# Patient Record
Sex: FEMALE | Race: WHITE | Employment: UNEMPLOYED | URBAN - METROPOLITAN AREA
[De-identification: names, ages, dates, MRNs, and addresses within clinical notes are randomized per-mention and may not be internally consistent; named-entity substitution may affect disease eponyms.]

---

## 2017-01-20 DIAGNOSIS — G43.919 INTRACTABLE MIGRAINE WITHOUT STATUS MIGRAINOSUS, UNSPECIFIED MIGRAINE TYPE: ICD-10-CM

## 2017-01-20 RX ORDER — TOPIRAMATE 25 MG/1
25 TABLET ORAL 2 TIMES DAILY WITH MEALS
Qty: 180 TAB | Refills: 1 | Status: SHIPPED | OUTPATIENT
Start: 2017-01-20 | End: 2018-11-28 | Stop reason: ALTCHOICE

## 2017-02-06 ENCOUNTER — OFFICE VISIT (OUTPATIENT)
Dept: INTERNAL MEDICINE CLINIC | Age: 32
End: 2017-02-06

## 2017-02-06 VITALS
HEART RATE: 103 BPM | SYSTOLIC BLOOD PRESSURE: 118 MMHG | WEIGHT: 251.3 LBS | RESPIRATION RATE: 16 BRPM | HEIGHT: 65 IN | BODY MASS INDEX: 41.87 KG/M2 | DIASTOLIC BLOOD PRESSURE: 78 MMHG | TEMPERATURE: 98.7 F | OXYGEN SATURATION: 98 %

## 2017-02-06 DIAGNOSIS — I47.9 TACHYCARDIA, PAROXYSMAL (HCC): ICD-10-CM

## 2017-02-06 DIAGNOSIS — G44.229 CHRONIC TENSION-TYPE HEADACHE, NOT INTRACTABLE: Primary | ICD-10-CM

## 2017-02-06 DIAGNOSIS — M65.9 TENOSYNOVITIS OF LEFT ANKLE: ICD-10-CM

## 2017-02-06 DIAGNOSIS — E66.01 MORBID OBESITY DUE TO EXCESS CALORIES (HCC): ICD-10-CM

## 2017-02-06 NOTE — MR AVS SNAPSHOT
Visit Information Date & Time Provider Department Dept. Phone Encounter #  
 2/6/2017  9:30 AM Vada Bumpers, [address] [facility],[address] Internal Medicine [phone] [id number] Upcoming Health Maintenance Date Due Pneumococcal 19-64 Medium Risk (1 of 1 - PPSV23) 11/1/2004 DTaP/Tdap/Td series (1 - Tdap) 11/1/2006 PAP AKA CERVICAL CYTOLOGY 11/1/2006 INFLUENZA AGE 9 TO ADULT 8/1/2016 Allergies as of 2/6/2017  Review Complete On: 2/6/2017 By: Richie Hogan LPN Severity Noted Reaction Type Reactions Tomato Medium 07/17/2013   Systemic Itching Amoxicillin  12/10/2012    Hives Grass Pollen-bermuda, Standard  03/11/2011    Swelling Malt Extract  09/10/2012    Unknown (comments) Oak  09/10/2012    Unknown (comments) Pcn [Penicillins]  02/21/2011    Unknown (comments) Ragweed  09/10/2012    Unknown (comments) Shellfish Containing Products  09/10/2012    Unknown (comments) Yeast, Dried  09/10/2012    Unknown (comments) Current Immunizations  Never Reviewed No immunizations on file. Not reviewed this visit You Were Diagnosed With   
  
 Codes Comments Chronic tension-type headache, not intractable    -  Primary ICD-10-CM: R34.943 ICD-9-CM: 339.12 Morbid obesity due to excess calories (HCC)     ICD-10-CM: E66.01 
ICD-9-CM: 278.01 Tenosynovitis of left ankle     ICD-10-CM: M65.9 ICD-9-CM: 727.06 Tachycardia, paroxysmal (HCC)     ICD-10-CM: I47.9 ICD-9-CM: 427.2 Vitals BP Pulse Temp Resp Height(growth percentile) Weight(growth percentile) 118/78 (BP 1 Location: Left arm, BP Patient Position: Sitting) (!) 103 98.7 °F (37.1 °C) (Oral) 16 5' 5\" (1.651 m) 251 lb 4.8 oz (114 kg) LMP SpO2 BMI OB Status Smoking Status 02/01/2017 98% 41.82 kg/m2 Having regular periods Former Smoker BMI and BSA Data Body Mass Index Body Surface Area  
 41.82 kg/m 2 2.29 m 2 Preferred Pharmacy Pharmacy Name Phone [id number] [place], [address] [phone] Your Updated Medication List  
  
   
This list is accurate as of: 2/6/17  9:55 AM.  Always use your most recent med list.  
  
  
  
  
 albuterol 90 mcg/actuation inhaler Commonly known as:  PROAIR HFA Take 1 Puff by inhalation every four (4) hours as needed for Wheezing. ALLEGRA 180 mg tablet Generic drug:  fexofenadine Take 180 mg by mouth daily. diclofenac EC 50 mg EC tablet Commonly known as:  VOLTAREN Take  by mouth two (2) times a day. FLONASE 50 mcg/actuation nasal spray Generic drug:  fluticasone 2 Sprays by Both Nostrils route daily. metFORMIN 500 mg tablet Commonly known as:  GLUCOPHAGE Take 500 mg by mouth two (2) times daily (with meals). Two pills in am and two pill in pm.  titrating to goal dose. topiramate 25 mg tablet Commonly known as:  TOPAMAX Take 1 Tab by mouth two (2) times daily (with meals). traMADol 50 mg tablet Commonly known as:  ULTRAM  
Take 50 mg by mouth every six (6) hours as needed for Pain. Introducing [facility] & HEALTH SERVICES! Dear Mckay Diehl: 
Thank you for requesting a [employer] account. Our records indicate that you already have an active [employer] account. You can access your account anytime at [email or web address] Did you know that you can access your hospital and ER discharge instructions at any time in [employer]? You can also review all of your test results from your hospital stay or ER visit. Additional Information If you have questions, please visit the Frequently Asked Questions section of the [employer] website at [email or web address] Remember, [employer] is NOT to be used for urgent needs. For medical emergencies, dial 911. Now available from your iPhone and Android! Please provide this summary of care documentation to your next provider. Your primary care clinician is listed as Margarita Gama. If you have any questions after today's visit, please call [phone].

## 2017-02-06 NOTE — PROGRESS NOTES
Written by Celi Beltrán, as dictated by Dr. Esther Carrizales MD.    Brayan Pop is a 32 y.o. female. HPI  The patient comes in today for a follow up. She weighs 251 lbs today and she has lost 9 lbs since the end of December. She had a biometric screening at [facility]. She is taking 1/2 tablet of phentermine at this time, and she is following with Dr. Luke Almeida for that. She is taking a full pill the week before her menstrual cycle. She follows with him next month. Her L ankle is slowly improving, and she is doing PT at this time. She had dry needling at [facility]. She should get her boot off in 2 weeks, but she was told that it will take 4-6 months to heal completely. She was cleared to swim at this time. She was given Tramadol for the pain as needed. She is compliant on Topamax, and it has significantly decreased her headaches. She still has headaches sometimes. She will get bad headaches with weather changes. She is compliant with B12 and vitamin D. Her HR is elevated today, but she did not sleep well. Patient Active Problem List   Diagnosis Code    Obesity E66.9    Anxiety disorder F41.9    Allergic asthma J45.909    Chronic tension-type headache, not intractable G44.229        Current Outpatient Prescriptions on File Prior to Visit   Medication Sig Dispense Refill    topiramate (TOPAMAX) 25 mg tablet Take 1 Tab by mouth two (2) times daily (with meals). 180 Tab 1    metFORMIN (GLUCOPHAGE) 500 mg tablet Take 500 mg by mouth two (2) times daily (with meals). Two pills in am and two pill in pm.  titrating to goal dose.  fluticasone (FLONASE) 50 mcg/actuation nasal spray 2 Sprays by Both Nostrils route daily.  fexofenadine (ALLEGRA) 180 mg tablet Take 180 mg by mouth daily.  ibuprofen (MOTRIN) 600 mg tablet Take 1 Tab by mouth every six (6) hours as needed for Pain.  20 Tab 0    albuterol (PROAIR HFA) 90 mcg/actuation inhaler Take 1 Puff by inhalation every four (4) hours as needed for Wheezing. 1 Inhaler 3    traMADol (ULTRAM) 50 mg tablet Take 50 mg by mouth every six (6) hours as needed for Pain.  diclofenac EC (VOLTAREN) 50 mg EC tablet Take  by mouth two (2) times a day. No current facility-administered medications on file prior to visit. Allergies   Allergen Reactions    Tomato Itching    Amoxicillin Hives    Grass Pollen-Bermuda, Standard Swelling    Malt Extract Unknown (comments)    Oak Unknown (comments)    Pcn [Penicillins] Unknown (comments)    Ragweed Unknown (comments)    Shellfish Containing Products Unknown (comments)    Yeast, Dried Unknown (comments)       Past Medical History   Diagnosis Date    Asthma     Depression     Family history of skin cancer      maternal grandfather - melanoma    Headache(784.0)     PCOS (polycystic ovarian syndrome)     Sun-damaged skin     Tanning bed exposure        Past Surgical History   Procedure Laterality Date    Hx wisdom teeth extraction      Hx tonsil and adenoidectomy      Hx tonsillectomy         Family History   Problem Relation Age of Onset    Psychiatric Disorder Father     Cancer Maternal Aunt     Heart Disease Maternal Grandfather     Hypertension Maternal Grandfather     Cancer Paternal Grandmother     Diabetes Paternal Grandmother     Hypertension Paternal Grandmother        Social History     Social History    Marital status: SINGLE     Spouse name: N/A    Number of children: N/A    Years of education: N/A     Occupational History    Not on file.      Social History Main Topics    Smoking status: Former Smoker    Smokeless tobacco: Never Used    Alcohol use 1.0 oz/week     2 Glasses of wine per week    Drug use: No    Sexual activity: Yes     Partners: Male     Other Topics Concern    Not on file     Social History Narrative    ** Merged History Encounter **            Office Visit on 11/21/2016 Component Date Value Ref Range Status    Vitamin B12 11/21/2016 374  211 - 946 pg/mL Final    Folate 11/21/2016 8.7  >3.0 ng/mL Final    Comment: A serum folate concentration of less than 3.1 ng/mL is  considered to represent clinical deficiency. Review of Systems   Constitutional: Negative for malaise/fatigue. HENT: Negative for congestion. Respiratory: Negative for cough and wheezing. Cardiovascular: Negative for chest pain and palpitations. Musculoskeletal: Negative for joint pain and myalgias. Neurological: Positive for headaches. Negative for weakness. Psychiatric/Behavioral: Negative for depression and suicidal ideas. The patient is not nervous/anxious. Visit Vitals    /78 (BP 1 Location: Left arm, BP Patient Position: Sitting)    Pulse (!) 103    Temp 98.7 °F (37.1 °C) (Oral)    Resp 16    Ht 5' 5\" (1.651 m)    Wt 251 lb 4.8 oz (114 kg)  Comment: received from [facility] [employer].  [facility] 02/01/2017    SpO2 98%    BMI 41.82 kg/m2     Physical Exam   Constitutional: She is oriented to person, place, and time. No distress. Obese   HENT:   Right Ear: External ear normal.   Left Ear: External ear normal.   Mouth/Throat: Oropharynx is clear and moist.   Eyes: Conjunctivae and EOM are normal. Right eye exhibits no discharge. Left eye exhibits no discharge. Neck: Normal range of motion. Neck supple. Cardiovascular: Regular rhythm. Tachycardia present. Pulmonary/Chest: Effort normal and breath sounds normal. She has no wheezes. Abdominal: Soft. Bowel sounds are normal. She exhibits no distension. Lymphadenopathy:     She has no cervical adenopathy. Neurological: She is alert and oriented to person, place, and time. Skin: Skin is intact. Psychiatric: She has a normal mood and affect. Nursing note and vitals reviewed. ASSESSMENT and PLAN    ICD-10-CM ICD-9-CM    1.  Chronic tension-type headache, not intractable G44.229 339.12 Doing well on current dose. 2. Morbid obesity due to excess calories (HCC) E66.01 278.01 She is following with Dr. Joanne Allred at [facility] and she has lost 28 lbs total.    3. Tenosynovitis of left ankle M65.9 727.06 She follows with ortho. 4. Tachycardia, paroxysmal (HCC) I47.9 427.2 I discussed that she should be careful with her HR since phentermine can cause palpitations. I discussed that she could have a hypoglycemic episode with the metformin now that she has lost weight, so if she feels dizzy or light headed she should check her BS. This plan was reviewed with the patient and patient agrees. All questions were answered. This scribe documentation was reviewed by me and accurately reflects the examination and decisions made by me. This note will not be viewable in [address]

## 2017-02-06 NOTE — PROGRESS NOTES
Chief Complaint   Patient presents with    Follow-up     follow up on weight loss and general wellness. headaches.

## 2017-02-21 ENCOUNTER — OFFICE VISIT (OUTPATIENT)
Dept: FAMILY MEDICINE CLINIC | Age: 32
End: 2017-02-21

## 2017-02-21 VITALS
HEART RATE: 119 BPM | TEMPERATURE: 98.1 F | RESPIRATION RATE: 18 BRPM | DIASTOLIC BLOOD PRESSURE: 94 MMHG | WEIGHT: 251 LBS | SYSTOLIC BLOOD PRESSURE: 124 MMHG | HEIGHT: 65 IN | BODY MASS INDEX: 41.82 KG/M2 | OXYGEN SATURATION: 98 %

## 2017-02-21 DIAGNOSIS — J30.1 NON-SEASONAL ALLERGIC RHINITIS DUE TO POLLEN: Primary | ICD-10-CM

## 2017-02-21 DIAGNOSIS — J02.9 SORE THROAT: ICD-10-CM

## 2017-02-21 LAB
S PYO AG THROAT QL: NEGATIVE
VALID INTERNAL CONTROL?: YES

## 2017-02-21 RX ORDER — PHENTERMINE HYDROCHLORIDE 15 MG/1
15 CAPSULE ORAL
COMMUNITY
End: 2018-09-10 | Stop reason: ALTCHOICE

## 2017-02-21 NOTE — MR AVS SNAPSHOT
Visit Information Date & Time Provider Department Dept. Phone Encounter #  
 2/21/2017  5:30 PM Polina Burdick, [address] [phone] [id number] Follow-up Instructions Return if symptoms worsen or fail to improve. Upcoming Health Maintenance Date Due Pneumococcal 19-64 Medium Risk (1 of 1 - PPSV23) 11/1/2004 DTaP/Tdap/Td series (1 - Tdap) 11/1/2006 PAP AKA CERVICAL CYTOLOGY 11/1/2006 Allergies as of 2/21/2017  Review Complete On: 2/21/2017 By: Domenica Maher LPN Severity Noted Reaction Type Reactions Tomato Medium 07/17/2013   Systemic Itching Amoxicillin  12/10/2012    Hives Grass Pollen-bermuda, Standard  03/11/2011    Swelling Malt Extract  09/10/2012    Unknown (comments) Oak  09/10/2012    Unknown (comments) Pcn [Penicillins]  02/21/2011    Unknown (comments) Ragweed  09/10/2012    Unknown (comments) Shellfish Containing Products  09/10/2012    Unknown (comments) Yeast, Dried  09/10/2012    Unknown (comments) Current Immunizations  Never Reviewed No immunizations on file. Not reviewed this visit You Were Diagnosed With   
  
 Codes Comments Non-seasonal allergic rhinitis due to pollen    -  Primary ICD-10-CM: J30.1 ICD-9-CM: 477.0 Sore throat     ICD-10-CM: J02.9 ICD-9-CM: 509 Vitals BP Pulse Temp Resp Height(growth percentile) Weight(growth percentile) (!) 124/94 (!) 119 98.1 °F (36.7 °C) (Oral) 18 5' 5\" (1.651 m) 251 lb (113.9 kg) LMP SpO2 BMI OB Status Smoking Status 02/01/2017 98% 41.77 kg/m2 Having regular periods Former Smoker Vitals History BMI and BSA Data Body Mass Index Body Surface Area 41.77 kg/m 2 2.29 m 2 Preferred Pharmacy Pharmacy Name Phone [id number] [place] [address] [phone] Your Updated Medication List  
  
   
 This list is accurate as of: 2/21/17  6:28 PM.  Always use your most recent med list.  
  
  
  
  
 albuterol 90 mcg/actuation inhaler Commonly known as:  PROAIR HFA Take 1 Puff by inhalation every four (4) hours as needed for Wheezing. ALLEGRA 180 mg tablet Generic drug:  fexofenadine Take 180 mg by mouth daily. FLONASE 50 mcg/actuation nasal spray Generic drug:  fluticasone 2 Sprays by Both Nostrils route daily. metFORMIN 500 mg tablet Commonly known as:  GLUCOPHAGE Take 500 mg by mouth two (2) times daily (with meals). Two pills in am and two pill in pm.  titrating to goal dose. phentermine 15 mg capsule Commonly known as:  ADIPEX_P Take 15 mg by mouth every morning. topiramate 25 mg tablet Commonly known as:  TOPAMAX Take 1 Tab by mouth two (2) times daily (with meals). We Performed the Following AMB POC RAPID STREP A [52903 CPT(R)] Follow-up Instructions Return if symptoms worsen or fail to improve. Patient Instructions Seasonal Allergies: Care Instructions Your Care Instructions Allergies occur when your body's defense system (immune system) overreacts to certain substances. The immune system treats a harmless substance as if it were a harmful germ or virus. Many things can cause this to happen. Examples include pollens, medicine, food, dust, animal dander, and mold. Your allergies are seasonal if you have symptoms just at certain times of the year. In that case, you are probably allergic to pollens from certain trees, grasses, or weeds. Allergies can be mild or severe. Over-the-counter allergy medicine may help with some symptoms. Read and follow all instructions on the label. Managing your allergies is an important part of staying healthy. Your doctor may suggest that you have tests to help find the cause of your allergies.  When you know what things trigger your symptoms, you can avoid them. This can prevent allergy symptoms and other health problems. In some cases, immunotherapy might help. For this treatment, you get shots or use pills that have a small amount of certain allergens in them. Your body \"gets used to\" the allergen, so you react less to it over time. This kind of treatment may help prevent or reduce some allergy symptoms. Follow-up care is a key part of your treatment and safety. Be sure to make and go to all appointments, and call your doctor if you are having problems. It's also a good idea to know your test results and keep a list of the medicines you take. How can you care for yourself at home? · Be safe with medicines. Take your medicines exactly as prescribed. Call your doctor if you think you are having a problem with your medicine. · During your allergy season, keep windows closed. If you need to use air-conditioning, change or clean all filters every month. Take a shower and change your clothes after you have been outside. · Stay inside when pollen counts are high. Vacuum once or twice a week. Use a vacuum  with a HEPA filter or a double-thickness filter. When should you call for help? Call 911 anytime you think you may need emergency care. For example, call if: 
· You have symptoms of a severe allergic reaction. These may include: 
¨ Sudden raised, red areas (hives) all over your body. ¨ Swelling of the throat, mouth, lips, or tongue. ¨ Trouble breathing. ¨ Passing out (losing consciousness). Or you may feel very lightheaded or suddenly feel weak, confused, or restless. Watch closely for changes in your health, and be sure to contact your doctor if: 
· You need help controlling your allergies. · You have questions about allergy testing. · You do not get better as expected. Where can you learn more? Go to http://marcelle-yvonne.info/. Enter J912 in the search box to learn more about \"Seasonal Allergies: Care Instructions. \" 
 Current as of: February 12, 2016 Content Version: 11.1 © 3048-5318 [employer]. Care instructions adapted under license by [employer] (which disclaims liability or warranty for this information). If you have questions about a medical condition or this instruction, always ask your healthcare professional. [address] any warranty or liability for your use of this information. Introducing \[facility]\"" & HEALTH SERVICES! Dear Jose A Gallardo: 
Thank you for requesting a [email or web address] account. Our records indicate that you already have an active [email or web address] account. You can access your account anytime at [email or web address] Did you know that you can access your hospital and ER discharge instructions at any time in [email or web address]? You can also review all of your test results from your hospital stay or ER visit. Additional Information If you have questions, please visit the Frequently Asked Questions section of the [email or web address] website at [email or web address] Remember, [email or web address] is NOT to be used for urgent needs. For medical emergencies, dial 911. Now available from your iPhone and Android! Please provide this summary of care documentation to your next provider. Your primary care clinician is listed as Miryam De Santiago. If you have any questions after today's visit, please call [phone].

## 2017-02-21 NOTE — PROGRESS NOTES
Subjective:      Patient : This patient is a 32 y.o. female that comes in with a chief complaint of allerigic rhinitis. The patient has responded to antihistamines and has responded to intranasal coriticosteroids. Other remedies tried:tried OTCs with relief of symptoms. She did get exposed to Strep, and has a mild sore throat for 2 days. No fever or congestion noted. Objective:     ROS:   Feeling well. No dyspnea or chest pain on exertion. No abdominal pain, change in bowel habits, black or bloody stools. No urinary tract symptoms. No neurological complaints. Mild sore throat x 2 days. OBJECTIVE:   The patient appears well, alert, oriented x 3, in no distress. Visit Vitals    BP (!) 124/94  Comment: wgt loss meds make bp high    Pulse (!) 119  Comment: weight loss meds make pulse high    Temp 98.1 °F (36.7 °C) (Oral)    Resp 18    Ht 5' 5\" (1.651 m)    Wt 251 lb (113.9 kg)    LMP 02/01/2017    SpO2 98%    BMI 41.77 kg/m2     HEENT:ENT clear, no erythema or exudate. Neck supple. No adenopathy or thyromegaly. AARTI. Chest: Lungs are clear, good air entry, no wheezes, rhonchi or rales. Cardiovascular: S1 and S2 normal, no murmurs, regular rate and rhythm. Abdomen: soft without tenderness, guarding, mass or organomegaly. Extremities: show no edema, normal peripheral pulses. Neurological: is normal, no focal findings. Assessment/Plan:   Allergy management discussed. ICD-10-CM ICD-9-CM    1. Non-seasonal allergic rhinitis due to pollen J30.1 477.0    2.  Sore throat J02.9 462 AMB POC RAPID STREP A

## 2017-02-21 NOTE — PATIENT INSTRUCTIONS
Seasonal Allergies: Care Instructions  Your Care Instructions  Allergies occur when your body's defense system (immune system) overreacts to certain substances. The immune system treats a harmless substance as if it were a harmful germ or virus. Many things can cause this to happen. Examples include pollens, medicine, food, dust, animal dander, and mold. Your allergies are seasonal if you have symptoms just at certain times of the year. In that case, you are probably allergic to pollens from certain trees, grasses, or weeds. Allergies can be mild or severe. Over-the-counter allergy medicine may help with some symptoms. Read and follow all instructions on the label. Managing your allergies is an important part of staying healthy. Your doctor may suggest that you have tests to help find the cause of your allergies. When you know what things trigger your symptoms, you can avoid them. This can prevent allergy symptoms and other health problems. In some cases, immunotherapy might help. For this treatment, you get shots or use pills that have a small amount of certain allergens in them. Your body \"gets used to\" the allergen, so you react less to it over time. This kind of treatment may help prevent or reduce some allergy symptoms. Follow-up care is a key part of your treatment and safety. Be sure to make and go to all appointments, and call your doctor if you are having problems. It's also a good idea to know your test results and keep a list of the medicines you take. How can you care for yourself at home? · Be safe with medicines. Take your medicines exactly as prescribed. Call your doctor if you think you are having a problem with your medicine. · During your allergy season, keep windows closed. If you need to use air-conditioning, change or clean all filters every month. Take a shower and change your clothes after you have been outside. · Stay inside when pollen counts are high.  Vacuum once or twice a week. Use a vacuum  with a HEPA filter or a double-thickness filter. When should you call for help? Call 911 anytime you think you may need emergency care. For example, call if:  · You have symptoms of a severe allergic reaction. These may include:  ¨ Sudden raised, red areas (hives) all over your body. ¨ Swelling of the throat, mouth, lips, or tongue. ¨ Trouble breathing. ¨ Passing out (losing consciousness). Or you may feel very lightheaded or suddenly feel weak, confused, or restless. Watch closely for changes in your health, and be sure to contact your doctor if:  · You need help controlling your allergies. · You have questions about allergy testing. · You do not get better as expected. Where can you learn more? Go to http://marcelle-yvonne.info/. Enter J912 in the search box to learn more about \"Seasonal Allergies: Care Instructions. \"  Current as of: February 12, 2016  Content Version: 11.1  © 20063829-2109 [employer]. Care instructions adapted under license by [employer] (which disclaims liability or warranty for this information). If you have questions about a medical condition or this instruction, always ask your healthcare professional. [address] any warranty or liability for your use of this information.

## 2017-03-28 ENCOUNTER — OFFICE VISIT (OUTPATIENT)
Dept: INTERNAL MEDICINE CLINIC | Age: 32
End: 2017-03-28

## 2017-03-28 VITALS
DIASTOLIC BLOOD PRESSURE: 88 MMHG | OXYGEN SATURATION: 99 % | BODY MASS INDEX: 42.61 KG/M2 | SYSTOLIC BLOOD PRESSURE: 134 MMHG | HEART RATE: 103 BPM | TEMPERATURE: 98.9 F | HEIGHT: 65 IN | RESPIRATION RATE: 16 BRPM | WEIGHT: 255.73 LBS

## 2017-03-28 DIAGNOSIS — E66.01 MORBID OBESITY, UNSPECIFIED OBESITY TYPE (HCC): ICD-10-CM

## 2017-03-28 DIAGNOSIS — F41.8 DEPRESSION WITH ANXIETY: Primary | ICD-10-CM

## 2017-03-28 RX ORDER — BUPROPION HYDROCHLORIDE 75 MG/1
75 TABLET ORAL 2 TIMES DAILY
Qty: 60 TAB | Refills: 0 | Status: SHIPPED | OUTPATIENT
Start: 2017-03-28 | End: 2017-04-27

## 2017-03-28 NOTE — PROGRESS NOTES
Chief Complaint   Patient presents with    Other     patient was in an air cast with leg and started to feel depressed. states it is getting worse.  now she knows that she has depression. has hx and has not seen mental health in over two years.   needs a referral.

## 2017-03-28 NOTE — PROGRESS NOTES
Written by Sridevi Chaudhry, as dictated by Dr. Carter Milligan MD.    Heaven Walden is a 32 y.o. female. HPI  The patient comes in today C/O depression. She has been feeling depressed and having crying spells since  January and she attributed it to the air cast she had on her foot because she could not exercise or go out. But now she does not have air cast & symptoms are getting worse. She is concerned she will gain all her weight back if she doesn`t get a treatment for her depression right a way. The depression is making it harder to loose weight. She has gained 4 lbs since she was last seen in January. She has had  tried Wellbutrin and Celexa in the past. She would like to follow with a psychiatrist.     She is still following with Dr. Louie Swenson for her weight loss again in 6 weeks. She is still on phentermine at this point, but he is discussing changing her to another medication. Patient Active Problem List   Diagnosis Code    Obesity E66.9    Anxiety disorder F41.9    Allergic asthma J45.909    Chronic tension-type headache, not intractable G44.229        Current Outpatient Prescriptions on File Prior to Visit   Medication Sig Dispense Refill    phentermine (ADIPEX_P) 15 mg capsule Take 15 mg by mouth every morning.  topiramate (TOPAMAX) 25 mg tablet Take 1 Tab by mouth two (2) times daily (with meals). 180 Tab 1    metFORMIN (GLUCOPHAGE) 500 mg tablet Take 500 mg by mouth two (2) times daily (with meals). Two pills in am and two pill in pm.  titrating to goal dose.  fluticasone (FLONASE) 50 mcg/actuation nasal spray 2 Sprays by Both Nostrils route daily.  fexofenadine (ALLEGRA) 180 mg tablet Take 180 mg by mouth daily.  albuterol (PROAIR HFA) 90 mcg/actuation inhaler Take 1 Puff by inhalation every four (4) hours as needed for Wheezing.  (Patient taking differently: Take 2 Puffs by inhalation every four (4) hours as needed for Wheezing.) 1 Inhaler 3     No current facility-administered medications on file prior to visit. Allergies   Allergen Reactions    Tomato Itching    Amoxicillin Hives    Grass Pollen-Bermuda, Standard Swelling    Malt Extract Unknown (comments)    Oak Unknown (comments)    Pcn [Penicillins] Unknown (comments)    Ragweed Unknown (comments)    Shellfish Containing Products Unknown (comments)    Yeast, Dried Unknown (comments)       Past Medical History:   Diagnosis Date    Asthma     Depression     Family history of skin cancer     maternal grandfather - melanoma    Headache(784.0)     PCOS (polycystic ovarian syndrome)     Sun-damaged skin     Tanning bed exposure        Past Surgical History:   Procedure Laterality Date    HX TONSIL AND ADENOIDECTOMY      HX TONSILLECTOMY      HX WISDOM TEETH EXTRACTION         Family History   Problem Relation Age of Onset    Psychiatric Disorder Father     Cancer Maternal Aunt     Heart Disease Maternal Grandfather     Hypertension Maternal Grandfather     Cancer Paternal Grandmother     Diabetes Paternal Grandmother     Hypertension Paternal Grandmother        Social History     Social History    Marital status: SINGLE     Spouse name: N/A    Number of children: N/A    Years of education: N/A     Occupational History    Not on file. Social History Main Topics    Smoking status: Former Smoker    Smokeless tobacco: Never Used    Alcohol use 1.0 oz/week     2 Glasses of wine per week    Drug use: No    Sexual activity: Yes     Partners: Male     Other Topics Concern    Not on file     Social History Narrative    ** Merged History Encounter **            Office Visit on 02/21/2017   Component Date Value Ref Range Status    VALID INTERNAL CONTROL POC 02/21/2017 Yes   Final    Group A Strep Ag 02/21/2017 Negative  Negative Final       Review of Systems   Constitutional: Negative for malaise/fatigue. HENT: Negative for congestion.     Respiratory: Negative for cough and wheezing. Cardiovascular: Negative for chest pain and palpitations. Neurological: Negative for weakness and headaches. Psychiatric/Behavioral: Positive for depression. Negative for suicidal ideas. The patient is not nervous/anxious. Visit Vitals    /88 (BP 1 Location: Right arm, BP Patient Position: Sitting)    Pulse (!) 103    Temp 98.9 °F (37.2 °C) (Oral)    Resp 16    Ht 5' 5\" (1.651 m)    Wt 255 lb 11.7 oz (116 kg)    LMP 02/26/2017    SpO2 99%    BMI 42.56 kg/m2     Physical Exam   Constitutional: She is oriented to person, place, and time. She appears well-nourished. No distress. HENT:   Right Ear: External ear normal.   Left Ear: External ear normal.   Mouth/Throat: Oropharynx is clear and moist.   Eyes: Conjunctivae and EOM are normal. Right eye exhibits no discharge. Left eye exhibits no discharge. Neck: Normal range of motion. Neck supple. Cardiovascular: Normal rate and regular rhythm. Pulmonary/Chest: Effort normal and breath sounds normal. She has no wheezes. Abdominal: Soft. Bowel sounds are normal. She exhibits no distension. Lymphadenopathy:     She has no cervical adenopathy. Neurological: She is alert and oriented to person, place, and time. Skin: Skin is intact. Psychiatric: She has a normal mood and affect. Nursing note and vitals reviewed. ASSESSMENT and PLAN    ICD-10-CM ICD-9-CM    1. Depression with anxiety F41.8 300.4 buPROPion (WELLBUTRIN) 75 mg tablet sent to pharmacy. REFERRAL TO PSYCHIATRY      I will start her on Wellbutrin to help with her depression. I want her to start Wellbutrin 75 once a day for 4-5 days and then she can increase to BID. I will place a referral for psychiatry and I discussed that it may take her a while to get an appointment. I will give her the number for a [place] [id number] [facility] therapy to try in the meanwhile.     2. Morbid obesity, unspecified obesity type ([facility] E66.01 278.01 She follows with Dr. Robin Her for weight loss and she is currently on phentermine. This plan was reviewed with the patient and patient agrees. All questions were answered. This scribe documentation was reviewed by me and accurately reflects the examination and decisions made by me. This note will not be viewable in [address]

## 2017-03-28 NOTE — MR AVS SNAPSHOT
Visit Information Date & Time Provider Department Dept. Phone Encounter #  
 3/28/2017  3:45 PM Karen Dawson MD [facility] Internal Medicine [phone] [id number] Upcoming Health Maintenance Date Due Pneumococcal 19-64 Medium Risk (1 of 1 - PPSV23) 11/1/2004 DTaP/Tdap/Td series (1 - Tdap) 11/1/2006 PAP AKA CERVICAL CYTOLOGY 11/1/2006 Allergies as of 3/28/2017  Review Complete On: 3/28/2017 By: Silvia Lemon LPN Severity Noted Reaction Type Reactions Tomato Medium 07/17/2013   Systemic Itching Amoxicillin  12/10/2012    Hives Grass Pollen-bermuda, Standard  03/11/2011    Swelling Malt Extract  09/10/2012    Unknown (comments) Oak  09/10/2012    Unknown (comments) Pcn [Penicillins]  02/21/2011    Unknown (comments) Ragweed  09/10/2012    Unknown (comments) Shellfish Containing Products  09/10/2012    Unknown (comments) Yeast, Dried  09/10/2012    Unknown (comments) Current Immunizations  Never Reviewed No immunizations on file. Not reviewed this visit You Were Diagnosed With   
  
 Codes Comments Depression with anxiety    -  Primary ICD-10-CM: F41.8 ICD-9-CM: 300.4 Morbid obesity, unspecified obesity type ([facility]     ICD-10-CM: E66.01 
ICD-9-CM: 278.01 Vitals BP Pulse Temp Resp Height(growth percentile) Weight(growth percentile) 134/88 (BP 1 Location: Right arm, BP Patient Position: Sitting) (!) 103 98.9 °F (37.2 °C) (Oral) 16 5' 5\" (1.651 m) 255 lb 11.7 oz (116 kg) LMP SpO2 BMI OB Status Smoking Status 02/26/2017 99% 42.56 kg/m2 Having regular periods Former Smoker BMI and BSA Data Body Mass Index Body Surface Area 42.56 kg/m 2 2.31 m 2 Preferred Pharmacy Pharmacy Name Phone [id number] [address] [facility] [phone] Your Updated Medication List  
  
   
This list is accurate as of: 3/28/17  4:01 PM.  Always use your most recent med list.  
  
  
  
  
 albuterol 90 mcg/actuation inhaler Commonly known as:  PROAIR HFA Take 1 Puff by inhalation every four (4) hours as needed for Wheezing. ALLEGRA 180 mg tablet Generic drug:  fexofenadine Take 180 mg by mouth daily. buPROPion 75 mg tablet Commonly known as:  [facility] Take 1 Tab by mouth two (2) times a day for 30 days. FLONASE 50 mcg/actuation nasal spray Generic drug:  fluticasone 2 Sprays by Both Nostrils route daily. metFORMIN 500 mg tablet Commonly known as:  GLUCOPHAGE Take 500 mg by mouth two (2) times daily (with meals). Two pills in am and two pill in pm.  titrating to goal dose. phentermine 15 mg capsule Commonly known as:  ADIPEX_P Take 15 mg by mouth every morning. topiramate 25 mg tablet Commonly known as:  TOPAMAX Take 1 Tab by mouth two (2) times daily (with meals). Prescriptions Sent to Pharmacy Refills buPROPion (WELLBUTRIN) 75 mg tablet 0 Sig: Take 1 Tab by mouth two (2) times a day for 30 days. Class: Normal  
 Pharmacy: [address] [place] [address].  #: [phone] Route: Oral  
  
We Performed the Following REFERRAL TO PSYCHIATRY [REF91 Custom] Comments:  
 Please evaluate patient for depression with anxiety. Referral Information Referral ID Referred By Referred To  
  
 [id number] Lyndsey VIEYRA 2   
   [facility] [address], [address] Phone: [phone] Fax: [phone] Visits Status Start Date End Date 1 New Request 3/28/17 3/28/18 If your referral has a status of pending review or denied, additional information will be sent to support the outcome of this decision. Introducing [facility]! Dear Richi Hamlin: 
Thank you for requesting a [employer] account. Our records indicate that you already have an active [employer] account.   You can access your account anytime at [email or web address] Did you know that you can access your hospital and ER discharge instructions at any time in tadoÂ°? You can also review all of your test results from your hospital stay or ER visit. Additional Information If you have questions, please visit the Frequently Asked Questions section of the tadoÂ° website at [email or web address] Remember, tadoÂ° is NOT to be used for urgent needs. For medical emergencies, dial 911. Now available from your iPhone and Android! Please provide this summary of care documentation to your next provider. Your primary care clinician is listed as Brandy Reddy. If you have any questions after today's visit, please call [phone].

## 2017-09-21 ENCOUNTER — OFFICE VISIT (OUTPATIENT)
Dept: FAMILY MEDICINE CLINIC | Age: 32
End: 2017-09-21

## 2017-09-21 VITALS
DIASTOLIC BLOOD PRESSURE: 88 MMHG | RESPIRATION RATE: 16 BRPM | WEIGHT: 253.6 LBS | SYSTOLIC BLOOD PRESSURE: 118 MMHG | OXYGEN SATURATION: 96 % | TEMPERATURE: 96.8 F | BODY MASS INDEX: 42.25 KG/M2 | HEIGHT: 65 IN | HEART RATE: 94 BPM

## 2017-09-21 DIAGNOSIS — H65.01 RIGHT ACUTE SEROUS OTITIS MEDIA, RECURRENCE NOT SPECIFIED: Primary | ICD-10-CM

## 2017-09-21 DIAGNOSIS — J30.89 ENVIRONMENTAL AND SEASONAL ALLERGIES: ICD-10-CM

## 2017-09-21 DIAGNOSIS — H69.83 EUSTACHIAN TUBE DYSFUNCTION, BILATERAL: ICD-10-CM

## 2017-09-21 RX ORDER — LIRAGLUTIDE 6 MG/ML
INJECTION, SOLUTION SUBCUTANEOUS
Refills: 5 | COMMUNITY
Start: 2017-08-30 | End: 2018-08-28 | Stop reason: ALTCHOICE

## 2017-09-21 RX ORDER — BUPROPION HYDROCHLORIDE 300 MG/1
TABLET ORAL
Refills: 2 | COMMUNITY
Start: 2017-08-26 | End: 2020-12-01 | Stop reason: DRUGHIGH

## 2017-09-21 RX ORDER — BUPROPION HYDROCHLORIDE 150 MG/1
TABLET ORAL
Refills: 2 | COMMUNITY
Start: 2017-07-27 | End: 2018-08-28 | Stop reason: ALTCHOICE

## 2017-09-21 RX ORDER — DOXYCYCLINE 100 MG/1
100 CAPSULE ORAL 2 TIMES DAILY
Qty: 20 CAP | Refills: 0 | Status: SHIPPED | OUTPATIENT
Start: 2017-09-21 | End: 2017-10-01

## 2017-09-21 NOTE — PROGRESS NOTES
Subjective:   Jean Carlos Martin is a 32 y.o. female who complains of right ear pain for few days, gradually worsening since that time. Symptoms first started over a week ago with right ear and muffled hearing. The dull ache to the right ear started 3-4 days ago and has persisted. She reports R ear feeling clogged and occasionally can pop it. Now the left ear is starting to have similar symptoms. She also reports nasal congestion, sore throat, and postnasal drainage which she attributes to allergies. Denies any cough. Denies any fevers but has been feeling hot. She reports hx of seasonal allergies, currently taking Allegra, Sudafed, and Flonase for her symptoms. She denies a history of shortness of breath and wheezing. Evaluation to date: none. Treatment to date: decongestants, antihistamines, nasal steroids. Patient does not smoke cigarettes. Relevant PMH:   Past Medical History:   Diagnosis Date    Asthma     Depression     Family history of skin cancer     maternal grandfather - melanoma    Headache     PCOS (polycystic ovarian syndrome)     Sun-damaged skin     Tanning bed exposure      Past Surgical History:   Procedure Laterality Date    HX TONSIL AND ADENOIDECTOMY      HX TONSILLECTOMY      HX WISDOM TEETH EXTRACTION       Allergies   Allergen Reactions    Tomato Itching    Amoxicillin Hives    Grass Pollen-Bermuda, Standard Swelling    Iodine Hives    Malt Extract Unknown (comments)    Oak Unknown (comments)    Pcn [Penicillins] Unknown (comments)    Ragweed Unknown (comments)    Shellfish Containing Products Unknown (comments)    Yeast, Dried Unknown (comments)         Review of Systems  Pertinent items are noted in HPI.     Objective:     Visit Vitals    /88 (BP 1 Location: Left arm, BP Patient Position: Sitting)    Pulse 94    Temp 96.8 °F (36 °C) (Oral)    Resp 16    Ht 5' 5\" (1.651 m)    Wt 253 lb 9.6 oz (115 kg)    SpO2 96%    BMI 42.2 kg/m2 General:  alert, cooperative, no distress   Eyes: negative   Ears: abnormal TM AS - serous middle ear fluid, mild, abnormal TM AD - erythematous, dull, bulging, purulent middle ear fluid   Sinuses: Normal paranasal sinuses without tenderness   Mouth:  Lips, mucosa, and tongue normal. Teeth and gums normal and normal findings: oropharynx pink & moist without lesions or evidence of thrush   Neck: supple, symmetrical, trachea midline and no adenopathy. Heart: S1 and S2 normal, no murmurs noted. Lungs: clear to auscultation bilaterally        Assessment/Plan:       ICD-10-CM ICD-9-CM    1. Right acute serous otitis media, recurrence not specified H65.01 381.01    2. Eustachian tube dysfunction, bilateral H69.83 381.81    3. Environmental and seasonal allergies J30.89 477.8      Recommend she continue Allegra, Flonase, and Sudafed. Pt has considered allergy shots in the past but opted not to try. Suggested symptomatic OTC remedies. Antibiotics per orders. RTC prn. Sal Orozco NP  This note will not be viewable in [address]

## 2017-09-21 NOTE — PROGRESS NOTES
Pt states that she had an (r) ear ache for about 1 week. Pt states that (L) ear is now starting to feel bad. Pt states that she has tried taking sudafed for congestion and used ear drops.

## 2017-09-21 NOTE — PATIENT INSTRUCTIONS
Middle Ear Fluid: Care Instructions  Your Care Instructions    Fluid often builds up inside the ear during a cold or allergies. Usually the fluid drains away, but sometimes a small tube in the ear, called the eustachian tube, stays blocked for months. Symptoms of fluid buildup may include:  · Popping, ringing, or a feeling of fullness or pressure in the ear. · Trouble hearing. · Balance problems and dizziness. In most cases, you can treat yourself at home. Follow-up care is a key part of your treatment and safety. Be sure to make and go to all appointments, and call your doctor if you are having problems. It's also a good idea to know your test results and keep a list of the medicines you take. How can you care for yourself at home? · In most cases, the fluid clears up within a few months without treatment. You may need more tests if the fluid does not clear up after 3 months. · If your doctor prescribed antibiotics, take them as directed. Do not stop taking them just because you feel better. You need to take the full course of antibiotics. When should you call for help? Call your doctor now or seek immediate medical care if:  · You have symptoms of infection, such as:  ¨ Increased pain, swelling, warmth, or redness. ¨ Pus draining from the area. ¨ A fever. Watch closely for changes in your health, and be sure to contact your doctor if:  · You notice changes in hearing. · You do not get better as expected. Where can you learn more? Go to http://marcelle-yvonne.info/. Enter A044 in the search box to learn more about \"Middle Ear Fluid: Care Instructions. \"  Current as of: July 29, 2016  Content Version: 11.3  © 8071-9689 [employer]. Care instructions adapted under license by [employer] (which disclaims liability or warranty for this information).  If you have questions about a medical condition or this instruction, always ask your healthcare professional. [facility] disclaims any warranty or liability for your use of this information.

## 2017-09-21 NOTE — MR AVS SNAPSHOT
Visit Information Date & Time Provider Department Dept. Phone Encounter #  
 9/21/2017  8:00 AM Prakash Ortiz NP [address] [facility] [phone] [id number] Follow-up Instructions Return if symptoms worsen or fail to improve. Upcoming Health Maintenance Date Due Pneumococcal 19-64 Medium Risk (1 of 1 - PPSV23) 11/1/2004 DTaP/Tdap/Td series (1 - Tdap) 11/1/2006 PAP AKA CERVICAL CYTOLOGY 11/1/2006 INFLUENZA AGE 9 TO ADULT 8/1/2017 Allergies as of 9/21/2017  Review Complete On: 9/21/2017 By: Prakash Ortiz NP Severity Noted Reaction Type Reactions Tomato Medium 07/17/2013   Systemic Itching Amoxicillin  12/10/2012    Hives Grass Pollen-bermuda, Standard  03/11/2011    Swelling Iodine  09/21/2017    Hives Malt Extract  09/10/2012    Unknown (comments) Oak  09/10/2012    Unknown (comments) Pcn [Penicillins]  02/21/2011    Unknown (comments) Ragweed  09/10/2012    Unknown (comments) Shellfish Containing Products  09/10/2012    Unknown (comments) Yeast, Dried  09/10/2012    Unknown (comments) Current Immunizations  Never Reviewed No immunizations on file. Not reviewed this visit You Were Diagnosed With   
  
 Codes Comments Right acute serous otitis media, recurrence not specified    -  Primary ICD-10-CM: H65.01 
ICD-9-CM: 381.01 Eustachian tube dysfunction, bilateral     ICD-10-CM: I45.96 ICD-9-CM: 381.81 Environmental and seasonal allergies     ICD-10-CM: J30.89 ICD-9-CM: 477.8 Vitals BP Pulse Temp Resp Height(growth percentile) Weight(growth percentile) 118/88 (BP 1 Location: Left arm, BP Patient Position: Sitting) 94 96.8 °F (36 °C) (Oral) 16 5' 5\" (1.651 m) 253 lb 9.6 oz (115 kg) SpO2 BMI OB Status Smoking Status 96% 42.2 kg/m2 Having regular periods Former Smoker Vitals History BMI and BSA Data  Body Mass Index Body Surface Area  
 42.2 kg/m 2 2.3 m 2  
  
 Preferred Pharmacy [facility]acy Name Phone [id number], [address] [facility] [phone] Your Updated Medication List  
  
   
This list is accurate as of: 9/21/17  8:33 AM.  Always use your most recent med list.  
  
  
  
  
 albuterol 90 mcg/actuation inhaler Commonly known as:  PROAIR HFA Take 1 Puff by inhalation every four (4) hours as needed for Wheezing. ALLEGRA 180 mg tablet Generic drug:  fexofenadine Take 180 mg by mouth daily. * buPROPion  mg tablet Commonly known as:  WELLBUTRIN XL  
TAKE 3 TABLETS BY MOUTH ONCE DAILY IN THE MORNING  
  
 * buPROPion  mg XL tablet Commonly known as:  WELLBUTRIN XL  
TAKE 1 TABLET BY MOUTH ONCE A DAY  
  
 doxycycline 100 mg capsule Commonly known as:  Donya Martinez Take 1 Cap by mouth two (2) times a day for 10 days. FLONASE 50 mcg/actuation nasal spray Generic drug:  fluticasone 2 Sprays by Both Nostrils route daily. metFORMIN 500 mg tablet Commonly known as:  GLUCOPHAGE Take 500 mg by mouth two (2) times daily (with meals). Two pills in am and two pill in pm.  titrating to goal dose. phentermine 15 mg capsule Commonly known as:  ADIPEX_P Take 15 mg by mouth every morning. SAXENDA 3 mg/0.5 mL (18 mg/3 mL) pen Generic drug:  liraglutide SEE ATTACH SHEET USE AS DIRECTED  
  
 topiramate 25 mg tablet Commonly known as:  TOPAMAX Take 1 Tab by mouth two (2) times daily (with meals). * Notice: This list has 2 medication(s) that are the same as other medications prescribed for you. Read the directions carefully, and ask your doctor or other care provider to review them with you. Prescriptions Sent to Pharmacy Refills  
 doxycycline (MONODOX) 100 mg capsule 0 Sig: Take 1 Cap by mouth two (2) times a day for 10 days. Class: Normal  
 Pharmacy: [address] [place] [address], [address] [place] [address] Ph #: [phone] Route: Oral  
  
Follow-up Instructions Return if symptoms worsen or fail to improve. Patient Instructions Middle Ear Fluid: Care Instructions Your Care Instructions Fluid often builds up inside the ear during a cold or allergies. Usually the fluid drains away, but sometimes a small tube in the ear, called the eustachian tube, stays blocked for months. Symptoms of fluid buildup may include: · Popping, ringing, or a feeling of fullness or pressure in the ear. · Trouble hearing. · Balance problems and dizziness. In most cases, you can treat yourself at home. Follow-up care is a key part of your treatment and safety. Be sure to make and go to all appointments, and call your doctor if you are having problems. It's also a good idea to know your test results and keep a list of the medicines you take. How can you care for yourself at home? · In most cases, the fluid clears up within a few months without treatment. You may need more tests if the fluid does not clear up after 3 months. · If your doctor prescribed antibiotics, take them as directed. Do not stop taking them just because you feel better. You need to take the full course of antibiotics. When should you call for help? Call your doctor now or seek immediate medical care if: 
· You have symptoms of infection, such as: 
¨ Increased pain, swelling, warmth, or redness. ¨ Pus draining from the area. ¨ A fever. Watch closely for changes in your health, and be sure to contact your doctor if: 
· You notice changes in hearing. · You do not get better as expected. Where can you learn more? Go to http://marcelle-yvonne.info/. Enter A182 in the search box to learn more about \"Middle Ear Fluid: Care Instructions. \" Current as of: July 29, 2016 Content Version: 11.3 © 6716-0480 [employer].  Care instructions adapted under license by [employer] (which disclaims liability or warranty for this information). If you have questions about a medical condition or this instruction, always ask your healthcare professional. [address] any warranty or liability for your use of this information. Introducing \[facility]\"" & [facility] SERVICES! Dear Vadim: 
Thank you for requesting a [employer] account. Our records indicate that you already have an active Seeo account. You can access your account anytime at [email or web address] Did you know that you can access your hospital and ER discharge instructions at any time in Seeo? You can also review all of your test results from your hospital stay or ER visit. Additional Information If you have questions, please visit the Frequently Asked Questions section of the Seeo website at [email or web address] Remember, Seeo is NOT to be used for urgent needs. For medical emergencies, dial 911. Now available from your iPhone and Android! Please provide this summary of care documentation to your next provider. Your primary care clinician is listed as Hilda Tejeda. If you have any questions after today's visit, please call [phone].

## 2018-05-03 ENCOUNTER — OFFICE VISIT (OUTPATIENT)
Dept: FAMILY MEDICINE CLINIC | Age: 33
End: 2018-05-03

## 2018-05-03 VITALS
TEMPERATURE: 98.7 F | BODY MASS INDEX: 42.7 KG/M2 | OXYGEN SATURATION: 98 % | SYSTOLIC BLOOD PRESSURE: 122 MMHG | WEIGHT: 256.3 LBS | RESPIRATION RATE: 16 BRPM | HEIGHT: 65 IN | DIASTOLIC BLOOD PRESSURE: 86 MMHG | HEART RATE: 92 BPM

## 2018-05-03 DIAGNOSIS — J01.00 ACUTE NON-RECURRENT MAXILLARY SINUSITIS: ICD-10-CM

## 2018-05-03 DIAGNOSIS — J30.9 ALLERGIC RHINITIS, UNSPECIFIED SEASONALITY, UNSPECIFIED TRIGGER: Primary | ICD-10-CM

## 2018-05-03 DIAGNOSIS — H92.01 OTALGIA, RIGHT EAR: ICD-10-CM

## 2018-05-03 RX ORDER — CETIRIZINE HYDROCHLORIDE, PSEUDOEPHEDRINE HYDROCHLORIDE 5; 120 MG/1; MG/1
1 TABLET, FILM COATED, EXTENDED RELEASE ORAL 2 TIMES DAILY
COMMUNITY
End: 2018-08-28 | Stop reason: ALTCHOICE

## 2018-05-03 RX ORDER — FLUTICASONE PROPIONATE 50 MCG
2 SPRAY, SUSPENSION (ML) NASAL DAILY
Qty: 1 BOTTLE | Refills: 0 | Status: ON HOLD | OUTPATIENT
Start: 2018-05-03 | End: 2019-02-04 | Stop reason: CLARIF

## 2018-05-03 RX ORDER — PHENTERMINE HYDROCHLORIDE 37.5 MG/1
TABLET ORAL
Refills: 0 | COMMUNITY
Start: 2018-03-06 | End: 2018-11-28 | Stop reason: ALTCHOICE

## 2018-05-03 RX ORDER — DOXYCYCLINE 100 MG/1
100 CAPSULE ORAL 2 TIMES DAILY
Qty: 14 CAP | Refills: 0 | Status: SHIPPED | OUTPATIENT
Start: 2018-05-03 | End: 2018-05-10

## 2018-05-03 NOTE — PROGRESS NOTES
Chief Complaint   Patient presents with    Pressure Behind the Eyes     Sinus problems on and off, now with ear pain in right, temperature spikes to 99.5

## 2018-05-03 NOTE — PROGRESS NOTES
HISTORY OF PRESENT ILLNESS  Adalberto Sanchez is a 28 y.o. female. Patient reports worsening sinus pressure and congestion with low-grade fevers for the past 3-4 days. She has been dealing with severe allergic rhinitis for over 1 month. She has been taking sudafed and zyrtec with little relief. No nasal spray yet. Nasal drainage ranges from yellow-green to clear at times. She also reports right ear pain the past few days. Visit Vitals    /86    Pulse 92    Temp 98.7 °F (37.1 °C) (Oral)    Resp 16    Ht 5' 5\" (1.651 m)    Wt 256 lb 4.8 oz (116.3 kg)    LMP 05/03/2018 (Exact Date)    SpO2 98%    BMI 42.65 kg/m2       Pressure Behind the Eyes   The history is provided by the patient. This is a new problem. Episode onset: allergic rhinitis started over 1 month ago, sinus pressure worse in the last 3-4 days. The problem has been gradually worsening. Treatments tried: zyrtec, sudafed. The treatment provided no relief. Review of Systems   HENT: Positive for congestion, ear pain and sinus pain. Physical Exam   Constitutional: She is oriented to person, place, and time. She appears well-developed and well-nourished. HENT:   Head: Normocephalic. Right Ear: Hearing, tympanic membrane, external ear and ear canal normal.   Left Ear: Hearing, tympanic membrane, external ear and ear canal normal.   Nose: Mucosal edema (right worse than left) and rhinorrhea (purulent) present. Right sinus exhibits frontal sinus tenderness. Left sinus exhibits frontal sinus tenderness. Mouth/Throat: Uvula is midline, oropharynx is clear and moist and mucous membranes are normal.   Clear fluid noted behind both TMs with slightly dull light reflex   Neck: Normal range of motion. Neck supple. Cardiovascular: Normal rate, regular rhythm and normal heart sounds. Pulmonary/Chest: Effort normal and breath sounds normal.   Neurological: She is alert and oriented to person, place, and time. Skin: Skin is warm and dry. Psychiatric: She has a normal mood and affect. ASSESSMENT and PLAN    ICD-10-CM ICD-9-CM    1. Allergic rhinitis, unspecified seasonality, unspecified trigger J30.9 477.9    2.  Acute non-recurrent maxillary sinusitis J01.00 461.0      Orders Placed This Encounter    phentermine (ADIPEX-P) 37.5 mg tablet    cetirizine-psuedoePHEDrine (ZYRTEC-D) 5-120 mg per tablet    doxycycline (VIBRAMYCIN) 100 mg capsule    fluticasone (FLONASE) 50 mcg/actuation nasal spray   start flonase today and saline rinses 3-4 times per day  If no improvement in 3-5 days then start antibiotic  Limit sudafed to 5 days to avoid over drying  Probiotic or daily yogurt if starting antibiotic

## 2018-08-28 ENCOUNTER — OFFICE VISIT (OUTPATIENT)
Dept: FAMILY MEDICINE CLINIC | Age: 33
End: 2018-08-28

## 2018-08-28 VITALS
HEART RATE: 96 BPM | RESPIRATION RATE: 16 BRPM | OXYGEN SATURATION: 100 % | HEIGHT: 65 IN | WEIGHT: 254.4 LBS | TEMPERATURE: 98.9 F | SYSTOLIC BLOOD PRESSURE: 121 MMHG | BODY MASS INDEX: 42.38 KG/M2 | DIASTOLIC BLOOD PRESSURE: 87 MMHG
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
## 2018-08-28 DIAGNOSIS — R53.83 OTHER FATIGUE: Primary | ICD-10-CM

## 2018-08-28 DIAGNOSIS — M79.10 MYALGIA: ICD-10-CM

## 2018-08-28 DIAGNOSIS — S30.861A TICK BITE OF ABDOMEN, INITIAL ENCOUNTER: ICD-10-CM

## 2018-08-28 NOTE — MR AVS SNAPSHOT
[address] [facility] 
 
 
 [address] [facility] 104 [address] [place] [address] 
[phone] Patient: Rigo Izquierdo MRN: [id number] RER:[id number] Visit Information Date & Time Provider Department Dept. Phone Encounter #  
 8/28/2018  3:30 PM Shari Soliman, [address] [facility] [phone] [id number] Follow-up Instructions Return if symptoms worsen or fail to improve. Upcoming Health Maintenance Date Due Pneumococcal 19-64 Medium Risk (1 of 1 - PPSV23) 11/1/2004 DTaP/Tdap/Td series (1 - Tdap) 11/1/2006 PAP AKA CERVICAL CYTOLOGY 11/1/2006 Influenza Age 5 to Adult 8/1/2018 Allergies as of 8/28/2018  Review Complete On: 8/28/2018 By: Kyaw Leiva LPN Severity Noted Reaction Type Reactions Tomato Medium 07/17/2013   Systemic Itching Amoxicillin  12/10/2012    Hives Grass Pollen-bermuda, Standard  03/11/2011    Swelling Iodine  09/21/2017    Hives Malt Extract  09/10/2012    Unknown (comments) Oak  09/10/2012    Unknown (comments) Pcn [Penicillins]  02/21/2011    Unknown (comments) Ragweed  09/10/2012    Unknown (comments) Shellfish Containing Products  09/10/2012    Unknown (comments) Yeast, Dried  09/10/2012    Unknown (comments) Current Immunizations  Never Reviewed No immunizations on file. Not reviewed this visit You Were Diagnosed With   
  
 Codes Comments Other fatigue    -  Primary ICD-10-CM: R53.83 ICD-9-CM: 780.79 Myalgia     ICD-10-CM: M79.1 ICD-9-CM: 729.1 Tick bite of abdomen, initial encounter     ICD-10-CM: S04.861V, W57. Monica Pino ICD-9-CM: 911.4, E906.4 Vitals BP Pulse Temp Resp Height(growth percentile) Weight(growth percentile) 121/87 96 98.9 °F (37.2 °C) (Oral) 16 5' 5\" (1.651 m) 254 lb 6.4 oz (115.4 kg) LMP SpO2 BMI OB Status Smoking Status 07/28/2018 (Exact Date) 100% 42.33 kg/m2 Having regular periods Former Smoker Vitals History BMI and BSA Data Body Mass Index Body Surface Area  
 42.33 kg/m 2 2.3 m 2 Preferred Pharmacy Pharmacy Name Phone [facility][id number] Mcknight. [phone] Your Updated Medication List  
  
   
This list is accurate as of 8/28/18  3:58 PM.  Always use your most recent med list.  
  
  
  
  
 albuterol 90 mcg/actuation inhaler Commonly known as:  PROAIR HFA Take 1 Puff by inhalation every four (4) hours as needed for Wheezing. ALLEGRA 180 mg tablet Generic drug:  fexofenadine Take 180 mg by mouth daily. buPROPion  mg XL tablet Commonly known as:  WELLBUTRIN XL  
TAKE 1 TABLET BY MOUTH ONCE A DAY  
  
 * FLONASE 50 mcg/actuation nasal spray Generic drug:  fluticasone 2 Sprays by Both Nostrils route daily. * fluticasone 50 mcg/actuation nasal spray Commonly known as:  Kendall [place] 2 Sprays by Both Nostrils route daily. * phentermine 15 mg capsule Commonly known as:  ADIPEX_P Take 15 mg by mouth every morning. * phentermine 37.5 mg tablet Commonly known as:  ADIPEX-P  
TAKE 1/2 TABLET BY MOUTH EVERY MORNING AND1/2 TAB AT 3PM  
  
 topiramate 25 mg tablet Commonly known as:  TOPAMAX Take 1 Tab by mouth two (2) times daily (with meals). * Notice: This list has 4 medication(s) that are the same as other medications prescribed for you. Read the directions carefully, and ask your doctor or other care provider to review them with you. We Performed the Following LYME AB, IGM, WITH REFLEX WBLOT [[id number] Custom] REFERRAL TO PRIMARY CARE [RYZ792 CPT(R)] Follow-up Instructions Return if symptoms worsen or fail to improve. Referral Information Referral ID Referred By Referred To  
  
 [id number] MINAN AGUIRRE NP   
   [address] Phone: [phone] Fax: [phone] Visits Status Start Date End Date 1 New Request 8/28/18 8/28/19 If your referral has a status of pending review or denied, additional information will be sent to support the outcome of this decision. Patient Instructions Tick Bite: Care Instructions Your Care Instructions Ticks are small spiderlike animals. They bite to fasten themselves onto your skin and feed on your blood. Ticks can carry diseases. But most ticks do not carry diseases, and most tick bites do not cause serious health problems. Some people may have an allergic reaction to a tick bite. This reaction may be mild, with symptoms like itching and swelling. In rare cases, a severe allergic reaction may occur. Most of the time, all you need to do for a tick bite is relieve any symptoms you may have. Follow-up care is a key part of your treatment and safety. Be sure to make and go to all appointments, and call your doctor if you are having problems. It's also a good idea to know your test results and keep a list of the medicines you take. How can you care for yourself at home? · Put ice or a cold pack on the bite for 15 to 20 minutes once an hour. Put a thin cloth between the ice and your skin. · Try an over-the-counter medicine to relieve itching, redness, swelling, and pain. Be safe with medicines. Read and follow all instructions on the label. ¨ Take an antihistamine medicine, such as a nondrowsy one like loratadine (Claritin) or one that might make you sleepy like diphenhydramine (Benadryl). These medicines may help relieve itching, redness, and swelling. ¨ Use a spray of local anesthetic that contains benzocaine, such as Solarcaine. It may help relieve pain. If your skin reacts to the spray, stop using it. ¨ Put calamine lotion on the skin. It may help relieve itching. To avoid tick bites · Avoid ticks: 
¨ Learn where ticks are found in your community, and stay away from those areas if possible. ¨ Cover as much of your body as possible when you work or play in grassy or wooded areas. ¨ Use insect repellents, such as products containing DEET. You can spray them on your skin. ¨ Take steps to control ticks on your property if you live in an area where Lyme disease occurs. Clear leaves, brush, tall grasses, woodpiles, and stone fences from around your house and the edges of your yard or garden. This may help get rid of ticks. · When you come in from outdoors, check your body for ticks, including your groin, head, and underarms. The ticks may be about the size of a sesame seed. If no one else can help you check for ticks on your scalp, comb your hair with a fine-tooth comb. · If you find a tick, remove it quickly. Use tweezers to grasp the tick as close to its mouth (the part in your skin) as possible. Slowly pull the tick straight out-do not twist or yank-until its mouth releases from your skin. If part of the tick stays in the skin, leave it alone. It will likely come out on its own in a few days. · Ticks can come into your house on clothing, outdoor gear, and pets. These ticks can fall off and attach to you. ¨ Check your clothing and outdoor gear. Remove any ticks you find. Then put your clothing in a clothes dryer on high heat for 1 hour to kill any ticks that might remain. ¨ Check your pets for ticks after they have been outdoors. · When hiking in the woods, carry a small dry jar or ziplock bag. If you find a tick on your body, remove the tick and put it in the jar or bag. Store the container in the freezer so you can give it to your doctor if symptoms develop. The tick can be tested to learn whether it is carrying the bacteria that cause Lyme disease. When should you call for help? Call 911 anytime you think you may need emergency care. For example, call if: 
  · You have symptoms of a severe allergic reaction. These may include: 
¨ Sudden raised, red areas (hives) all over your body. ¨ Swelling of the throat, mouth, lips, or tongue. ¨ Trouble breathing. ¨ Passing out (losing consciousness). Or you may feel very lightheaded or suddenly feel weak, confused, or restless.  
 Call your doctor now or seek immediate medical care if: 
  · You have signs of infection, such as: 
¨ Increased pain, swelling, warmth, or redness around the bite. ¨ Red streaks leading from the bite. ¨ Pus draining from the bite. ¨ A fever.  
 Watch closely for changes in your health, and be sure to contact your doctor if: 
  · You develop a new rash.  
  · You have joint pain.  
  · You are very tired.  
  · You have flu-like symptoms.  
  · You have symptoms for more than 1 week. Where can you learn more? Go to http://marcelle-yvonne.info/. Enter H871 in the search box to learn more about \"Tick Bite: Care Instructions. \" Current as of: November 20, 2017 Content Version: 11.7 © 6127-6939 [employer]. Care instructions adapted under license by [employer] (which disclaims liability or warranty for this information). If you have questions about a medical condition or this instruction, always ask your healthcare professional. [place] [address] any warranty or liability for your use of this information. Introducing [facility]! Dear Lucien Chaudhry: 
Thank you for requesting a [employer] account. Our records indicate that you already have an active [employer] account. You can access your account anytime at [email or web address] Did you know that you can access your hospital and ER discharge instructions at any time in [employer]? You can also review all of your test results from your hospital stay or ER visit. Additional Information If you have questions, please visit the Frequently Asked Questions section of the [employer] website at [email or web address] Remember, [employer] is NOT to be used for urgent needs.  For medical emergencies, dial 911. Now available from your iPhone and Android! Please provide this summary of care documentation to your next provider. Lyme Disease Testing Disclaimer:   
 § 38.6-3122.1. (Expires July 1, 2018) Lyme disease testing information disclosure. A. Every licensee or his in-office designee who orders a laboratory test for the presence of Lyme disease shall provide to the patient or his legal representative the following written information: \"ACCORDING TO THE CENTERS FOR DISEASE CONTROL AND PREVENTION, AS OF 2011 LYME DISEASE IS THE SIXTH FASTEST GROWING DISEASE IN THE [place]. YOUR HEALTH CARE PROVIDER HAS ORDERED A LABORATORY TEST FOR THE PRESENCE OF LYME DISEASE FOR YOU. CURRENT LABORATORY TESTING FOR LYME DISEASE CAN BE PROBLEMATIC AND STANDARD LABORATORY TESTS OFTEN RESULT IN FALSE NEGATIVE AND FALSE POSITIVE RESULTS, AND IF DONE TOO EARLY, YOU MAY NOT HAVE PRODUCED ENOUGH ANTIBODIES TO BE CONSIDERED POSITIVE BECAUSE YOUR IMMUNE RESPONSE REQUIRES TIME TO DEVELOP ANTIBODIES. IF YOU ARE TESTED FOR LYME DISEASE, AND THE RESULTS ARE NEGATIVE, THIS DOES NOT NECESSARILY MEAN YOU DO NOT HAVE LYME DISEASE. IF YOU CONTINUE TO EXPERIENCE SYMPTOMS, YOU SHOULD CONTACT YOUR HEALTH CARE PROVIDER AND INQUIRE ABOUT THE APPROPRIATENESS OF RETESTING OR ADDITIONAL TREATMENT. \"  
B. Licensees shall be immune from civil liability for the provision of the written information required by this section absent gross negligence or willful misconduct. Your primary care clinician is listed as Delores Kaplan. If you have any questions after today's visit, please call [phone].

## 2018-08-28 NOTE — PROGRESS NOTES
Chief Complaint   Patient presents with    Insect Bite     Multiple tick bites over summer. Woke with one this morniing and having fatigue, loss of appetite and joint pain     she is a 28y.o. year old female who presents for evalution. She states she has removed several ticks from her body over the summer, most of them very tiny. She did have a rash around one of the bites that was red and lasted several weeks. She frequently is in a very woodsy area. She has neighbors that have contracted lyme disease over the summer. She also states she has fatigue and joint pain off and on for several weeks and has has fevers of 99.6 and below intermittently. Reviewed PmHx, RxHx, FmHx, SocHx, AllgHx and updated and dated in the chart. Review of Systems - negative except as listed above in the HPI    Objective:     Vitals:    08/28/18 1534   BP: 121/87   Pulse: 96   Resp: 16   Temp: 98.9 °F (37.2 °C)   TempSrc: Oral   SpO2: 100%   Weight: 254 lb 6.4 oz (115.4 kg)   Height: 5' 5\" (1.651 m)       Current Outpatient Prescriptions   Medication Sig    phentermine (ADIPEX-P) 37.5 mg tablet TAKE 1/2 TABLET BY MOUTH EVERY MORNING AND1/2 TAB AT 3PM    fluticasone (FLONASE) 50 mcg/actuation nasal spray 2 Sprays by Both Nostrils route daily.  buPROPion XL (WELLBUTRIN XL) 300 mg XL tablet TAKE 1 TABLET BY MOUTH ONCE A DAY    phentermine (ADIPEX_P) 15 mg capsule Take 15 mg by mouth every morning.  topiramate (TOPAMAX) 25 mg tablet Take 1 Tab by mouth two (2) times daily (with meals).  fluticasone (FLONASE) 50 mcg/actuation nasal spray 2 Sprays by Both Nostrils route daily.  fexofenadine (ALLEGRA) 180 mg tablet Take 180 mg by mouth daily.  albuterol (PROAIR HFA) 90 mcg/actuation inhaler Take 1 Puff by inhalation every four (4) hours as needed for Wheezing.  (Patient taking differently: Take 2 Puffs by inhalation every four (4) hours as needed for Wheezing.)     No current facility-administered medications for this visit.        Physical Examination: General appearance - alert, well appearing, and in no distress  Mental status - alert, oriented to person, place, and time  Eyes - pupils equal and reactive, extraocular eye movements intact  Ears - bilateral TM's and external ear canals normal  Nose - normal and patent, no erythema, discharge or polyps  Mouth - mucous membranes moist, pharynx normal without lesions  Neck - supple, no significant adenopathy  Lymphatics - no palpable lymphadenopathy, no hepatosplenomegaly  Chest - clear to auscultation, no wheezes, rales or rhonchi, symmetric air entry  Heart - normal rate, regular rhythm, normal S1, S2, no murmurs, rubs, clicks or gallops  Abdomen - soft, nontender, nondistended, no masses or organomegaly  Neurological - alert, oriented, normal speech, no focal findings or movement disorder noted  Musculoskeletal - no joint tenderness, deformity or swelling  Extremities - peripheral pulses normal, no pedal edema, no clubbing or cyanosis  Skin - normal coloration and turgor, no rashes, no suspicious skin lesions noted. One red dot/ scab from latest tick that was removed yesterday. No rashes, no swelling. Assessment/ Plan:   Diagnoses and all orders for this visit:    1. Other fatigue  -     LYME AB, IGM, WITH REFLEX WBLOT  -     REFERRAL TO PRIMARY CARE    2. Myalgia  -     LYME AB, IGM, WITH REFLEX WBLOT  -     REFERRAL TO PRIMARY CARE    3. Tick bite of abdomen, initial encounter  -     LYME AB, IGM, WITH REFLEX WBLOT  -     REFERRAL TO PRIMARY CARE       Follow-up Disposition:  Return if symptoms worsen or fail to improve. I have discussed the diagnosis with the patient and the intended plan as seen in the above orders. The patient has received an after-visit summary and questions were answered concerning future plans. Pt conveyed understanding of plan.     Medication Side Effects and Warnings were discussed with patient      Angelica Romano NP

## 2018-08-28 NOTE — PATIENT INSTRUCTIONS
Tick Bite: Care Instructions  Your Care Instructions    Ticks are small spiderlike animals. They bite to fasten themselves onto your skin and feed on your blood. Ticks can carry diseases. But most ticks do not carry diseases, and most tick bites do not cause serious health problems. Some people may have an allergic reaction to a tick bite. This reaction may be mild, with symptoms like itching and swelling. In rare cases, a severe allergic reaction may occur. Most of the time, all you need to do for a tick bite is relieve any symptoms you may have. Follow-up care is a key part of your treatment and safety. Be sure to make and go to all appointments, and call your doctor if you are having problems. It's also a good idea to know your test results and keep a list of the medicines you take. How can you care for yourself at home? · Put ice or a cold pack on the bite for 15 to 20 minutes once an hour. Put a thin cloth between the ice and your skin. · Try an over-the-counter medicine to relieve itching, redness, swelling, and pain. Be safe with medicines. Read and follow all instructions on the label. ¨ Take an antihistamine medicine, such as a nondrowsy one like loratadine (Claritin) or one that might make you sleepy like diphenhydramine (Benadryl). These medicines may help relieve itching, redness, and swelling. ¨ Use a spray of local anesthetic that contains benzocaine, such as Solarcaine. It may help relieve pain. If your skin reacts to the spray, stop using it. ¨ Put calamine lotion on the skin. It may help relieve itching. To avoid tick bites  · Avoid ticks:  ¨ Learn where ticks are found in your community, and stay away from those areas if possible. ¨ Cover as much of your body as possible when you work or play in grassy or wooded areas. ¨ Use insect repellents, such as products containing DEET. You can spray them on your skin.   ¨ Take steps to control ticks on your property if you live in an area where Lyme disease occurs. Clear leaves, brush, tall grasses, woodpiles, and stone fences from around your house and the edges of your yard or garden. This may help get rid of ticks. · When you come in from outdoors, check your body for ticks, including your groin, head, and underarms. The ticks may be about the size of a sesame seed. If no one else can help you check for ticks on your scalp, comb your hair with a fine-tooth comb. · If you find a tick, remove it quickly. Use tweezers to grasp the tick as close to its mouth (the part in your skin) as possible. Slowly pull the tick straight out-do not twist or yank-until its mouth releases from your skin. If part of the tick stays in the skin, leave it alone. It will likely come out on its own in a few days. · Ticks can come into your house on clothing, outdoor gear, and pets. These ticks can fall off and attach to you. ¨ Check your clothing and outdoor gear. Remove any ticks you find. Then put your clothing in a clothes dryer on high heat for 1 hour to kill any ticks that might remain. ¨ Check your pets for ticks after they have been outdoors. · When hiking in the woods, carry a small dry jar or ziplock bag. If you find a tick on your body, remove the tick and put it in the jar or bag. Store the container in the freezer so you can give it to your doctor if symptoms develop. The tick can be tested to learn whether it is carrying the bacteria that cause Lyme disease. When should you call for help? Call 911 anytime you think you may need emergency care. For example, call if:    · You have symptoms of a severe allergic reaction. These may include:  ¨ Sudden raised, red areas (hives) all over your body. ¨ Swelling of the throat, mouth, lips, or tongue. ¨ Trouble breathing. ¨ Passing out (losing consciousness).  Or you may feel very lightheaded or suddenly feel weak, confused, or restless.    Call your doctor now or seek immediate medical care if:    · You have signs of infection, such as:  ¨ Increased pain, swelling, warmth, or redness around the bite. ¨ Red streaks leading from the bite. ¨ Pus draining from the bite. ¨ A fever.    Watch closely for changes in your health, and be sure to contact your doctor if:    · You develop a new rash.     · You have joint pain.     · You are very tired.     · You have flu-like symptoms.     · You have symptoms for more than 1 week. Where can you learn more? Go to http://marcelle-yvonne.info/. Enter V418 in the search box to learn more about \"Tick Bite: Care Instructions. \"  Current as of: November 20, 2017  Content Version: 11.7  © 6524-2713 [employer]. Care instructions adapted under license by [employer] (which disclaims liability or warranty for this information). If you have questions about a medical condition or this instruction, always ask your healthcare professional. [address] any warranty or liability for your use of this information.

## 2018-08-28 NOTE — PROGRESS NOTES
Chief Complaint   Patient presents with    Insect Bite     Multiple tick bites over summer.  Woke with one this morniing and having fatigue, loss of appetite and joint pain

## 2018-08-29 LAB — B BURGDOR IGM SER IA-ACNC: <0.8 INDEX (ref 0–0.79)

## 2018-08-29 NOTE — PROGRESS NOTES
Please notify her that her lyme antibody was negative and if she has continued symptoms, follow up with PCP.  Thank You

## 2018-08-30 NOTE — PROGRESS NOTES
Called and spoke with patient, verified name and date of birth, advised that lyme antibody was negative and advised if symptoms continue to follow up with PCP, pt appreciative of phone call and advised she has appointment on 9/10/18 with her primary care doctor, no further action needed at this time

## 2018-09-10 ENCOUNTER — OFFICE VISIT (OUTPATIENT)
Dept: INTERNAL MEDICINE CLINIC | Age: 33
End: 2018-09-10

## 2018-09-10 VITALS
WEIGHT: 250.6 LBS | HEIGHT: 65 IN | DIASTOLIC BLOOD PRESSURE: 70 MMHG | OXYGEN SATURATION: 98 % | RESPIRATION RATE: 18 BRPM | BODY MASS INDEX: 41.75 KG/M2 | SYSTOLIC BLOOD PRESSURE: 108 MMHG | HEART RATE: 101 BPM | TEMPERATURE: 98.7 F

## 2018-09-10 DIAGNOSIS — R53.82 CHRONIC FATIGUE: ICD-10-CM

## 2018-09-10 DIAGNOSIS — E53.8 B12 DEFICIENCY: ICD-10-CM

## 2018-09-10 DIAGNOSIS — E01.0 THYROMEGALY: ICD-10-CM

## 2018-09-10 DIAGNOSIS — Z00.00 PHYSICAL EXAM: Primary | ICD-10-CM

## 2018-09-10 DIAGNOSIS — E28.2 PCOS (POLYCYSTIC OVARIAN SYNDROME): ICD-10-CM

## 2018-09-10 DIAGNOSIS — Z23 NEED FOR VACCINE FOR TD (TETANUS-DIPHTHERIA): ICD-10-CM

## 2018-09-10 DIAGNOSIS — E66.01 OBESITY, MORBID (HCC): ICD-10-CM

## 2018-09-10 RX ORDER — LORAZEPAM 0.5 MG/1
TABLET ORAL
COMMUNITY
End: 2021-10-13 | Stop reason: ALTCHOICE

## 2018-09-10 RX ORDER — METFORMIN HYDROCHLORIDE 1000 MG/1
TABLET, FILM COATED, EXTENDED RELEASE ORAL
COMMUNITY
End: 2018-11-28 | Stop reason: DRUGHIGH

## 2018-09-10 NOTE — MR AVS SNAPSHOT
[address] [facility] [address] 
[phone] Patient: Юлия Carrillo MRN: [id number] QUR:37/1/4550 Visit Information Date & Time Provider Department Dept. Phone Encounter #  
 9/10/2018  9:00 AM Lino York, [address] [facility],[address] Internal Medicine [phone] [id number] Upcoming Health Maintenance Date Due Pneumococcal 19-64 Medium Risk (1 of 1 - PPSV23) 11/1/2004 DTaP/Tdap/Td series (1 - Tdap) 11/1/2006 PAP AKA CERVICAL CYTOLOGY 11/1/2006 Influenza Age 5 to Adult 8/1/2018 Allergies as of 9/10/2018  Review Complete On: 9/10/2018 By: Lino York MD  
  
 Severity Noted Reaction Type Reactions Tomato Medium 07/17/2013   Systemic Itching Amoxicillin  12/10/2012    Hives Grass Pollen-bermuda, Standard  03/11/2011    Swelling Iodine  09/21/2017    Hives Malt Extract  09/10/2012    Unknown (comments) Oak  09/10/2012    Unknown (comments) Pcn [Penicillins]  02/21/2011    Unknown (comments) Ragweed  09/10/2012    Unknown (comments) Shellfish Containing Products  09/10/2012    Unknown (comments) Yeast, Dried  09/10/2012    Unknown (comments) Current Immunizations  Reviewed on 9/10/2018 No immunizations on file. Reviewed by Lino York MD on 9/10/2018 at  9:28 AM  
You Were Diagnosed With   
  
 Codes Comments Physical exam    -  Primary ICD-10-CM: Z00.00 ICD-9-CM: V70.9 Obesity, morbid ([address]     ICD-10-CM: E66.01 
ICD-9-CM: 278.01 Chronic fatigue     ICD-10-CM: R53.82 
ICD-9-CM: 780.79 Need for vaccine for Td (tetanus-diphtheria)     ICD-10-CM: Riri Forts ICD-9-CM: V06.5 B12 deficiency     ICD-10-CM: E53.8 ICD-9-CM: 266.2 PCOS (polycystic ovarian syndrome)     ICD-10-CM: E28.2 ICD-9-CM: 256.4 Thyromegaly     ICD-10-CM: E01.0 ICD-9-CM: 240.9 Vitals BP Pulse Temp Resp Height(growth percentile) Weight(growth percentile) 108/70 (BP 1 Location: Right arm, BP Patient Position: Sitting) (!) 101 98.7 °F (37.1 °C) (Oral) 18 5' 5\" (1.651 m) 250 lb 9.6 oz (113.7 kg) LMP SpO2 BMI OB Status Smoking Status 09/01/2018 (Approximate) 98% 41.7 kg/m2 Having regular periods Former Smoker BMI and BSA Data Body Mass Index Body Surface Area 41.7 kg/m 2 2.28 m 2 Preferred Pharmacy Pharmacy Name Phone [id number], [address] [phone] Your Updated Medication List  
  
   
This list is accurate as of 9/10/18  9:43 AM.  Always use your most recent med list.  
  
  
  
  
 albuterol 90 mcg/actuation inhaler Commonly known as:  PROAIR HFA Take 1 Puff by inhalation every four (4) hours as needed for Wheezing. ALLEGRA 180 mg tablet Generic drug:  fexofenadine Take 180 mg by mouth daily. buPROPion  mg XL tablet Commonly known as:  WELLBUTRIN XL  
TAKE 1 TABLET BY MOUTH ONCE A DAY  
  
 diphLawanda(Acell),Tet Vac-PF 2 Lf-(2.5-5-3-5 mcg)-5Lf/0.5 mL susp Commonly known as:  ADACEL  
0.5 mL by IntraMUSCular route once for 1 dose. * FLONASE 50 mcg/actuation nasal spray Generic drug:  fluticasone 2 Sprays by Both Nostrils route daily. * fluticasone 50 mcg/actuation nasal spray Commonly known as:  Gale Serene 2 Sprays by Both Nostrils route daily. LORazepam 0.5 mg tablet Commonly known as:  ATIVAN Take  by mouth.  
  
 metFORMIN 1,000 mg Tg24 24 hour tablet Commonly known as:  Bob Michael Take  by mouth.  
  
 phentermine 37.5 mg tablet Commonly known as:  ADIPEX-P  
TAKE 1/2 TABLET BY MOUTH EVERY MORNING AND1/2 TAB AT 3PM  
  
 topiramate 25 mg tablet Commonly known as:  TOPAMAX Take 1 Tab by mouth two (2) times daily (with meals). * Notice: This list has 2 medication(s) that are the same as other medications prescribed for you.  Read the directions carefully, and ask your doctor or other care provider to review them with you. Prescriptions Sent to Pharmacy Refills diph,Pertuss,Acell,,Tet Vac-PF (ADACEL) 2 Lf-(2.5-5-3-5 mcg)-5Lf/0.5 mL susp 0 Si.5 mL by IntraMUSCular route once for 1 dose. Class: Normal  
 Pharmacy: [address] [place] [address], [address] [place] [address].  #: [phone] Route: IntraMUSCular We Performed the Following MARITZA W/REFLEX [60921 CPT(R)] CBC WITH AUTOMATED DIFF [15945 CPT(R)] CRP, HIGH SENSITIVITY [13913 CPT(R)] LIPID PANEL [25413 CPT(R)] LYME AB TOTAL W/RFLX W BLOT [[id number] Custom] METABOLIC PANEL, COMPREHENSIVE [33366 CPT(R)] SED RATE (ESR) [id number] CPT(R)] TSH 3RD GENERATION [03760 CPT(R)] VITAMIN B12 [id number] CPT(R)] VITAMIN D, 25 HYDROXY [id number] CPT(R)] To-Do List   
 09/10/2018 Imaging:  US THYROID/PARATHYROID/SOFT TISS Introducing [address]! Dear Christina Kimball: 
Thank you for requesting a [employer] account. Our records indicate that you already have an active [email or web address] account. You can access your account anytime at [email or web address] Did you know that you can access your hospital and ER discharge instructions at any time in [email or web address]? You can also review all of your test results from your hospital stay or ER visit. Additional Information If you have questions, please visit the Frequently Asked Questions section of the [email or web address] website at [email or web address] Remember, [email or web address] is NOT to be used for urgent needs. For medical emergencies, dial 911. Now available from your iPhone and Android! Please provide this summary of care documentation to your next provider. Lyme Disease Testing Disclaimer:   
 § 57.6-1325.1. (Expires 2018) Lyme disease testing information disclosure.   
A. Every licensee or his in-office designee who orders a laboratory test for the presence of Lyme disease shall provide to the patient or his legal representative the following written information: \"ACCORDING TO THE CENTERS FOR DISEASE CONTROL AND PREVENTION, AS OF 2011 LYME DISEASE IS THE SIXTH FASTEST GROWING DISEASE IN THE [place]. YOUR HEALTH CARE PROVIDER HAS ORDERED A LABORATORY TEST FOR THE PRESENCE OF LYME DISEASE FOR YOU. CURRENT LABORATORY TESTING FOR LYME DISEASE CAN BE PROBLEMATIC AND STANDARD LABORATORY TESTS OFTEN RESULT IN FALSE NEGATIVE AND FALSE POSITIVE RESULTS, AND IF DONE TOO EARLY, YOU MAY NOT HAVE PRODUCED ENOUGH ANTIBODIES TO BE CONSIDERED POSITIVE BECAUSE YOUR IMMUNE RESPONSE REQUIRES TIME TO DEVELOP ANTIBODIES. IF YOU ARE TESTED FOR LYME DISEASE, AND THE RESULTS ARE NEGATIVE, THIS DOES NOT NECESSARILY MEAN YOU DO NOT HAVE LYME DISEASE. IF YOU CONTINUE TO EXPERIENCE SYMPTOMS, YOU SHOULD CONTACT YOUR HEALTH CARE PROVIDER AND INQUIRE ABOUT THE APPROPRIATENESS OF RETESTING OR ADDITIONAL TREATMENT. \"  
B. Licensees shall be immune from civil liability for the provision of the written information required by this section absent gross negligence or willful misconduct. Your primary care clinician is listed as Felicity Gamble. If you have any questions after today's visit, please call [phone].

## 2018-09-10 NOTE — PROGRESS NOTES
Visit Vitals    /70 (BP 1 Location: Right arm, BP Patient Position: Sitting)    Pulse (!) 101    Temp 98.7 °F (37.1 °C) (Oral)    Resp 18    Ht 5' 5\" (1.651 m)    Wt 250 lb 9.6 oz (113.7 kg)    SpO2 98%    BMI 41.7 kg/m2     Chief Complaint   Patient presents with   [place]     1. Have you been to the ER, urgent care clinic since your last visit? Hospitalized since your last visit? [facility]- s/s of Lyme Disease     2. Have you seen or consulted any other health care providers outside of the [address] [place] [address] since your last visit? Include any pap smears or colon screening.  [facility] for a Sinus Infection

## 2018-09-10 NOTE — PROGRESS NOTES
Written by Denise Yanez, as dictated by Dr. Becca Monge MD.    Ketty Jarrett is a 28 y.o. female. HPI  The patient comes in today for a complete physical examination and fasting labs. Her pulse is 101 and she notes that she is anxious right now. She denies heartburn unless she eats spicy food. She denies constipation. She was tested for Lyme 2 weeks ago and she tested negative. She has been having intermittent fatigue, joint pains, and muscle aches. The patient notes that sometimes her joints are so swollen that she cannot pick anything up. She has been having intermittent low grade fevers since 05/2018 and notes that the occur multiple times during the week. She was having allergy shots and notes that she would get a fever after receiving allergy shots. Her doctor does not believe its the shots because she was given a placebo injection and still had a fever. The patient reports that she has not been taking oral vitamin B12. The patient notes that she has a fhx of thyroid problems but has not had a thyroid US. She notes that she has cut out a lot of her foods in diet, in part for allergies but also to lose weight. She weighs 250 lbs today. She was put back on metformin 2 weeks ago and phentermine. The patient is taking metformin 1,000 mg for PCOS. She takes 15 mg phentermine when taking Allegra-D and 37.5 mg when not taking Allergra-D. She is followed by weight loss. She was prescribed Wellbutrin  mg and Topamax by her psychiatrist. She was diagnosed with complex PTSD by her psychologist.    The patient notes that she has not had a pap smear in about 3 years and will make an appointment with her OB/GYN. She does not recall when she received her last Tdap and believes she is overdue. She notes that she does not get flu shots.     Patient Active Problem List   Diagnosis Code    Anxiety disorder F41.9    Allergic asthma J45.909    Chronic tension-type headache, not intractable G44.229    Obesity, morbid ([facility]) E66.01        Current Outpatient Prescriptions on File Prior to Visit   Medication Sig Dispense Refill    phentermine (ADIPEX-P) 37.5 mg tablet TAKE 1/2 TABLET BY MOUTH EVERY MORNING AND1/2 TAB AT 3PM  0    buPROPion XL (WELLBUTRIN XL) 300 mg XL tablet TAKE 1 TABLET BY MOUTH ONCE A DAY  2    topiramate (TOPAMAX) 25 mg tablet Take 1 Tab by mouth two (2) times daily (with meals). 180 Tab 1    fluticasone (FLONASE) 50 mcg/actuation nasal spray 2 Sprays by Both Nostrils route daily.  fexofenadine (ALLEGRA) 180 mg tablet Take 180 mg by mouth daily.  fluticasone (FLONASE) 50 mcg/actuation nasal spray 2 Sprays by Both Nostrils route daily. 1 Bottle 0    albuterol (PROAIR HFA) 90 mcg/actuation inhaler Take 1 Puff by inhalation every four (4) hours as needed for Wheezing. (Patient taking differently: Take 2 Puffs by inhalation every four (4) hours as needed for Wheezing.) 1 Inhaler 3     No current facility-administered medications on file prior to visit.         Allergies   Allergen Reactions    Tomato Itching    Amoxicillin Hives    Grass Pollen-Bermuda, Standard Swelling    Iodine Hives    Malt Extract Unknown (comments)    Oak Unknown (comments)    Pcn [Penicillins] Unknown (comments)    Ragweed Unknown (comments)    Shellfish Containing Products Unknown (comments)    Yeast, Dried Unknown (comments)       Past Medical History:   Diagnosis Date    Asthma     Depression     Family history of skin cancer     maternal grandfather - melanoma    Headache(784.0)     PCOS (polycystic ovarian syndrome)     PTSD (post-traumatic stress disorder)     Sun-damaged skin     Tanning bed exposure        Past Surgical History:   Procedure Laterality Date    HX TONSIL AND ADENOIDECTOMY      HX TONSILLECTOMY      HX WISDOM TEETH EXTRACTION         Family History   Problem Relation Age of Onset    Psychiatric Disorder Father     Cancer Maternal Aunt     Heart Disease Maternal Grandfather     Hypertension Maternal Grandfather     Cancer Paternal Grandmother     Diabetes Paternal Grandmother     Hypertension Paternal Grandmother        Social History     Social History    Marital status: SINGLE     Spouse name: N/A    Number of children: N/A    Years of education: N/A     Occupational History    Not on file. Social History Main Topics    Smoking status: Former Smoker    Smokeless tobacco: Never Used    Alcohol use 1.0 oz/week     2 Glasses of wine per week    Drug use: No    Sexual activity: Yes     Partners: Male     Other Topics Concern    Not on file     Social History Narrative    ** Merged History Encounter **            Office Visit on 08/28/2018   Component Date Value Ref Range Status    Lyme Disease Ab, IgM, QT 08/28/2018 <0.80  0.00 - 0.79 index Final       Review of Systems   Constitutional: Positive for fever and malaise/fatigue. HENT: Negative for congestion. Eyes: Negative for blurred vision and pain. Respiratory: Negative for cough and shortness of breath. Cardiovascular: Negative for chest pain and palpitations. Gastrointestinal: Negative for abdominal pain and heartburn. Genitourinary: Negative for frequency and urgency. Musculoskeletal: Positive for joint pain and myalgias. Neurological: Negative for dizziness, tingling, sensory change, weakness and headaches. Endo/Heme/Allergies: Positive for environmental allergies. Psychiatric/Behavioral: Negative for depression, memory loss and substance abuse. The patient is nervous/anxious. Visit Vitals    /70 (BP 1 Location: Right arm, BP Patient Position: Sitting)    Pulse (!) 101    Temp 98.7 °F (37.1 °C) (Oral)    Resp 18    Ht 5' 5\" (1.651 m)    Wt 250 lb 9.6 oz (113.7 kg)    LMP 09/01/2018 (Approximate)    SpO2 98%    BMI 41.7 kg/m2       Physical Exam   Constitutional: She is oriented to person, place, and time.  She appears well-developed. No distress. Morbidly obese   HENT:   Right Ear: External ear normal.   Left Ear: External ear normal.   BL maxillary sinus tenderness   Eyes: Conjunctivae and EOM are normal. Right eye exhibits no discharge. Left eye exhibits no discharge. Neck: Normal range of motion. Neck supple. Thyromegaly present. Cardiovascular: Normal rate and regular rhythm. Pulses:       Dorsalis pedis pulses are 2+ on the right side, and 2+ on the left side. Pulmonary/Chest: Effort normal and breath sounds normal. She has no wheezes. Abdominal: Soft. Bowel sounds are normal. There is no tenderness. Lymphadenopathy:     She has no cervical adenopathy. Neurological: She is alert and oriented to person, place, and time. Reflex Scores:       Patellar reflexes are 2+ on the right side and 2+ on the left side. Skin: She is not diaphoretic. Psychiatric: She has a normal mood and affect. Her behavior is normal.   Nursing note and vitals reviewed. ASSESSMENT and PLAN    ICD-10-CM ICD-9-CM    1. Physical exam V91.08 B62.3 METABOLIC PANEL, COMPREHENSIVE      CBC WITH AUTOMATED DIFF      LIPID PANEL      TSH 3RD GENERATION      VITAMIN D, 25 HYDROXY    Complete physical exam done. Basic labs drawn. I will check her vitamin D.   2. Obesity, morbid ([facility] [address] E66.01 278.01 Followed by weight loss clinic . She takes phentermine 15 when taking Allegra-D and 37.5 mg when not taking Allegra-D. Dr. Lynda Becker has been monitoring it . 3. Chronic fatigue R53.82 780.79 SED RATE (ESR)      CRP, HIGH SENSITIVITY      MARITZA W/REFLEX      LYME AB TOTAL W/RFLX W BLOT    I will check her sed rate and CRP. I will check her for autoimmune conditions and Lyme. 4. Need for vaccine for Td (tetanus-diphtheria) Z23 V06.5 diph,Pertuss,Acell,,Tet Vac-PF (ADACEL) 2 Lf-(2.5-5-3-5 mcg)-5Lf/0.5 mL susp sent to pharmacy. Tdap ordered. 5. B12 deficiency E53.8 266.2 VITAMIN B12    I will check her vitamin B12.  She should take OTC 1,000 mcg vitamin B12.   6. PCOS (polycystic ovarian syndrome) E28.2 256.4 Followed by OB/GYN and compliant on metformin. 7. Thyromegaly E01.0 240.9 US THYROID/PARATHYROID/SOFT TISS    Thyroid US ordered. This plan was reviewed with the patient and patient agrees. All questions were answered. This scribe documentation was reviewed by me and accurately reflects the examination and decisions made by me.

## 2018-09-11 LAB
25(OH)D3+25(OH)D2 SERPL-MCNC: 24.6 NG/ML (ref 30–100)
ALBUMIN SERPL-MCNC: 4.2 G/DL (ref 3.5–5.5)
ALBUMIN/GLOB SERPL: 1.5 {RATIO} (ref 1.2–2.2)
ALP SERPL-CCNC: 70 IU/L (ref 39–117)
ALT SERPL-CCNC: 9 IU/L (ref 0–32)
ANA SER QL: NEGATIVE
AST SERPL-CCNC: 11 IU/L (ref 0–40)
B BURGDOR IGG+IGM SER-ACNC: <0.91 ISR (ref 0–0.9)
BASOPHILS # BLD AUTO: 0.1 X10E3/UL (ref 0–0.2)
BASOPHILS NFR BLD AUTO: 1 %
BILIRUB SERPL-MCNC: 0.2 MG/DL (ref 0–1.2)
BUN SERPL-MCNC: 10 MG/DL (ref 6–20)
BUN/CREAT SERPL: 12 (ref 9–23)
CALCIUM SERPL-MCNC: 9.6 MG/DL (ref 8.7–10.2)
CHLORIDE SERPL-SCNC: 104 MMOL/L (ref 96–106)
CHOLEST SERPL-MCNC: 176 MG/DL (ref 100–199)
CO2 SERPL-SCNC: 24 MMOL/L (ref 20–29)
CREAT SERPL-MCNC: 0.86 MG/DL (ref 0.57–1)
CRP SERPL HS-MCNC: 3.68 MG/L (ref 0–3)
EOSINOPHIL # BLD AUTO: 0.1 X10E3/UL (ref 0–0.4)
EOSINOPHIL NFR BLD AUTO: 1 %
ERYTHROCYTE [DISTWIDTH] IN BLOOD BY AUTOMATED COUNT: 14.1 % (ref 12.3–15.4)
ERYTHROCYTE [SEDIMENTATION RATE] IN BLOOD BY WESTERGREN METHOD: 36 MM/HR (ref 0–32)
GLOBULIN SER CALC-MCNC: 2.8 G/DL (ref 1.5–4.5)
GLUCOSE SERPL-MCNC: 95 MG/DL (ref 65–99)
HCT VFR BLD AUTO: 41.4 % (ref 34–46.6)
HDLC SERPL-MCNC: 44 MG/DL
HGB BLD-MCNC: 13.7 G/DL (ref 11.1–15.9)
IMM GRANULOCYTES # BLD: 0 X10E3/UL (ref 0–0.1)
IMM GRANULOCYTES NFR BLD: 0 %
INTERPRETATION, 910389: NORMAL
LDLC SERPL CALC-MCNC: 104 MG/DL (ref 0–99)
LYMPHOCYTES # BLD AUTO: 2.5 X10E3/UL (ref 0.7–3.1)
LYMPHOCYTES NFR BLD AUTO: 26 %
MCH RBC QN AUTO: 28.2 PG (ref 26.6–33)
MCHC RBC AUTO-ENTMCNC: 33.1 G/DL (ref 31.5–35.7)
MCV RBC AUTO: 85 FL (ref 79–97)
MONOCYTES # BLD AUTO: 0.6 X10E3/UL (ref 0.1–0.9)
MONOCYTES NFR BLD AUTO: 6 %
NEUTROPHILS # BLD AUTO: 6.6 X10E3/UL (ref 1.4–7)
NEUTROPHILS NFR BLD AUTO: 66 %
PLATELET # BLD AUTO: 418 X10E3/UL (ref 150–379)
POTASSIUM SERPL-SCNC: 4.5 MMOL/L (ref 3.5–5.2)
PROT SERPL-MCNC: 7 G/DL (ref 6–8.5)
RBC # BLD AUTO: 4.85 X10E6/UL (ref 3.77–5.28)
SODIUM SERPL-SCNC: 143 MMOL/L (ref 134–144)
TRIGL SERPL-MCNC: 139 MG/DL (ref 0–149)
TSH SERPL DL<=0.005 MIU/L-ACNC: 1.85 UIU/ML (ref 0.45–4.5)
VIT B12 SERPL-MCNC: 254 PG/ML (ref 232–1245)
VLDLC SERPL CALC-MCNC: 28 MG/DL (ref 5–40)
WBC # BLD AUTO: 9.9 X10E3/UL (ref 3.4–10.8)

## 2018-09-11 NOTE — PROGRESS NOTES
Riaz Marion, I left a message on your voice mail as well. Your inflammation markers came back little elevated but B12 is pretty low. VItamin D came low as well. I would suggest getting B12 injection & starting oral B12 1000 mcg daily at the same time. Continue vitamin D 1000 I.u daily dose. Lyme titer & thyroid came back in normal range.

## 2018-09-12 ENCOUNTER — TELEPHONE (OUTPATIENT)
Dept: INTERNAL MEDICINE CLINIC | Age: 33
End: 2018-09-12

## 2018-09-12 ENCOUNTER — HOSPITAL ENCOUNTER (OUTPATIENT)
Dept: ULTRASOUND IMAGING | Age: 33
Discharge: HOME | End: 2018-09-12
Attending: INTERNAL MEDICINE
Payer: COMMERCIAL

## 2018-09-12 DIAGNOSIS — E01.0 THYROMEGALY: ICD-10-CM

## 2018-09-12 PROCEDURE — 76536 US EXAM OF HEAD AND NECK: CPT

## 2018-09-12 NOTE — TELEPHONE ENCOUNTER
Spoke to patient gave results. Advised of B12 and Vitamin D. Patient states she still has fatigue,joint pain and fevers and wants to know what is the next plan? And why she has low b12?

## 2018-09-13 NOTE — PROGRESS NOTES
Lucien Chaudhry, your thyroid ultrasound showed 2 small nodules less than 1 cm. I would suggest follow up ultrasound in 6 months to make sure it`s not getting bigger in size.

## 2018-09-18 ENCOUNTER — CLINICAL SUPPORT (OUTPATIENT)
Dept: PRIMARY CARE CLINIC | Age: 33
End: 2018-09-18

## 2018-09-18 DIAGNOSIS — E53.8 VITAMIN B 12 DEFICIENCY: Primary | ICD-10-CM

## 2018-09-18 RX ORDER — CYANOCOBALAMIN 1000 UG/ML
1000 INJECTION, SOLUTION INTRAMUSCULAR; SUBCUTANEOUS ONCE
Qty: 1 ML | Refills: 0
Start: 2018-09-18 | End: 2018-09-18

## 2018-09-18 NOTE — PROGRESS NOTES
Chief Complaint   Patient presents with    Injection B12     patient present for b 12 injection, received in the right deltoid and well tolerated.

## 2018-10-02 ENCOUNTER — OFFICE VISIT (OUTPATIENT)
Dept: PRIMARY CARE CLINIC | Age: 33
End: 2018-10-02

## 2018-10-02 VITALS
TEMPERATURE: 98.4 F | HEIGHT: 65 IN | HEART RATE: 86 BPM | RESPIRATION RATE: 18 BRPM | SYSTOLIC BLOOD PRESSURE: 120 MMHG | BODY MASS INDEX: 41.72 KG/M2 | DIASTOLIC BLOOD PRESSURE: 86 MMHG | WEIGHT: 250.4 LBS

## 2018-10-02 DIAGNOSIS — M19.90 ARTHRITIS: ICD-10-CM

## 2018-10-02 DIAGNOSIS — L29.0 ANAL PRURITUS: Primary | ICD-10-CM

## 2018-10-02 DIAGNOSIS — E66.01 OBESITY, MORBID (HCC): ICD-10-CM

## 2018-10-02 DIAGNOSIS — E04.1 THYROID NODULE: ICD-10-CM

## 2018-10-02 DIAGNOSIS — E53.8 B12 DEFICIENCY: ICD-10-CM

## 2018-10-02 RX ORDER — GLUCOSAMINE SULFATE 1500 MG
POWDER IN PACKET (EA) ORAL DAILY
COMMUNITY
End: 2021-12-22

## 2018-10-02 RX ORDER — LANOLIN ALCOHOL/MO/W.PET/CERES
1000 CREAM (GRAM) TOPICAL DAILY
COMMUNITY
End: 2021-12-22

## 2018-10-02 RX ORDER — CYANOCOBALAMIN 1000 UG/ML
1000 INJECTION, SOLUTION INTRAMUSCULAR; SUBCUTANEOUS ONCE
Qty: 1 ML | Refills: 0
Start: 2018-10-02 | End: 2018-10-02

## 2018-10-02 NOTE — PROGRESS NOTES
Visit Vitals    /86 (BP 1 Location: Left arm, BP Patient Position: Sitting)    Pulse 86    Temp 98.4 °F (36.9 °C) (Oral)    Resp 18    Ht 5' 5\" (1.651 m)    Wt 250 lb 6.4 oz (113.6 kg)    BMI 41.67 kg/m2     Chief Complaint   Patient presents with    Follow-up     F/U B12, Sleep Issues and Patient states she thinks she has pinworms, c/o rectal itching      Patient adds the following concerns:  1. Progressive Joint Pain  2. Rectal itching  3. Concerned she may have a parasite, or pin worms (states she had in childhood)  4. Spinal Pain ( areas of pain vary) describes as, \"spinal stiffness. \"  5. Supplements       1. Have you been to the ER, urgent care clinic since your last visit? Hospitalized since your last visit? Denies     2. Have you seen or consulted any other health care providers outside of the [address] [place] [address] since your last visit? Include any pap smears or colon screening.  Denies

## 2018-10-02 NOTE — PROGRESS NOTES
Written by Adalid Motta, as dictated by Dr. Padmaja Santizo MD.    Liliana Silverio is a 28 y.o. female. HPI  The patient presents today for a follow-up. She believes that she has been sleeping alright, and notes that she is tracking her sleep with an adrian on her phone. She notes that her data is skewed because she believes the adrian is picking up her boyfriend's snoring. She reports that some days she is at 90%. The patient is still feeling fatigued and experiencing some joint pain. She is compliant on metformin 1,000 mg and taking OTC vitamin B12 and vitamin D. She is walking and swimming and notes that her joint pain has made it difficult for her to exercise. The patient notes that she followed a plant-based diet for 2 weeks which did not relieve her joint pain. The patient notes that she is still experiencing intermittent fevers and diaphoresis. She would like to know if it is possible that she has a tapeworm, as she eats a lot of pork and is concerned that the tapeworm is causing pernicious anemia. She believes she might have pin worms, but she denies travelling recently. She believes that she saw the worms. The patient brought in a picture of what she believes are the pin worms. Followed by weight loss clinic and taking phentermine. The patient reports that she eats shrimp 2-3 days per week, pork 1-2 days per week. She notes that she eats sushi at least once weekly, fish 2-3 times weekly. She eats red meat once weekly and chicken or turkey 1-2 times weekly. She is following a high protein diet and notes that she eats eggs daily. She weighs 250 lbs today and has lost weight from 256 lbs in 05/2018. Followed by Dr. Lillian Diaz and she has an upcoming appointment with endocrinology to discuss her thyroid nodules and fhx of thyroid issues. She had a pap smear on 09/14/2018.     Patient Active Problem List   Diagnosis Code    Anxiety disorder F41.9    Allergic asthma J45.909  Chronic tension-type headache, not intractable G44.229    Obesity, morbid (HCC) E66.01        Current Outpatient Prescriptions on File Prior to Visit   Medication Sig Dispense Refill    metFORMIN (GLUMETZA) 1,000 mg TG24 24 hour tablet Take  by mouth.  LORazepam (ATIVAN) 0.5 mg tablet Take  by mouth.  fluticasone (FLONASE) 50 mcg/actuation nasal spray 2 Sprays by Both Nostrils route daily. 1 Bottle 0    buPROPion XL (WELLBUTRIN XL) 300 mg XL tablet TAKE 1 TABLET BY MOUTH ONCE A DAY  2    topiramate (TOPAMAX) 25 mg tablet Take 1 Tab by mouth two (2) times daily (with meals). 180 Tab 1    fluticasone (FLONASE) 50 mcg/actuation nasal spray 2 Sprays by Both Nostrils route daily.  fexofenadine (ALLEGRA) 180 mg tablet Take 180 mg by mouth daily.  albuterol (PROAIR HFA) 90 mcg/actuation inhaler Take 1 Puff by inhalation every four (4) hours as needed for Wheezing. (Patient taking differently: Take 2 Puffs by inhalation every four (4) hours as needed for Wheezing.) 1 Inhaler 3    phentermine (ADIPEX-P) 37.5 mg tablet TAKE 1/2 TABLET BY MOUTH EVERY MORNING AND1/2 TAB AT 3PM  0     No current facility-administered medications on file prior to visit.         Allergies   Allergen Reactions    Tomato Itching    Amoxicillin Hives    Grass Pollen-Bermuda, Standard Swelling    Iodine Hives    Malt Extract Unknown (comments)    Oak Unknown (comments)    Pcn [Penicillins] Unknown (comments)    Ragweed Unknown (comments)    Shellfish Containing Products Unknown (comments)    Yeast, Dried Unknown (comments)       Past Medical History:   Diagnosis Date    Asthma     Depression     Family history of skin cancer     maternal grandfather - melanoma    Headache(784.0)     PCOS (polycystic ovarian syndrome)     PTSD (post-traumatic stress disorder)     Sun-damaged skin     Tanning bed exposure        Past Surgical History:   Procedure Laterality Date    HX TONSIL AND ADENOIDECTOMY      HX TONSILLECTOMY      HX WISDOM TEETH EXTRACTION         Family History   Problem Relation Age of Onset    Psychiatric Disorder Father     Cancer Maternal Aunt     Heart Disease Maternal Grandfather     Hypertension Maternal Grandfather     Cancer Paternal Grandmother     Diabetes Paternal Grandmother     Hypertension Paternal Grandmother        Social History     Social History    Marital status: SINGLE     Spouse name: N/A    Number of children: N/A    Years of education: N/A     Occupational History    Not on file.      Social History Main Topics    Smoking status: Former Smoker    Smokeless tobacco: Never Used    Alcohol use 1.0 oz/week     2 Glasses of wine per week    Drug use: No    Sexual activity: Yes     Partners: Male     Other Topics Concern    Not on file     Social History Narrative    ** Merged History Encounter **            Office Visit on 09/10/2018   Component Date Value Ref Range Status    Sed rate (ESR) 09/10/2018 36* 0 - 32 mm/hr Final    C-Reactive Protein, Cardiac 09/10/2018 3.68* 0.00 - 3.00 mg/L Final    Antinuclear Antibodies Direct 09/10/2018 Negative  Negative Final    Glucose 09/10/2018 95  65 - 99 mg/dL Final    BUN 09/10/2018 10  6 - 20 mg/dL Final    Creatinine 09/10/2018 0.86  0.57 - 1.00 mg/dL Final    GFR est non-AA 09/10/2018 90  >59 mL/min/1.73 Final    GFR est AA 09/10/2018 103  >59 mL/min/1.73 Final    BUN/Creatinine ratio 09/10/2018 12  9 - 23 Final    Sodium 09/10/2018 143  134 - 144 mmol/L Final    Potassium 09/10/2018 4.5  3.5 - 5.2 mmol/L Final    Chloride 09/10/2018 104  96 - 106 mmol/L Final    CO2 09/10/2018 24  20 - 29 mmol/L Final    Calcium 09/10/2018 9.6  8.7 - 10.2 mg/dL Final    Protein, total 09/10/2018 7.0  6.0 - 8.5 g/dL Final    Albumin 09/10/2018 4.2  3.5 - 5.5 g/dL Final    GLOBULIN, TOTAL 09/10/2018 2.8  1.5 - 4.5 g/dL Final    A-G Ratio 09/10/2018 1.5  1.2 - 2.2 Final    Bilirubin, total 09/10/2018 0.2  0.0 - 1.2 mg/dL Final    Alk. phosphatase 09/10/2018 70  39 - 117 IU/L Final    AST (SGOT) 09/10/2018 11  0 - 40 IU/L Final    ALT (SGPT) 09/10/2018 9  0 - 32 IU/L Final    WBC 09/10/2018 9.9  3.4 - 10.8 x10E3/uL Final    RBC 09/10/2018 4.85  3.77 - 5.28 x10E6/uL Final    HGB 09/10/2018 13.7  11.1 - 15.9 g/dL Final    HCT 09/10/2018 41.4  34.0 - 46.6 % Final    MCV 09/10/2018 85  79 - 97 fL Final    MCH 09/10/2018 28.2  26.6 - 33.0 pg Final    MCHC 09/10/2018 33.1  31.5 - 35.7 g/dL Final    RDW 09/10/2018 14.1  12.3 - 15.4 % Final    PLATELET 71/41/9026 300* 150 - 379 x10E3/uL Final    NEUTROPHILS 09/10/2018 66  Not Estab. % Final    Lymphocytes 09/10/2018 26  Not Estab. % Final    MONOCYTES 09/10/2018 6  Not Estab. % Final    EOSINOPHILS 09/10/2018 1  Not Estab. % Final    BASOPHILS 09/10/2018 1  Not Estab. % Final    ABS. NEUTROPHILS 09/10/2018 6.6  1.4 - 7.0 x10E3/uL Final    Abs Lymphocytes 09/10/2018 2.5  0.7 - 3.1 x10E3/uL Final    ABS. MONOCYTES 09/10/2018 0.6  0.1 - 0.9 x10E3/uL Final    ABS. EOSINOPHILS 09/10/2018 0.1  0.0 - 0.4 x10E3/uL Final    ABS. BASOPHILS 09/10/2018 0.1  0.0 - 0.2 x10E3/uL Final    IMMATURE GRANULOCYTES 09/10/2018 0  Not Estab. % Final    ABS. IMM.  GRANS. 09/10/2018 0.0  0.0 - 0.1 x10E3/uL Final    Cholesterol, total 09/10/2018 176  100 - 199 mg/dL Final    Triglyceride 09/10/2018 139  0 - 149 mg/dL Final    HDL Cholesterol 09/10/2018 44  >39 mg/dL Final    VLDL, calculated 09/10/2018 28  5 - 40 mg/dL Final    LDL, calculated 09/10/2018 104* 0 - 99 mg/dL Final    TSH 09/10/2018 1.850  0.450 - 4.500 uIU/mL Final    VITAMIN D, 25-HYDROXY 09/10/2018 24.6* 30.0 - 100.0 ng/mL Final    Vitamin B12 09/10/2018 254  232 - 1245 pg/mL Final    Lyme Ab, IgG/IgM 09/10/2018 <0.91  0.00 - 0.90 ISR Final   Office Visit on 08/28/2018   Component Date Value Ref Range Status    Lyme Disease Ab, IgM, QT 08/28/2018 <0.80  0.00 - 0.79 index Final       Review of Systems   Constitutional: Positive for diaphoresis, fever and malaise/fatigue. HENT: Negative for congestion. Eyes: Negative for blurred vision and pain. Respiratory: Negative for cough and shortness of breath. Cardiovascular: Negative for chest pain and palpitations. Gastrointestinal: Negative for abdominal pain and heartburn. Genitourinary: Negative for frequency and urgency. Musculoskeletal: Positive for joint pain. Negative for myalgias. Neurological: Negative for dizziness, tingling, sensory change, weakness and headaches. Psychiatric/Behavioral: Negative for depression, memory loss and substance abuse. The patient has insomnia. Visit Vitals    /86 (BP 1 Location: Left arm, BP Patient Position: Sitting)    Pulse 86    Temp 98.4 °F (36.9 °C) (Oral)    Resp 18    Ht 5' 5\" (1.651 m)    Wt 250 lb 6.4 oz (113.6 kg)    LMP 09/02/2018    BMI 41.67 kg/m2       Physical Exam   Constitutional: She is oriented to person, place, and time. She appears well-developed. No distress. Morbidly obese   HENT:   Right Ear: External ear normal.   Left Ear: External ear normal.   Eyes: Conjunctivae and EOM are normal. Right eye exhibits no discharge. Left eye exhibits no discharge. Neck: Normal range of motion. Neck supple. Thyroid nodule   Cardiovascular: Normal rate and regular rhythm. Pulmonary/Chest: Effort normal and breath sounds normal. She has no wheezes. Abdominal: Soft. Bowel sounds are normal. There is no tenderness. Lymphadenopathy:     She has no cervical adenopathy. Neurological: She is alert and oriented to person, place, and time. Skin: She is not diaphoretic. Psychiatric: She has a normal mood and affect. Her behavior is normal.   Nursing note and vitals reviewed. ASSESSMENT and PLAN    ICD-10-CM ICD-9-CM    1. Anal pruritus L29.0 698.0 OVA & PARASITES, STOOL    Ova and parasite stool test ordered. 2. B12 deficiency E53.8 266.2 Vitamin B12 injection given in office.  She should continue to take oral vitamin B12.   3. Obesity, morbid ([facility] [address] E66.01 278.01 I recommend the patient try intermittent fasting. Dinner should be eaten around 7-7:30 pm.  The patient should skip breakfast and eat lunch around 11 am-12 pm. I recommend drinking black coffee or lemon water to help get through the day. She should eat chicken and greens for lunch. 4. Arthritis M19.90 716.90 I recommend she discuss following an antiinflammatory diet with Dr. Rajesh Juarez. 5. Thyroid nodule E04.1 241.0 REFERRAL TO ENDOCRINOLOGY    Referred to endocrinology. This plan was reviewed with the patient and patient agrees. All questions were answered. This scribe documentation was reviewed by me and accurately reflects the examination and decisions made by me. This note will not be viewable in [address]

## 2018-10-02 NOTE — PROGRESS NOTES
B12 Injection:  After obtaining consent, and per orders of Dr. Bailey Ojeda , injection of B12 given by Digna Saab LPN. Patient instructed to remain in clinic for 20 minutes afterwards, and to report any adverse reaction to me immediately. Injection placed in R deltoid, no complaints or concerns voiced, tolerated well by patient.      Medication Details:  Cyanocobalamin 100mcg/ml  Single Dose Vial  Distributed By: Amanda Lackey   HPEX:[id number]  Lot #: [id number]  Exp: 10/2019

## 2018-10-02 NOTE — MR AVS SNAPSHOT
[address] [facility] 
[phone] Patient: Noris Zuniga MRN: [id number] PEB:[birth year] Visit Information Date & Time Provider Department Dept. Phone Encounter #  
 10/2/2018  8:30 AM Scot Jerry MD Luis COLEMAN 2. [phone] [id number] Your Appointments 1/30/2019  8:50 AM  
New Patient with Betzy Earl MD  
[facility] Appt Note: NP thyroids self referral 09/14/2018nb [address] [id number] [phone]  
  
   
 [address] [facility] [address] Upcoming Health Maintenance Date Due Pneumococcal 19-64 Medium Risk (1 of 1 - PPSV23) 11/1/2004 DTaP/Tdap/Td series (1 - Tdap) 11/1/2006 Influenza Age 5 to Adult 8/1/2018 PAP AKA CERVICAL CYTOLOGY 9/14/2021 Allergies as of 10/2/2018  Review Complete On: 10/2/2018 By: Dangelo Sue LPN Severity Noted Reaction Type Reactions Tomato Medium 07/17/2013   Systemic Itching Amoxicillin  12/10/2012    Hives Grass Pollen-bermuda, Standard  03/11/2011    Swelling Iodine  09/21/2017    Hives Malt Extract  09/10/2012    Unknown (comments) Oak  09/10/2012    Unknown (comments) Pcn [Penicillins]  02/21/2011    Unknown (comments) Ragweed  09/10/2012    Unknown (comments) Shellfish Containing Products  09/10/2012    Unknown (comments) Yeast, Dried  09/10/2012    Unknown (comments) Current Immunizations  Reviewed on 10/2/2018 Name Date Tdap 9/10/2018 Reviewed by Scot Jerry MD on 10/2/2018 at  8:48 AM  
You Were Diagnosed With   
  
 Codes Comments Anal pruritus    -  Primary ICD-10-CM: L29.0 ICD-9-CM: 698.0 B12 deficiency     ICD-10-CM: E53.8 ICD-9-CM: 266.2 Obesity, morbid ([facility] [address]     ICD-10-CM: E66.01 
ICD-9-CM: 278.01  Arthritis     ICD-10-CM: M19.90 
 ICD-9-CM: 716.90 Thyroid nodule     ICD-10-CM: E04.1 ICD-9-CM: 241.0 Vitals BP Pulse Temp Resp Height(growth percentile) Weight(growth percentile) 120/86 (BP 1 Location: Left arm, BP Patient Position: Sitting) 86 98.4 °F (36.9 °C) (Oral) 18 5' 5\" (1.651 m) 250 lb 6.4 oz (113.6 kg) LMP BMI OB Status Smoking Status 09/02/2018 41.67 kg/m2 Having regular periods Former Smoker BMI and BSA Data Body Mass Index Body Surface Area  
 41.67 kg/m 2 2.28 m 2 Preferred Pharmacy Pharmacy Name Phone [id number] Kyree Roque. [phone] Your Updated Medication List  
  
   
This list is accurate as of 10/2/18  9:21 AM.  Always use your most recent med list.  
  
  
  
  
 albuterol 90 mcg/actuation inhaler Commonly known as:  PROAIR HFA Take 1 Puff by inhalation every four (4) hours as needed for Wheezing. ALLEGRA 180 mg tablet Generic drug:  fexofenadine Take 180 mg by mouth daily. buPROPion  mg XL tablet Commonly known as:  WELLBUTRIN XL  
TAKE 1 TABLET BY MOUTH ONCE A DAY  
  
 * FLONASE 50 mcg/actuation nasal spray Generic drug:  fluticasone 2 Sprays by Both Nostrils route daily. * fluticasone 50 mcg/actuation nasal spray Commonly known as:  Best Bores 2 Sprays by Both Nostrils route daily. LORazepam 0.5 mg tablet Commonly known as:  ATIVAN Take  by mouth.  
  
 metFORMIN 1,000 mg Tg24 24 hour tablet Commonly known as:  Mary [employer] Take  by mouth.  
  
 phentermine 37.5 mg tablet Commonly known as:  ADIPEX-P  
TAKE 1/2 TABLET BY MOUTH EVERY MORNING AND1/2 TAB AT 3PM  
  
 topiramate 25 mg tablet Commonly known as:  TOPAMAX Take 1 Tab by mouth two (2) times daily (with meals). VITAMIN B-12 1,000 mcg tablet Generic drug:  cyanocobalamin Take 1,000 mcg by mouth daily. VITAMIN D3 1,000 unit Cap Generic drug:  cholecalciferol Take  by mouth daily. * Notice: This list has 2 medication(s) that are the same as other medications prescribed for you. Read the directions carefully, and ask your doctor or other care provider to review them with you. We Performed the Following OVA & PARASITES, STOOL [id number] CPT(R)] REFERRAL TO ENDOCRINOLOGY [QHY88 Custom] Referral Information Referral ID Referred By Referred To  
  
 [id number] Jolie VIEYRA MD   
   [address] [facility] Karen Lai  Phone: [phone] Fax: [phone] Visits Status Start Date End Date 1 New Request 10/2/18 10/2/19 If your referral has a status of pending review or denied, additional information will be sent to support the outcome of this decision. Introducing [facility]! Dear Nader Edwards: 
Thank you for requesting a [employer] account. Our records indicate that you already have an active [employer] account. You can access your account anytime at [email or web address] Did you know that you can access your hospital and ER discharge instructions at any time in [employer]? You can also review all of your test results from your hospital stay or ER visit. Additional Information If you have questions, please visit the Frequently Asked Questions section of the [employer] website at [email or web address] Remember, [employer] is NOT to be used for urgent needs. For medical emergencies, dial 911. Now available from your iPhone and Android! Please provide this summary of care documentation to your next provider. Your primary care clinician is listed as Shyam Phillips. If you have any questions after today's visit, please call [phone].

## 2018-10-10 LAB — O+P SPEC MICRO: NORMAL

## 2018-10-16 ENCOUNTER — CLINICAL SUPPORT (OUTPATIENT)
Dept: PRIMARY CARE CLINIC | Age: 33
End: 2018-10-16

## 2018-10-16 DIAGNOSIS — E53.8 B12 DEFICIENCY: Primary | ICD-10-CM

## 2018-10-16 RX ORDER — CYANOCOBALAMIN 1000 UG/ML
1000 INJECTION, SOLUTION INTRAMUSCULAR; SUBCUTANEOUS ONCE
Qty: 1 ML | Refills: 0
Start: 2018-10-16 | End: 2018-10-16

## 2018-10-16 NOTE — PROGRESS NOTES
After obtaining consent, and per orders of Dr. Celina Luna, injection of B12 given by Jamshid Lemus LPN. Patient instructed to remain in clinic for 20 minutes afterwards, and to report any adverse reaction to me immediately.

## 2018-10-19 ENCOUNTER — TELEPHONE (OUTPATIENT)
Dept: PRIMARY CARE CLINIC | Age: 33
End: 2018-10-19

## 2018-10-19 NOTE — TELEPHONE ENCOUNTER
Pt is going to be working at [employer] for [employer] and would like to know if she can get b12's there instead of driving out here to get them. If not would like to know if she can get them done at [address] since she lives near there.

## 2018-10-22 NOTE — TELEPHONE ENCOUNTER
----- Message from Best Jules sent at 10/20/2018 11:08 AM EDT -----  Regarding: /Telephone  Pt returned the call to office.       Best [phone]

## 2018-10-22 NOTE — TELEPHONE ENCOUNTER
Spoke to patient let her know she would need to contact [facility] and see if they can give b12.

## 2018-11-28 ENCOUNTER — OFFICE VISIT (OUTPATIENT)
Dept: PRIMARY CARE CLINIC | Age: 33
End: 2018-11-28

## 2018-11-28 VITALS
WEIGHT: 253 LBS | DIASTOLIC BLOOD PRESSURE: 82 MMHG | SYSTOLIC BLOOD PRESSURE: 115 MMHG | HEART RATE: 101 BPM | OXYGEN SATURATION: 99 % | TEMPERATURE: 98.3 F | HEIGHT: 65 IN | BODY MASS INDEX: 42.15 KG/M2 | RESPIRATION RATE: 16 BRPM

## 2018-11-28 DIAGNOSIS — E66.01 MORBIDLY OBESE (HCC): ICD-10-CM

## 2018-11-28 DIAGNOSIS — E53.8 VITAMIN B 12 DEFICIENCY: ICD-10-CM

## 2018-11-28 DIAGNOSIS — G43.009 MIGRAINE WITHOUT AURA AND WITHOUT STATUS MIGRAINOSUS, NOT INTRACTABLE: Primary | ICD-10-CM

## 2018-11-28 DIAGNOSIS — Z87.42 HISTORY OF PCOS: ICD-10-CM

## 2018-11-28 DIAGNOSIS — E55.9 VITAMIN D DEFICIENCY: ICD-10-CM

## 2018-11-28 RX ORDER — CYANOCOBALAMIN 1000 UG/ML
1000 INJECTION, SOLUTION INTRAMUSCULAR; SUBCUTANEOUS ONCE
Qty: 1 ML | Refills: 0
Start: 2018-11-28 | End: 2018-11-28

## 2018-11-28 RX ORDER — METFORMIN HYDROCHLORIDE 750 MG/1
750 TABLET, EXTENDED RELEASE ORAL DAILY
Qty: 30 TAB | Refills: 0 | COMMUNITY
Start: 2018-11-28 | End: 2019-07-29 | Stop reason: SDUPTHER

## 2018-11-28 NOTE — PROGRESS NOTES
Written by Ronn Bassett, as dictated by Dr. Clifm Snellen, MD.    Eddie Carmen is a 35 y.o. female. HPI  The patient presents today for a vitamin B12 follow-up. Patient notes that she is feeling much better after receiving 3 vitamin B12 injections. She is also taking OTC oral vitamin B12. She weighs 253 lbs today and gained weight from 250 lbs on 10/02. Patient notes that since receiving treatment for low vitamin B12 her energy has improved and she feels that weight loss is easier. Followed by Dr. Angela Devries (endocrinology) who she will see later today. Patient is only taking 1 tablet 500 mg metformin once daily, which she is taking for PCOS. The patient notes that she will be seeing another endocrinologist to manage her PCOS. She is not taking phentermine at this time. The patient notes that her cravings have improved on Wellbutrin. Compliant on Wellbutrin  mg and doing well. She is taking 2 tablets Topamax 25 mg at once. The patient notes that her headaches have improved with Topamax as she only had 2-3 this month. She notes that she typically has migraines around her period, which she attributes to hormones. Denies mental fogginess on Topamax. Patient is hesitant to increase her Topamax dosage as she does not want to experience side effects. Followed by psychiatry, who is prescribing Wellbutrin and Topamax. Pulse is high today at 101, but patient notes that she just had coffee. Compliant on OTC vitamin D 1,000 iu. Patient refuses flu shot today. Patient Active Problem List   Diagnosis Code    Anxiety disorder F41.9    Allergic asthma J45.909    Chronic tension-type headache, not intractable G44.229    Obesity, morbid ([facility] [address] E66.01        Current Outpatient Medications on File Prior to Visit   Medication Sig Dispense Refill    metFORMIN ER (GLUCOPHAGE XR) 750 mg tablet Take 1 Tab by mouth daily.  30 Tab 0    cyanocobalamin (VITAMIN B-12) 1,000 mcg tablet Take 1,000 mcg by mouth daily.  cholecalciferol (VITAMIN D3) 1,000 unit cap Take  by mouth daily.  LORazepam (ATIVAN) 0.5 mg tablet Take  by mouth.  fluticasone (FLONASE) 50 mcg/actuation nasal spray 2 Sprays by Both Nostrils route daily. 1 Bottle 0    buPROPion XL (WELLBUTRIN XL) 300 mg XL tablet TAKE 1 TABLET BY MOUTH ONCE A DAY  2    fluticasone (FLONASE) 50 mcg/actuation nasal spray 2 Sprays by Both Nostrils route daily.  fexofenadine (ALLEGRA) 180 mg tablet Take 180 mg by mouth daily.  albuterol (PROAIR HFA) 90 mcg/actuation inhaler Take 1 Puff by inhalation every four (4) hours as needed for Wheezing. (Patient taking differently: Take 2 Puffs by inhalation every four (4) hours as needed for Wheezing.) 1 Inhaler 3     No current facility-administered medications on file prior to visit.         Allergies   Allergen Reactions    Tomato Itching    Amoxicillin Hives    Grass Pollen-[place], Standard Swelling    Iodine Hives    Malt Extract Unknown (comments)    Oak Unknown (comments)    Pcn [Penicillins] Unknown (comments)    Ragweed Unknown (comments)    Shellfish Containing Products Unknown (comments)    Yeast, Dried Unknown (comments)       Past Medical History:   Diagnosis Date    Asthma     Depression     Family history of skin cancer     maternal grandfather - melanoma    Headache(784.0)     PCOS (polycystic ovarian syndrome)     PTSD (post-traumatic stress disorder)     Sun-damaged skin     Tanning bed exposure        Past Surgical History:   Procedure Laterality Date    HX TONSIL AND ADENOIDECTOMY      HX TONSILLECTOMY      HX WISDOM TEETH EXTRACTION         Family History   Problem Relation Age of Onset    Psychiatric Disorder Father     Cancer Maternal Aunt     Heart Disease Maternal Grandfather     Hypertension Maternal Grandfather     Cancer Paternal Grandmother     Diabetes Paternal Grandmother     Hypertension Paternal Grandmother Social History     Socioeconomic History    Marital status: SINGLE     Spouse name: Not on file    Number of children: Not on file    Years of education: Not on file    Highest education level: Not on file   Social Needs    Financial resource strain: Not on file    Food insecurity - worry: Not on file    Food insecurity - inability: Not on file    Transportation needs - medical: Not on file   [employer] needs - non-medical: Not on file   Occupational History    Not on file   Tobacco Use    Smoking status: Former Smoker    Smokeless tobacco: Never Used   Substance and Sexual Activity    Alcohol use: Yes     Alcohol/week: 1.0 oz     Types: 2 Glasses of wine per week    Drug use: No    Sexual activity: Yes     Partners: Male   Other Topics Concern    Not on file   Social History Narrative    ** Merged History Encounter **            Office Visit on 10/02/2018   Component Date Value Ref Range Status    Ova & Parasite exam 10/02/2018    Final                    Value:No ova, cysts, or parasites seen. .  One negative specimen does not rule out the possibility of a  parasitic infection.      Office Visit on 09/10/2018   Component Date Value Ref Range Status    Sed rate (ESR) 09/10/2018 36* 0 - 32 mm/hr Final    C-Reactive Protein, Cardiac 09/10/2018 3.68* 0.00 - 3.00 mg/L Final    Antinuclear Antibodies Direct 09/10/2018 Negative  Negative Final    Glucose 09/10/2018 95  65 - 99 mg/dL Final    BUN 09/10/2018 10  6 - 20 mg/dL Final    Creatinine 09/10/2018 0.86  0.57 - 1.00 mg/dL Final    GFR est non-AA 09/10/2018 90  >59 mL/min/1.73 Final    GFR est AA 09/10/2018 103  >59 mL/min/1.73 Final    BUN/Creatinine ratio 09/10/2018 12  9 - 23 Final    Sodium 09/10/2018 143  134 - 144 mmol/L Final    Potassium 09/10/2018 4.5  3.5 - 5.2 mmol/L Final    Chloride 09/10/2018 104  96 - 106 mmol/L Final    CO2 09/10/2018 24  20 - 29 mmol/L Final    Calcium 09/10/2018 9.6  8.7 - 10.2 mg/dL Final    Protein, total 09/10/2018 7.0  6.0 - 8.5 g/dL Final    Albumin 09/10/2018 4.2  3.5 - 5.5 g/dL Final    GLOBULIN, TOTAL 09/10/2018 2.8  1.5 - 4.5 g/dL Final    A-G Ratio 09/10/2018 1.5  1.2 - 2.2 Final    Bilirubin, total 09/10/2018 0.2  0.0 - 1.2 mg/dL Final    Alk. phosphatase 09/10/2018 70  39 - 117 IU/L Final    AST (SGOT) 09/10/2018 11  0 - 40 IU/L Final    ALT (SGPT) 09/10/2018 9  0 - 32 IU/L Final    WBC 09/10/2018 9.9  3.4 - 10.8 x10E3/uL Final    RBC 09/10/2018 4.85  3.77 - 5.28 x10E6/uL Final    HGB 09/10/2018 13.7  11.1 - 15.9 g/dL Final    HCT 09/10/2018 41.4  34.0 - 46.6 % Final    MCV 09/10/2018 85  79 - 97 fL Final    MCH 09/10/2018 28.2  26.6 - 33.0 pg Final    MCHC 09/10/2018 33.1  31.5 - 35.7 g/dL Final    RDW 09/10/2018 14.1  12.3 - 15.4 % Final    PLATELET 01/17/4887 868* 150 - 379 x10E3/uL Final    NEUTROPHILS 09/10/2018 66  Not Estab. % Final    Lymphocytes 09/10/2018 26  Not Estab. % Final    MONOCYTES 09/10/2018 6  Not Estab. % Final    EOSINOPHILS 09/10/2018 1  Not Estab. % Final    BASOPHILS 09/10/2018 1  Not Estab. % Final    ABS. NEUTROPHILS 09/10/2018 6.6  1.4 - 7.0 x10E3/uL Final    Abs Lymphocytes 09/10/2018 2.5  0.7 - 3.1 x10E3/uL Final    ABS. MONOCYTES 09/10/2018 0.6  0.1 - 0.9 x10E3/uL Final    ABS. EOSINOPHILS 09/10/2018 0.1  0.0 - 0.4 x10E3/uL Final    ABS. BASOPHILS 09/10/2018 0.1  0.0 - 0.2 x10E3/uL Final    IMMATURE GRANULOCYTES 09/10/2018 0  Not Estab. % Final    ABS. IMM.  GRANS. 09/10/2018 0.0  0.0 - 0.1 x10E3/uL Final    Cholesterol, total 09/10/2018 176  100 - 199 mg/dL Final    Triglyceride 09/10/2018 139  0 - 149 mg/dL Final    HDL Cholesterol 09/10/2018 44  >39 mg/dL Final    VLDL, calculated 09/10/2018 28  5 - 40 mg/dL Final    LDL, calculated 09/10/2018 104* 0 - 99 mg/dL Final    TSH 09/10/2018 1.850  0.450 - 4.500 uIU/mL Final    VITAMIN D, 25-HYDROXY 09/10/2018 24.6* 30.0 - 100.0 ng/mL Final    Vitamin B12 09/10/2018 254  232 - 1,245 pg/mL Final    Lyme Ab, IgG/IgM 09/10/2018 <0.91  0.00 - 0.90 ISR Final       Review of Systems   Constitutional: Negative for malaise/fatigue. HENT: Negative for congestion. Eyes: Negative for blurred vision and pain. Respiratory: Negative for cough and shortness of breath. Cardiovascular: Negative for chest pain and palpitations. Gastrointestinal: Negative for abdominal pain and heartburn. Genitourinary: Negative for frequency and urgency. Musculoskeletal: Negative for joint pain and myalgias. Neurological: Positive for headaches. Negative for dizziness, tingling, sensory change and weakness. Psychiatric/Behavioral: Negative for depression, memory loss and substance abuse. Visit Vitals  /82 (BP 1 Location: Left arm, BP Patient Position: Sitting)   Pulse (!) 101   Temp 98.3 °F (36.8 °C) (Oral)   Resp 16   Ht 5' 5\" (1.651 m)   Wt 253 lb (114.8 kg)   LMP 11/07/2018   SpO2 99%   BMI 42.10 kg/m²       Physical Exam   Constitutional: She is oriented to person, place, and time. She appears well-developed. No distress. Morbidly obese   HENT:   Right Ear: External ear normal.   Left Ear: External ear normal.   Eyes: Conjunctivae and EOM are normal. Right eye exhibits no discharge. Left eye exhibits no discharge. Neck: Normal range of motion. Neck supple. Cardiovascular: Normal rate and regular rhythm. Pulmonary/Chest: Effort normal and breath sounds normal. She has no wheezes. Abdominal: Soft. Bowel sounds are normal. There is no tenderness. Lymphadenopathy:     She has no cervical adenopathy. Neurological: She is alert and oriented to person, place, and time. Skin: She is not diaphoretic. Psychiatric: She has a normal mood and affect. Her behavior is normal.   Nursing note and vitals reviewed. ASSESSMENT and PLAN    ICD-10-CM ICD-9-CM    1.  Migraine without aura and without status migrainosus, not intractable G43.009 346.10 topiramate ER (TROKENDI XR) 100 mg capsule sent to pharmacy. Trokendi  mg prescribed and she should stop taking Topamax. Potential side effects were discussed. She should talk to her psychiatry and I would like her to continue Trokendi if it works for her as Trokendi has fewer side effects. Coupon card for Trokendi given to patient. 2. Vitamin B 12 deficiency E53.8 266.2 VITAMIN B12    Vitamin B12 ordered. Vitamin B12 injection given after patient had labs drawn. Patient should continue taking OTC oral vitamin B12.   3. Vitamin D deficiency E55.9 268.9 VITAMIN D, 25 HYDROXY    Vitamin D ordered. Patient should continue taking OTC 1,000 iu vitamin D.   4. Morbidly obese ([address] E66.01 278.01 Commended on her efforts. Followed by Dr. Alee Olivia (endocrinology). 5. History of PCOS Z87.42 V13.29 metFORMIN ER (GLUCOPHAGE XR) 750 mg tablet sent to pharmacy. Metformin  mg prescribed. Discussed that increasing her dosage to 750 mg will help with weight loss. Patient will establish with a new endocrinologist who will manage her PCOS. This plan was reviewed with the patient and patient agrees. All questions were answered. This scribe documentation was reviewed by me and accurately reflects the examination and decisions made by me. This note will not be viewable in [address]

## 2018-11-29 LAB
25(OH)D3+25(OH)D2 SERPL-MCNC: 28.6 NG/ML (ref 30–100)
VIT B12 SERPL-MCNC: 708 PG/ML (ref 232–1245)

## 2018-11-30 NOTE — PROGRESS NOTES
Jersey, your B12 came back fine but vitamin D is still on the low side. Please keep taking Vitamin d daily.

## 2019-01-03 ENCOUNTER — HOSPITAL ENCOUNTER (OUTPATIENT)
Dept: ULTRASOUND IMAGING | Age: 34
Discharge: HOME | End: 2019-01-03
Payer: COMMERCIAL

## 2019-01-03 DIAGNOSIS — R10.11 RIGHT UPPER QUADRANT ABDOMINAL PAIN: ICD-10-CM

## 2019-01-03 PROCEDURE — 76705 ECHO EXAM OF ABDOMEN: CPT

## 2019-01-04 ENCOUNTER — OFFICE VISIT (OUTPATIENT)
Dept: PRIMARY CARE CLINIC | Age: 34
End: 2019-01-04

## 2019-01-04 ENCOUNTER — HOSPITAL ENCOUNTER (OUTPATIENT)
Dept: ULTRASOUND IMAGING | Age: 34
Discharge: HOME | End: 2019-01-04
Attending: NURSE PRACTITIONER
Payer: COMMERCIAL

## 2019-01-04 VITALS
TEMPERATURE: 98.4 F | OXYGEN SATURATION: 97 % | RESPIRATION RATE: 16 BRPM | HEART RATE: 94 BPM | BODY MASS INDEX: 43.85 KG/M2 | DIASTOLIC BLOOD PRESSURE: 89 MMHG | SYSTOLIC BLOOD PRESSURE: 121 MMHG | WEIGHT: 263.2 LBS | HEIGHT: 65 IN

## 2019-01-04 DIAGNOSIS — R10.9 RT FLANK PAIN: Primary | ICD-10-CM

## 2019-01-04 DIAGNOSIS — R82.998 DARK URINE: ICD-10-CM

## 2019-01-04 DIAGNOSIS — R10.9 RT FLANK PAIN: ICD-10-CM

## 2019-01-04 LAB
BILIRUB UR QL STRIP: NEGATIVE
GLUCOSE UR-MCNC: NEGATIVE MG/DL
KETONES P FAST UR STRIP-MCNC: NEGATIVE MG/DL
PH UR STRIP: 5 [PH] (ref 4.6–8)
PROT UR QL STRIP: NEGATIVE
SP GR UR STRIP: 1.03 (ref 1–1.03)
UA UROBILINOGEN AMB POC: NORMAL (ref 0.2–1)
URINALYSIS CLARITY POC: NORMAL
URINALYSIS COLOR POC: YELLOW
URINE BLOOD POC: NEGATIVE
URINE LEUKOCYTES POC: NEGATIVE
URINE NITRITES POC: NEGATIVE

## 2019-01-04 PROCEDURE — 76770 US EXAM ABDO BACK WALL COMP: CPT

## 2019-01-04 NOTE — PROGRESS NOTES
Chief Complaint   Patient presents with    Abdominal Pain     patient states that [facility] went to [facility] and received muscle relaxers. states that she has pain in the upper right quad of abdomin. ultrasound done and kidneys checked  neg.  states that the pain is not going away and is getting worse.   denies any problem with urinating but bm is not regular

## 2019-01-04 NOTE — PROGRESS NOTES
[facility]   [address] [facility] [place], [place], [address] [place] [address]  P: [phone]  F: [phone]      Chief Complaint   Patient presents with    Abdominal Pain     patient states that [facility] and [facility] relaxers. states that she has pain in the upper right quad of abdomin. ultrasound done and kidneys checked  neg.  states that the pain is not going away and is getting worse. denies any problem with urinating but bm is not regular       Roxana Blackburn is a 35 y.o. female who presents to clinic for Abdominal Pain (patient states that she [facility] and received muscle relaxers. states that she has pain in the upper right quad of abdomin. ultrasound done and kidneys checked  neg.  states that the pain is not going away and is getting worse. denies any problem with urinating but bm is not regular). HPI:    The patient presents with R sided abdominal/flank pain, going on intermittently for the last few weeks. Three days ago she had severe, stabbing pain to right side. She went to [facility] and had blood work, urine, and had US for gallstones and nothing was found. She went back yesterday for continued pain and it was recommended she get a HIDA scan. She is coming to us today for a second opinion. She notes dark urine, has had constipation for 4 days, first BM today and it was difficult. She thinks this may be d/t narcotic given by [facility]. No urinary symptoms besides change in color, dark urine. She notes menstrual cycles have been normal, happening monthly and denies any lower abdomen pain or cramping.      Patient Active Problem List    Diagnosis    Obesity, morbid (HCC)    Allergic asthma    Chronic tension-type headache, not intractable    Anxiety disorder          Past Medical History:   Diagnosis Date    Asthma     Depression     Family history of skin cancer     maternal grandfather - melanoma    Headache(784.0)     PCOS (polycystic ovarian syndrome)     PTSD (post-traumatic stress disorder)     Sun-damaged skin     Tanning bed exposure      Past Surgical History:   Procedure Laterality Date    HX TONSIL AND ADENOIDECTOMY      HX TONSILLECTOMY      HX WISDOM TEETH EXTRACTION       Social History     Socioeconomic History    Marital status: SINGLE     Spouse name: Not on file    Number of children: Not on file    Years of education: Not on file    Highest education level: Not on file   Social Needs    Financial resource strain: Not on file    Food insecurity - worry: Not on file    Food insecurity - inability: Not on file   [employer] needs - medical: Not on file   [employer] needs - non-medical: Not on file   Occupational History    Not on file   Tobacco Use    Smoking status: Former Smoker    Smokeless tobacco: Never Used   Substance and Sexual Activity    Alcohol use: Yes     Alcohol/week: 1.0 oz     Types: 2 Glasses of wine per week    Drug use: No    Sexual activity: Yes     Partners: Male   Other Topics Concern    Not on file   Social History Narrative    ** Merged History Encounter **          Family History   Problem Relation Age of Onset    Psychiatric Disorder Father     Cancer Maternal Aunt     Heart Disease Maternal Grandfather     Hypertension Maternal Grandfather     Cancer Paternal Grandmother     Diabetes Paternal Grandmother     Hypertension Paternal Grandmother      Allergies   Allergen Reactions    Tomato Itching    Amoxicillin Hives    Grass Pollen-[place], Standard Swelling    Iodine Hives    Malt Extract Unknown (comments)    Oak Unknown (comments)    Pcn [Penicillins] Unknown (comments)    Ragweed Unknown (comments)    Shellfish Containing Products Unknown (comments)    Yeast, Dried Unknown (comments)       Current Outpatient Medications   Medication Sig Dispense Refill    metFORMIN ER (GLUCOPHAGE XR) 750 mg tablet Take 1 Tab by mouth daily.  30 Tab 0    cyanocobalamin (VITAMIN B-12) 1,000 mcg tablet Take 1,000 mcg by mouth daily.  cholecalciferol (VITAMIN D3) 1,000 unit cap Take  by mouth daily.  LORazepam (ATIVAN) 0.5 mg tablet Take  by mouth.  fluticasone (FLONASE) 50 mcg/actuation nasal spray 2 Sprays by Both Nostrils route daily. 1 Bottle 0    buPROPion XL (WELLBUTRIN XL) 300 mg XL tablet TAKE 1 TABLET BY MOUTH ONCE A DAY  2    fluticasone (FLONASE) 50 mcg/actuation nasal spray 2 Sprays by Both Nostrils route daily.  fexofenadine (ALLEGRA) 180 mg tablet Take 180 mg by mouth daily.  albuterol (PROAIR HFA) 90 mcg/actuation inhaler Take 1 Puff by inhalation every four (4) hours as needed for Wheezing. (Patient taking differently: Take 2 Puffs by inhalation every four (4) hours as needed for Wheezing.) 1 Inhaler 3    topiramate ER (TROKENDI XR) 100 mg capsule Take 1 Cap by mouth daily for 90 days. 30 Cap 2           The medications were reviewed and updated in the medical record. The past medical history, past surgical history, and family history were reviewed and updated in the medical record. REVIEW OF SYSTEMS   Review of Systems   Constitutional: Negative for chills, fever and malaise/fatigue. HENT: Negative for congestion. Eyes: Negative for blurred vision and pain. Respiratory: Negative for cough and shortness of breath. Cardiovascular: Negative for chest pain and palpitations. Gastrointestinal: Positive for constipation. Negative for abdominal pain and heartburn. Genitourinary: Positive for flank pain. Negative for dysuria, frequency, hematuria and urgency. Musculoskeletal: Negative for joint pain and myalgias. Neurological: Negative for dizziness, tingling, sensory change, weakness and headaches. Psychiatric/Behavioral: Negative for depression, memory loss and substance abuse.          PHYSICAL EXAM     Visit Vitals  /89 (BP 1 Location: Right arm, BP Patient Position: Sitting)   Pulse 94   Temp 98.4 °F (36.9 °C) (Oral)   Resp 16   Ht 5' 5\" (1.651 m)   Wt 263 lb 3.2 oz (119.4 kg)   LMP 12/07/2018   SpO2 97%   BMI 43.80 kg/m²       Physical Exam   Constitutional: She is oriented to person, place, and time and well-developed, well-nourished, and in no distress. HENT:   Head: Normocephalic and atraumatic. Right Ear: External ear normal.   Left Ear: External ear normal.   Cardiovascular: Normal rate, regular rhythm and normal heart sounds. Pulmonary/Chest: Effort normal and breath sounds normal.   Abdominal: Soft. Normal appearance and bowel sounds are normal. She exhibits no distension and no mass. There is no hepatosplenomegaly. There is CVA tenderness (R sided). Musculoskeletal: Normal range of motion. She exhibits no edema. Neurological: She is alert and oriented to person, place, and time. Gait normal.   Skin: Skin is warm and dry. Psychiatric: Affect and judgment normal.   Nursing note and vitals reviewed. ASSESSMENT/ PLAN   Diagnoses and all orders for this visit:    1. Rt flank pain  -     AMYLASE  -     LIPASE  -     METABOLIC PANEL, COMPREHENSIVE  -     US RETROPERITONEUM COMP; Future  -     US ABD LTD; Future  -     AMB POC URINALYSIS DIP STICK AUTO W/O MICRO  -     CBC W/O DIFF    2. Dark urine  -     AMB POC URINALYSIS DIP STICK AUTO W/O MICRO    Area of pain to R flank, mid- upper back consistent with location of kidney. I will [address] both her kidney and RUQ as pain radiates. If she experiences sharp pain please return to urgent care or ER. Follow-up Disposition:  Return if symptoms worsen or fail to improve, for Flank pain. Disclaimer:  Advised patient to call back or return to office if symptoms worsen/change/persist.  Discussed expected course/resolution/complications of diagnosis in detail with patient.     Medication risks/benefits/alternatives discussed with patient. Patient was given an after visit summary which includes diagnoses, current medications, & vitals.    Discussed patient instructions and advised to read to all patient instructions regarding care.      Patient expressed understanding with the diagnosis and plan. This note will not be viewable in [address]         Hiro Zuñiga NP  1/4/2019        (This document has been electronically signed)

## 2019-01-04 NOTE — PATIENT INSTRUCTIONS
Flank Pain: Care Instructions  Your Care Instructions  Flank pain is pain on the side of the back just below the rib cage and above the waist. It can be on one or both sides. Flank pain has many possible causes, including a kidney stone, a urinary tract infection, or back strain. Flank pain may get better on its own. But don't ignore new symptoms, such as fever, nausea and vomiting, urination problems, pain that gets worse, and dizziness. These may be signs of a more serious problem. You may have to have tests or other treatment. Follow-up care is a key part of your treatment and safety. Be sure to make and go to all appointments, and call your doctor if you are having problems. It's also a good idea to know your test results and keep a list of the medicines you take. How can you care for yourself at home? · Rest until you feel better. · Take pain medicines exactly as directed. ? If the doctor gave you a prescription medicine for pain, take it as prescribed. ? If you are not taking a prescription pain medicine, ask your doctor if you can take an over-the-counter pain medicine, such as acetaminophen (Tylenol), ibuprofen (Advil, Motrin), or naproxen (Aleve). Read and follow all instructions on the label. · If your doctor prescribed antibiotics, take them as directed. Do not stop taking them just because you feel better. You need to take the full course of antibiotics. · To apply heat, put a warm water bottle, a heating pad set on low, or a warm cloth on the painful area. Do not go to sleep with a heating pad on your skin. · To prevent dehydration, drink plenty of fluids, enough so that your urine is light yellow or clear like water. Choose water and other caffeine-free clear liquids until you feel better. If you have kidney, heart, or liver disease and have to limit fluids, talk with your doctor before you increase the amount of fluids you drink. When should you call for help?   Call your doctor now or seek immediate medical care if:    · Your flank pain gets worse.     · You have new symptoms, such as fever, nausea, or vomiting.     · You have symptoms of a urinary problem. For example:  ? You have blood or pus in your urine. ? You have chills or body aches. ? It hurts to urinate. ? You have groin or belly pain.    Watch closely for changes in your health, and be sure to contact your doctor if you do not get better as expected. Where can you learn more? Go to http://marcelle-yvonne.info/. Enter S191 in the search box to learn more about \"Flank Pain: Care Instructions. \"  Current as of: November 20, 2017  Content Version: 11.8  © 4864-9022 [employer]. Care instructions adapted under license by [employer] (which disclaims liability or warranty for this information). If you have questions about a medical condition or this instruction, always ask your healthcare professional. [place] [address] any warranty or liability for your use of this information.

## 2019-01-05 LAB
ALBUMIN SERPL-MCNC: 3.9 G/DL (ref 3.5–5.5)
ALBUMIN/GLOB SERPL: 1.3 {RATIO} (ref 1.2–2.2)
ALP SERPL-CCNC: 52 IU/L (ref 39–117)
ALT SERPL-CCNC: 15 IU/L (ref 0–32)
AMYLASE SERPL-CCNC: 22 U/L (ref 31–124)
AST SERPL-CCNC: 15 IU/L (ref 0–40)
BILIRUB SERPL-MCNC: 0.2 MG/DL (ref 0–1.2)
BUN SERPL-MCNC: 12 MG/DL (ref 6–20)
BUN/CREAT SERPL: 15 (ref 9–23)
CALCIUM SERPL-MCNC: 9.2 MG/DL (ref 8.7–10.2)
CHLORIDE SERPL-SCNC: 102 MMOL/L (ref 96–106)
CO2 SERPL-SCNC: 24 MMOL/L (ref 20–29)
CREAT SERPL-MCNC: 0.8 MG/DL (ref 0.57–1)
ERYTHROCYTE [DISTWIDTH] IN BLOOD BY AUTOMATED COUNT: 13.9 % (ref 12.3–15.4)
GLOBULIN SER CALC-MCNC: 2.9 G/DL (ref 1.5–4.5)
GLUCOSE SERPL-MCNC: 82 MG/DL (ref 65–99)
HCT VFR BLD AUTO: 39.4 % (ref 34–46.6)
HGB BLD-MCNC: 13.1 G/DL (ref 11.1–15.9)
LIPASE SERPL-CCNC: 13 U/L (ref 14–72)
MCH RBC QN AUTO: 29.2 PG (ref 26.6–33)
MCHC RBC AUTO-ENTMCNC: 33.2 G/DL (ref 31.5–35.7)
MCV RBC AUTO: 88 FL (ref 79–97)
PLATELET # BLD AUTO: 338 X10E3/UL (ref 150–379)
POTASSIUM SERPL-SCNC: 4.3 MMOL/L (ref 3.5–5.2)
PROT SERPL-MCNC: 6.8 G/DL (ref 6–8.5)
RBC # BLD AUTO: 4.49 X10E6/UL (ref 3.77–5.28)
SODIUM SERPL-SCNC: 139 MMOL/L (ref 134–144)
WBC # BLD AUTO: 8.9 X10E3/UL (ref 3.4–10.8)

## 2019-01-07 ENCOUNTER — TELEPHONE (OUTPATIENT)
Dept: PRIMARY CARE CLINIC | Age: 34
End: 2019-01-07

## 2019-01-07 DIAGNOSIS — K80.50 BILIARY COLIC: Primary | ICD-10-CM

## 2019-01-07 NOTE — TELEPHONE ENCOUNTER
PT. Would like to know if she still has to go get an ultrasound done and may be reached at [phone]. She would like to know today so that she may cancel it if need be.

## 2019-01-07 NOTE — PROGRESS NOTES
Tyler Fierro,     Your US shows a mildly distended gallbladder but no stones or acute issue. Your lab work otherwise is negative. I will put a GI referral to discuss further management. I do want you to work on a healthy diet as high fat foods can further impact your gallbladder. Please call if you would like to discuss further. Thanks.

## 2019-01-07 NOTE — TELEPHONE ENCOUNTER
Pt returned call for [facility]. Pt has questions.  Please follow up [phone]

## 2019-01-10 ENCOUNTER — OFFICE VISIT (OUTPATIENT)
Dept: PRIMARY CARE CLINIC | Age: 34
End: 2019-01-10

## 2019-01-10 VITALS
SYSTOLIC BLOOD PRESSURE: 138 MMHG | BODY MASS INDEX: 44.25 KG/M2 | HEIGHT: 65 IN | OXYGEN SATURATION: 98 % | DIASTOLIC BLOOD PRESSURE: 88 MMHG | HEART RATE: 64 BPM | WEIGHT: 265.6 LBS | TEMPERATURE: 98.5 F | RESPIRATION RATE: 20 BRPM

## 2019-01-10 DIAGNOSIS — R50.9 FEVER AND CHILLS: ICD-10-CM

## 2019-01-10 DIAGNOSIS — R11.0 NAUSEA: Primary | ICD-10-CM

## 2019-01-10 DIAGNOSIS — R19.7 DIARRHEA, UNSPECIFIED TYPE: ICD-10-CM

## 2019-01-10 DIAGNOSIS — R10.11 RIGHT UPPER QUADRANT PAIN: ICD-10-CM

## 2019-01-10 RX ORDER — ONDANSETRON 8 MG/1
8 TABLET, ORALLY DISINTEGRATING ORAL
Qty: 10 TAB | Refills: 0 | Status: SHIPPED | OUTPATIENT
Start: 2019-01-10 | End: 2021-04-08

## 2019-01-10 RX ORDER — LEVOFLOXACIN 750 MG/1
750 TABLET ORAL DAILY
Qty: 10 TAB | Refills: 0 | Status: SHIPPED | OUTPATIENT
Start: 2019-01-10 | End: 2019-01-20

## 2019-01-10 NOTE — PROGRESS NOTES
Visit Vitals  /88 (BP 1 Location: Right arm, BP Patient Position: Sitting)   Pulse 64   Temp 98.5 °F (36.9 °C) (Oral)   Resp 20   Ht 5' 5\" (1.651 m)   Wt 265 lb 9.6 oz (120.5 kg)   SpO2 98%   BMI 44.20 kg/m²     Chief Complaint   Patient presents with    Chills     In the last 24 hours    Flank Pain     Worsening on the Right Side, radiates to the front in the gallbladder region     Dizziness     Last 24 hours     Abdominal Pain     Last 24 Hours     Nausea     Last 24 Hours     Constipation     Last Regular BM reportedly 3 weeks ago     Diarrhea     Intermittent w/ Diarrhea x 3 weeks          Patient presents A&Ox3, feeling increasingly worse in the past 24 hours with R sided flank pain that radiates to the abdomen in the gall bladder region. Patient reports she has been running a low grade fever. She is afebrile at present but reports she took 400 mg of Motrin at around 10 o'clock this morning with a glass of water. She had a plain sandwich of egg white, spinach and borden for breakfast. She was feeling dizzy and nauseated at work in addition to her flan/abdomen pain and this prompted her to come into the office. 1. Have you been to the ER, urgent care clinic since your last visit? Hospitalized since your last visit? Denies   2. Have you seen or consulted any other health care providers outside of the [facility] since your last visit? Include any pap smears or colon screening.  Denies

## 2019-01-10 NOTE — PROGRESS NOTES
Written by Sheila Valle, as dictated by Dr. Evelyn Garcia MD.    Edward Baltazar is a 35 y.o. female. HPI  The patient presents today c/o chills and hot flashes which started yesterday. Her temperature was 99.3 F when she got home from work yesterday she  went to sleep straight. Patient's fever broke during the night and her temperature was 98.8 F this morning. She went to work, but started feeling nauseous and feverish. Patient also started experiencing pain in the area of her gallbladder. She has also been experiencing acid reflux, noting that she vomits in her mouth. She has been drinking plenty of water, ginger ale, and tea, but she still feels thirsty. Patient is able to eat solid foods without difficulty, but notes that her nausea is worse after eating. For 3 weeks she has been alternating between constipation and diarrhea and notes that she has not had normal stools. Her last BM was yesterday and was diarrhea. Denies dysuria. Denies HX of gallbladder issues. The patient has an appointment with Dr. Deondre Ferreira (GI) next week. She notes that she received a call from [facility] concerning her visit when she first got sick. Patient was told that [facility] did something wrong and was told that they did not look at her gallbladder. She takes Tylenol #3 once at night per [facility]. She was also prescribed Flexeril but is not taking it. Patient notes that she was prescribed abx x 14 day as she had a UTI in December. Patient Active Problem List   Diagnosis Code    Anxiety disorder F41.9    Allergic asthma J45.909    Chronic tension-type headache, not intractable G44.229    Obesity, morbid ([facility] [address] E66.01        Current Outpatient Medications on File Prior to Visit   Medication Sig Dispense Refill    topiramate ER (TROKENDI XR) 100 mg capsule Take 1 Cap by mouth daily for 90 days. 30 Cap 2    metFORMIN ER (GLUCOPHAGE XR) 750 mg tablet Take 1 Tab by mouth daily.  27 Tab 0    cyanocobalamin (VITAMIN B-12) 1,000 mcg tablet Take 1,000 mcg by mouth daily.  cholecalciferol (VITAMIN D3) 1,000 unit cap Take  by mouth daily.  LORazepam (ATIVAN) 0.5 mg tablet Take  by mouth.  fluticasone (FLONASE) 50 mcg/actuation nasal spray 2 Sprays by Both Nostrils route daily. 1 Bottle 0    buPROPion XL (WELLBUTRIN XL) 300 mg XL tablet TAKE 1 TABLET BY MOUTH ONCE A DAY  2    fluticasone (FLONASE) 50 mcg/actuation nasal spray 2 Sprays by Both Nostrils route daily.  fexofenadine (ALLEGRA) 180 mg tablet Take 180 mg by mouth daily.  albuterol (PROAIR HFA) 90 mcg/actuation inhaler Take 1 Puff by inhalation every four (4) hours as needed for Wheezing. (Patient taking differently: Take 2 Puffs by inhalation every four (4) hours as needed for Wheezing.) 1 Inhaler 3     No current facility-administered medications on file prior to visit.         Allergies   Allergen Reactions    Tomato Itching    Amoxicillin Hives    Grass Pollen-[place]ermuda, Standard Swelling    Iodine Hives    Malt Extract Unknown (comments)    Oak Unknown (comments)    Pcn [Penicillins] Unknown (comments)    Ragweed Unknown (comments)    Shellfish Containing Products Unknown (comments)    Yeast, Dried Unknown (comments)       Past Medical History:   Diagnosis Date    Asthma     Depression     Family history of skin cancer     maternal grandfather - melanoma    Headache(784.0)     PCOS (polycystic ovarian syndrome)     PTSD (post-traumatic stress disorder)     Sun-damaged skin     Tanning bed exposure        Past Surgical History:   Procedure Laterality Date    HX TONSIL AND ADENOIDECTOMY      HX TONSILLECTOMY      HX WISDOM TEETH EXTRACTION         Family History   Problem Relation Age of Onset    Psychiatric Disorder Father     Cancer Maternal Aunt     Heart Disease Maternal Grandfather     Hypertension Maternal Grandfather     Cancer Paternal Grandmother     Diabetes Paternal Grandmother  Hypertension Paternal Grandmother        Social History     Socioeconomic History    Marital status: SINGLE     Spouse name: Not on file    Number of children: Not on file    Years of education: Not on file    Highest education level: Not on file   Social Needs    Financial resource strain: Not on file    Food insecurity - worry: Not on file    Food insecurity - inability: Not on file   [employer] needs - medical: Not on file   [employer] needs - non-medical: Not on file   Occupational History    Not on file   Tobacco Use    Smoking status: Former Smoker    Smokeless tobacco: Never Used   Substance and Sexual Activity    Alcohol use: Yes     Alcohol/week: 1.0 oz     Types: 2 Glasses of wine per week    Drug use: No    Sexual activity: Yes     Partners: Male   Other Topics Concern    Not on file   Social History Narrative    ** Merged History Encounter **            Office Visit on 01/04/2019   Component Date Value Ref Range Status    Amylase 01/04/2019 22* 31 - 124 U/L Final    Lipase 01/04/2019 13* 14 - 72 U/L Final    Glucose 01/04/2019 82  65 - 99 mg/dL Final    BUN 01/04/2019 12  6 - 20 mg/dL Final    Creatinine 01/04/2019 0.80  0.57 - 1.00 mg/dL Final    GFR est non-AA 01/04/2019 97  >59 mL/min/1.73 Final    GFR est AA 01/04/2019 112  >59 mL/min/1.73 Final    BUN/Creatinine ratio 01/04/2019 15  9 - 23 Final    Sodium 01/04/2019 139  134 - 144 mmol/L Final    Potassium 01/04/2019 4.3  3.5 - 5.2 mmol/L Final    Chloride 01/04/2019 102  96 - 106 mmol/L Final    CO2 01/04/2019 24  20 - 29 mmol/L Final    Calcium 01/04/2019 9.2  8.7 - 10.2 mg/dL Final    Protein, total 01/04/2019 6.8  6.0 - 8.5 g/dL Final    Albumin 01/04/2019 3.9  3.5 - 5.5 g/dL Final    GLOBULIN, TOTAL 01/04/2019 2.9  1.5 - 4.5 g/dL Final    A-G Ratio 01/04/2019 1.3  1.2 - 2.2 Final    Bilirubin, total 01/04/2019 0.2  0.0 - 1.2 mg/dL Final    Alk.  phosphatase 01/04/2019 52  39 - 117 IU/L Final    AST (SGOT) 01/04/2019 15  0 - 40 IU/L Final    ALT (SGPT) 01/04/2019 15  0 - 32 IU/L Final    Color (UA POC) 01/04/2019 Yellow   Final    Clarity (UA POC) 01/04/2019 Cloudy   Final    Glucose (UA POC) 01/04/2019 Negative  Negative Final    Bilirubin (UA POC) 01/04/2019 Negative  Negative Final    Ketones (UA POC) 01/04/2019 Negative  Negative Final    Specific gravity (UA POC) 01/04/2019 1.030  1.001 - 1.035 Final    Blood (UA POC) 01/04/2019 Negative  Negative Final    pH (UA POC) 01/04/2019 5.0  4.6 - 8.0 Final    Protein (UA POC) 01/04/2019 Negative  Negative Final    Urobilinogen (UA POC) 01/04/2019 0.2 mg/dL  0.2 - 1 Final    Nitrites (UA POC) 01/04/2019 Negative  Negative Final    Leukocyte esterase (UA POC) 01/04/2019 Negative  Negative Final    WBC 01/04/2019 8.9  3.4 - 10.8 x10E3/uL Final    RBC 01/04/2019 4.49  3.77 - 5.28 x10E6/uL Final    HGB 01/04/2019 13.1  11.1 - 15.9 g/dL Final    HCT 01/04/2019 39.4  34.0 - 46.6 % Final    MCV 01/04/2019 88  79 - 97 fL Final    MCH 01/04/2019 29.2  26.6 - 33.0 pg Final    MCHC 01/04/2019 33.2  31.5 - 35.7 g/dL Final    RDW 01/04/2019 13.9  12.3 - 15.4 % Final    PLATELET 03/81/3160 824  150 - 379 x10E3/uL Final   Office Visit on 11/28/2018   Component Date Value Ref Range Status    Vitamin B12 11/28/2018 708  232 - 1,245 pg/mL Final    VITAMIN D, 25-HYDROXY 11/28/2018 28.6* 30.0 - 100.0 ng/mL Final       Review of Systems   Constitutional: Positive for chills and fever. Negative for malaise/fatigue. Respiratory: Negative for cough and shortness of breath. Gastrointestinal: Positive for abdominal pain, constipation, diarrhea, heartburn and nausea. Genitourinary: Negative for dysuria, frequency and urgency. Musculoskeletal: Negative for joint pain and myalgias. Neurological: Negative for dizziness, tingling, sensory change, weakness and headaches. Psychiatric/Behavioral: Negative for depression, memory loss and substance abuse. Visit Vitals  /88 (BP 1 Location: Right arm, BP Patient Position: Sitting)   Pulse 64   Temp 98.5 °F (36.9 °C) (Oral)   Resp 20   Ht 5' 5\" (1.651 m)   Wt 265 lb 9.6 oz (120.5 kg)   LMP 12/07/2018   SpO2 98%   BMI 44.20 kg/m²         Physical Exam   Constitutional: She is oriented to person, place, and time. She appears well-developed. No distress. Morbidly obese   HENT:   Right Ear: External ear normal.   Left Ear: External ear normal.   Eyes: Conjunctivae and EOM are normal. Right eye exhibits no discharge. Left eye exhibits no discharge. Neck: Normal range of motion. Neck supple. Cardiovascular: Normal rate and regular rhythm. Pulmonary/Chest: Effort normal and breath sounds normal. She has no wheezes. Abdominal: Soft. Bowel sounds are normal. She exhibits distension. There is no tenderness. RUQ tenderness on palpation   Lymphadenopathy:     She has no cervical adenopathy. Neurological: She is alert and oriented to person, place, and time. Skin: She is not diaphoretic. Psychiatric: She has a normal mood and affect. Her behavior is normal.   Nursing note and vitals reviewed. acalcuclus   ASSESSMENT and PLAN    ICD-10-CM ICD-9-CM    1. Nausea R11.0 787.02 ondansetron (ZOFRAN ODT) 8 mg disintegrating tablet sent to pharmacy. Zofran 8 mg prescribed, which she can take BID for nausea. 2. Right upper quadrant pain R10.11 789.01     Levaquin 750 mg prescribed. If her sxs worsen or spike a fever, she should start taking Levaquin. 3. Fever and chills R50.9 780.60 levoFLOXacin (LEVAQUIN) 750 mg tablet script given to patient. Levaquin 750 mg prescribed. If her sxs worsen or spike a fever, she should start taking Levaquin. Discussed that she should tell Dr. Palomo Marinelli that I am concerned about acalculous cholecystitis. 4. Diarrhea, unspecified type R19.7 787.91 She should stop taking metformin. Patient should drink plenty of fluids and follow a liquid diet.  She should not eat carbohydrate or fatty foods for the next few days. Patient can also eat crackers. If her sxs don't worsen and she does not spike a fever, she should continue this regimen and see Dr. Aurora Kinsey next week. This plan was reviewed with the patient and patient agrees. All questions were answered. This scribe documentation was reviewed by me and accurately reflects the examination and decisions made by me. This note will not be viewable in [address]

## 2019-01-15 ENCOUNTER — HOSPITAL ENCOUNTER (OUTPATIENT)
Dept: CT IMAGING | Age: 34
Discharge: HOME | End: 2019-01-15
Attending: INTERNAL MEDICINE
Payer: COMMERCIAL

## 2019-01-15 DIAGNOSIS — R10.11 RIGHT UPPER QUADRANT PAIN: ICD-10-CM

## 2019-01-15 DIAGNOSIS — R19.4 CHANGE IN BOWEL HABITS: ICD-10-CM

## 2019-01-15 PROCEDURE — 74011000255 HC RX REV CODE- 255: Performed by: INTERNAL MEDICINE

## 2019-01-15 PROCEDURE — 74011636320 HC RX REV CODE- 636/320: Performed by: INTERNAL MEDICINE

## 2019-01-15 PROCEDURE — 74011000258 HC RX REV CODE- 258: Performed by: INTERNAL MEDICINE

## 2019-01-15 PROCEDURE — 74177 CT ABD & PELVIS W/CONTRAST: CPT

## 2019-01-15 RX ORDER — BARIUM SULFATE 20 MG/ML
900 SUSPENSION ORAL ONCE
Status: COMPLETED | OUTPATIENT
Start: 2019-01-15 | End: 2019-01-15

## 2019-01-15 RX ORDER — SODIUM CHLORIDE 0.9 % (FLUSH) 0.9 %
10 SYRINGE (ML) INJECTION ONCE
Status: COMPLETED | OUTPATIENT
Start: 2019-01-15 | End: 2019-01-15

## 2019-01-15 RX ADMIN — Medication 10 ML: at 16:29

## 2019-01-15 RX ADMIN — BARIUM SULFATE 900 ML: 20 SUSPENSION ORAL at 16:29

## 2019-01-15 RX ADMIN — IOPAMIDOL 100 ML: 755 INJECTION, SOLUTION INTRAVENOUS at 16:29

## 2019-01-15 RX ADMIN — SODIUM CHLORIDE 50 ML: 900 INJECTION, SOLUTION INTRAVENOUS at 16:29

## 2019-01-17 ENCOUNTER — HOSPITAL ENCOUNTER (OUTPATIENT)
Dept: NUCLEAR MEDICINE | Age: 34
Discharge: HOME | End: 2019-01-17
Attending: INTERNAL MEDICINE
Payer: COMMERCIAL

## 2019-01-17 VITALS — WEIGHT: 257 LBS | BODY MASS INDEX: 42.77 KG/M2

## 2019-01-17 DIAGNOSIS — R10.11 RIGHT UPPER QUADRANT PAIN: ICD-10-CM

## 2019-01-17 DIAGNOSIS — R19.4 CHANGE IN BOWEL HABIT: ICD-10-CM

## 2019-01-17 PROCEDURE — 78227 HEPATOBIL SYST IMAGE W/DRUG: CPT

## 2019-01-17 PROCEDURE — 74011250636 HC RX REV CODE- 250/636

## 2019-01-17 RX ADMIN — SINCALIDE 2.33 MCG: 5 INJECTION, POWDER, LYOPHILIZED, FOR SOLUTION INTRAVENOUS at 09:20

## 2019-01-19 ENCOUNTER — HOSPITAL ENCOUNTER (EMERGENCY)
Age: 34
Discharge: HOME | End: 2019-01-19
Attending: EMERGENCY MEDICINE
Payer: COMMERCIAL

## 2019-01-19 VITALS
TEMPERATURE: 98.7 F | OXYGEN SATURATION: 99 % | HEART RATE: 82 BPM | RESPIRATION RATE: 18 BRPM | WEIGHT: 255.51 LBS | DIASTOLIC BLOOD PRESSURE: 97 MMHG | SYSTOLIC BLOOD PRESSURE: 132 MMHG | BODY MASS INDEX: 42.52 KG/M2
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
## 2019-01-19 PROCEDURE — 74011250636 HC RX REV CODE- 250/636: Performed by: NURSE PRACTITIONER

## 2019-01-19 PROCEDURE — 90471 IMMUNIZATION ADMIN: CPT

## 2019-01-19 PROCEDURE — 96372 THER/PROPH/DIAG INJ SC/IM: CPT

## 2019-01-19 PROCEDURE — 99283 EMERGENCY DEPT VISIT LOW MDM: CPT

## 2019-01-19 PROCEDURE — 74011250637 HC RX REV CODE- 250/637: Performed by: NURSE PRACTITIONER

## 2019-01-19 PROCEDURE — 90675 RABIES VACCINE IM: CPT | Performed by: NURSE PRACTITIONER

## 2019-01-19 PROCEDURE — 90376 RABIES IG HEAT TREATED: CPT | Performed by: NURSE PRACTITIONER

## 2019-01-19 RX ORDER — DOXYCYCLINE HYCLATE 100 MG
100 TABLET ORAL
Status: COMPLETED | OUTPATIENT
Start: 2019-01-19 | End: 2019-01-19

## 2019-01-19 RX ORDER — DOXYCYCLINE HYCLATE 100 MG
100 TABLET ORAL 2 TIMES DAILY
Qty: 14 TAB | Refills: 0 | Status: SHIPPED | OUTPATIENT
Start: 2019-01-19 | End: 2019-01-26

## 2019-01-19 RX ADMIN — RABIES IMMUNE GLOBULIN (HUMAN) 2310 UNITS: 300 INJECTION, SOLUTION INFILTRATION; INTRAMUSCULAR at 19:05

## 2019-01-19 RX ADMIN — DOXYCYCLINE HYCLATE 100 MG: 100 TABLET, COATED ORAL at 19:05

## 2019-01-19 RX ADMIN — Medication 2.5 UNITS: at 19:07

## 2019-01-19 NOTE — ED PROVIDER NOTES
Liliana Phillip is a 35 y.o. female with Hx of PTSD, PCOS, anxiety, depression, asthma,  headaches who presents ambulatory to [facility] ED with cc of animal bite. Pt reports she was bit by a kitten last night on the L hand in [facility]. Did not call animal control. Pt states that it bit her near where she works. Presumed the kitten is not vaccinated, pt reports that kitten ran off and she would not be able to recognize the cat if she saw it. No F/C, N/V/D, cough, congestion, CP, SOB, dysuria, urinary frequency/hesitancy, flank pain. Denies chance of pregnancy. TDAP is UTD. PCP: Vipin Ho MD    There are no other complaints, changes or physical findings at this time.                Past Medical History:   Diagnosis Date    Asthma     Depression     Family history of skin cancer     maternal grandfather - melanoma    Headache(784.0)     PCOS (polycystic ovarian syndrome)     PTSD (post-traumatic stress disorder)     Sun-damaged skin     Tanning bed exposure        Past Surgical History:   Procedure Laterality Date    HX TONSIL AND ADENOIDECTOMY      HX TONSILLECTOMY      HX WISDOM TEETH EXTRACTION           Family History:   Problem Relation Age of Onset    Psychiatric Disorder Father     Cancer Maternal Aunt     Heart Disease Maternal Grandfather     Hypertension Maternal Grandfather     Cancer Paternal Grandmother     Diabetes Paternal Grandmother     Hypertension Paternal Grandmother        Social History     Socioeconomic History    Marital status: SINGLE     Spouse name: Not on file    Number of children: Not on file    Years of education: Not on file    Highest education level: Not on file   Social Needs    Financial resource strain: Not on file    Food insecurity - worry: Not on file    Food insecurity - inability: Not on file   [employer] needs - medical: Not on file   [employer] needs - non-medical: Not on file   Occupational History    Not on file   Tobacco Use  Smoking status: Former Smoker    Smokeless tobacco: Never Used   Substance and Sexual Activity    Alcohol use: Yes     Alcohol/week: 1.0 oz     Types: 2 Glasses of wine per week    Drug use: No    Sexual activity: Yes     Partners: Male   Other Topics Concern    Not on file   Social History Narrative    ** Merged History Encounter **              ALLERGIES: Tomato; Amoxicillin; Grass pollen-bermuda, standard; Iodine; Malt extract; Oak; Pcn [penicillins]; Ragweed; Shellfish containing products; and Yeast, dried    Review of Systems   Constitutional: Negative for activity change, appetite change, chills and fever. HENT: Negative for congestion, rhinorrhea, sinus pressure, sneezing and sore throat. Eyes: Negative for pain, discharge and visual disturbance. Respiratory: Negative for cough and shortness of breath. Cardiovascular: Negative for chest pain. Gastrointestinal: Negative for abdominal pain, diarrhea, nausea and vomiting. Genitourinary: Negative for dysuria, flank pain, frequency and urgency. Musculoskeletal: Negative for arthralgias, back pain, gait problem, joint swelling, myalgias and neck pain. Skin: Positive for wound. Negative for color change and rash. Neurological: Negative for dizziness, speech difficulty, weakness, light-headedness, numbness and headaches. Psychiatric/Behavioral: Negative for agitation, behavioral problems and confusion. All other systems reviewed and are negative. Vitals:    01/19/19 1820 01/19/19 1827 01/19/19 1942   BP:   (!) 132/97   Pulse: 98 82    Resp:  18    Temp:  98.7 °F (37.1 °C)    SpO2: 100% 100% 99%   Weight:  115.9 kg (255 lb 8.2 oz)             Physical Exam   Constitutional: She is oriented to person, place, and time. She appears well-developed and well-nourished. No distress. HENT:   Head: Normocephalic and atraumatic.    Right Ear: External ear normal.   Left Ear: External ear normal.   Nose: Nose normal.   Mouth/Throat: Oropharynx is clear and moist. No oropharyngeal exudate. Eyes: Conjunctivae and EOM are normal. Pupils are equal, round, and reactive to light. Neck: Normal range of motion. Neck supple. Cardiovascular: Normal rate, regular rhythm, normal heart sounds and intact distal pulses. Pulmonary/Chest: Effort normal and breath sounds normal.   Musculoskeletal: Normal range of motion. Neurological: She is alert and oriented to person, place, and time. Skin: Skin is warm and dry. Punctate scab noted on the dorsal aspect of the L hand    Psychiatric: She has a normal mood and affect. Her behavior is normal. Judgment and thought content normal.   Nursing note and vitals reviewed. MDM  Number of Diagnoses or Management Options  Cat bite, initial encounter:   Diagnosis management comments: DDx: animal bite, possible rabies exposure     36 yo F presents w/ animal bite to hand by feral cat. Has PCN allergy, started on Doxy. Given rabies vaccine education. Provided w/ education on wound care for home and reasons to return to ED. Given education on outpt resources to get further Rabies vaccinations. Amount and/or Complexity of Data Reviewed  Review and summarize past medical records: yes           Procedures     LABORATORY TESTS:  No results found for this or any previous visit (from the past 12 hour(s)). IMAGING RESULTS:  No orders to display       MEDICATIONS GIVEN:  Medications   rabies vaccine, PCEC (RABAVERT) kit 2.5 Units (2.5 Units IntraMUSCular Given 1/19/19 1907)   doxycycline (VIBRA-TABS) tablet 100 mg (100 mg Oral Given 1/19/19 1905)   rabies immune globulin (PF) (HYPERAB) injection 2,310 Units (2,310 Units Infiltration Given 1/19/19 1905)       IMPRESSION:  1. Cat bite, initial encounter        PLAN:  1.    Discharge Medication List as of 1/19/2019  7:36 PM      START taking these medications    Details   doxycycline (VIBRA-TABS) 100 mg tablet Take 1 Tab by mouth two (2) times a day for 7 days., Print, Disp-14 Tab, R-0      rabies vaccine, PCEC (RABAVERT) 2.5 unit injection 1 mL by IntraMUSCular route now for 1 dose., Print, Disp-1 mL, R-2         CONTINUE these medications which have NOT CHANGED    Details   ondansetron (ZOFRAN ODT) 8 mg disintegrating tablet Take 1 Tab by mouth every eight (8) hours as needed for Nausea for up to 10 doses. , Normal, Disp-10 Tab, R-0      levoFLOXacin (LEVAQUIN) 750 mg tablet Take 1 Tab by mouth daily for 10 days. , Print, Disp-10 Tab, R-0      topiramate ER (TROKENDI XR) 100 mg capsule Take 1 Cap by mouth daily for 90 days. , Normal, Disp-30 Cap, R-2      metFORMIN ER (GLUCOPHAGE XR) 750 mg tablet Take 1 Tab by mouth daily. , Historical Med, Disp-30 Tab, R-0      cyanocobalamin (VITAMIN B-12) 1,000 mcg tablet Take 1,000 mcg by mouth daily. , Historical Med      cholecalciferol (VITAMIN D3) 1,000 unit cap Take  by mouth daily. , Historical Med      LORazepam (ATIVAN) 0.5 mg tablet Take  by mouth., Historical Med      !! fluticasone (FLONASE) 50 mcg/actuation nasal spray 2 Sprays by Both Nostrils route daily. , Normal, Disp-1 Bottle, R-0      buPROPion XL (WELLBUTRIN XL) 300 mg XL tablet TAKE 1 TABLET BY MOUTH ONCE A DAY, Historical Med, R-2      !! fluticasone (FLONASE) 50 mcg/actuation nasal spray 2 Sprays by Both Nostrils route daily. , Historical Med      fexofenadine (ALLEGRA) 180 mg tablet Take 180 mg by mouth daily. , Historical Med      albuterol (PROAIR HFA) 90 mcg/actuation inhaler Take 1 Puff by inhalation every four (4) hours as needed for Wheezing., Normal, Disp-1 Inhaler, R-3       !! - Potential duplicate medications found. Please discuss with provider.         2.   Follow-up Information     Follow up With Specialties Details Why Contact Info    Mc Burger MD Internal Medicine Schedule an appointment as soon as possible for a visit  [address] [place] [address] Internal Medicine  [facility] [address] [phone]      [facility] EMERGENCY DEP Emergency Medicine Go to As needed, If symptoms worsen Musa Milligan  [phone]        3. Return to ED if worse       Discharge Note:    The patient is ready for discharge. The patient's signs, symptoms, diagnosis, and discharge instruction have been discussed and the patient has conveyed their understanding. The patient is to follow up as recommended or return to the ER should their symptoms worsen. Plan has been discussed and the patient is in agreement.     Ada Villeda NP

## 2019-01-20 NOTE — DISCHARGE INSTRUCTIONS
Patient Education        Animal Bites: Care Instructions  Your Care Instructions  After an animal bite, the biggest concern is infection. The chance of infection depends on the type of animal that bit you, where on your body you were bitten, and your general health. Many animal bites are not closed with stitches, because this can increase the chance of infection. Your bite may take as little as 7 days or as long as several months to heal, depending on how bad it is. Taking good care of your wound at home will help it heal and reduce your chance of infection. The doctor has checked you carefully, but problems can develop later. If you notice any problems or new symptoms, get medical treatment right away. Follow-up care is a key part of your treatment and safety. Be sure to make and go to all appointments, and call your doctor if you are having problems. It's also a good idea to know your test results and keep a list of the medicines you take. How can you care for yourself at home? · If your doctor told you how to care for your wound, follow your doctor's instructions. If you did not get instructions, follow this general advice:  ? After 24 to 48 hours, gently wash the wound with clean water 2 times a day. Do not scrub or soak the wound. Don't use hydrogen peroxide or alcohol, which can slow healing. ? You may cover the wound with a thin layer of petroleum jelly, such as Vaseline, and a nonstick bandage. ? Apply more petroleum jelly and replace the bandage as needed. · After you shower, gently dry the wound with a clean towel. · If your doctor has closed the wound, cover the bandage with a plastic bag before you take a shower. · A small amount of skin redness and swelling around the wound edges and the stitches or staples is normal. Your wound may itch or feel irritated. Do not scratch or rub the wound.   · Ask your doctor if you can take an over-the-counter pain medicine, such as acetaminophen (Tylenol), ibuprofen (Advil, Motrin), or naproxen (Aleve). Read and follow all instructions on the label. · Do not take two or more pain medicines at the same time unless the doctor told you to. Many pain medicines have acetaminophen, which is Tylenol. Too much acetaminophen (Tylenol) can be harmful. · If your bite puts you at risk for rabies, you will get a series of shots over the next few weeks to prevent rabies. Your doctor will tell you when to get the shots. It is very important that you get the full cycle of shots. Follow your doctor's instructions exactly. · You may need a tetanus shot if you have not received one in the last 5 years. · If your doctor prescribed antibiotics, take them as directed. Do not stop taking them just because you feel better. You need to take the full course of antibiotics. When should you call for help? Call your doctor now or seek immediate medical care if:    · The skin near the bite turns cold or pale or it changes color.     · You lose feeling in the area near the bite, or it feels numb or tingly.     · You have trouble moving a limb near the bite.     · You have symptoms of infection, such as:  ? Increased pain, swelling, warmth, or redness near the wound. ? Red streaks leading from the wound. ? Pus draining from the wound. ? A fever.     · Blood soaks through the bandage. Oozing small amounts of blood is normal.     · Your pain is getting worse.    Watch closely for changes in your health, and be sure to contact your doctor if you are not getting better as expected. Where can you learn more? Go to http://marcelle-yvonne.info/. Enter O524 in the search box to learn more about \"Animal Bites: Care Instructions. \"  Current as of: September 23, 2018  Content Version: 11.9  © 0815-7819 [employer]. Care instructions adapted under license by [employer] (which disclaims liability or warranty for this information).  If you have questions about a medical condition or this instruction, always ask your healthcare professional. [place] [address] any warranty or liability for your use of this information.

## 2019-01-21 DIAGNOSIS — Z20.3 NEED FOR POST EXPOSURE PROPHYLAXIS FOR RABIES: Primary | ICD-10-CM

## 2019-02-04 ENCOUNTER — ANESTHESIA (OUTPATIENT)
Dept: ENDOSCOPY | Age: 34
End: 2019-02-04
Payer: COMMERCIAL

## 2019-02-04 ENCOUNTER — HOSPITAL ENCOUNTER (OUTPATIENT)
Age: 34
Setting detail: OUTPATIENT SURGERY
Discharge: HOME | End: 2019-02-04
Attending: INTERNAL MEDICINE | Admitting: INTERNAL MEDICINE
Payer: COMMERCIAL

## 2019-02-04 ENCOUNTER — ANESTHESIA EVENT (OUTPATIENT)
Dept: ENDOSCOPY | Age: 34
End: 2019-02-04
Payer: COMMERCIAL

## 2019-02-04 VITALS
BODY MASS INDEX: 42.49 KG/M2 | HEART RATE: 86 BPM | TEMPERATURE: 98 F | WEIGHT: 255 LBS | SYSTOLIC BLOOD PRESSURE: 127 MMHG | DIASTOLIC BLOOD PRESSURE: 79 MMHG | RESPIRATION RATE: 15 BRPM | OXYGEN SATURATION: 100 % | HEIGHT: 65 IN

## 2019-02-04 LAB — HCG UR QL: NEGATIVE

## 2019-02-04 PROCEDURE — 77030009426 HC FCPS BIOP ENDOSC BSC -B: Performed by: INTERNAL MEDICINE

## 2019-02-04 PROCEDURE — 88305 TISSUE EXAM BY PATHOLOGIST: CPT

## 2019-02-04 PROCEDURE — 76060000031 HC ANESTHESIA FIRST 0.5 HR: Performed by: INTERNAL MEDICINE

## 2019-02-04 PROCEDURE — 77030027957 HC TBNG IRR ENDOGTR BUSS -B: Performed by: INTERNAL MEDICINE

## 2019-02-04 PROCEDURE — 76040000019: Performed by: INTERNAL MEDICINE

## 2019-02-04 PROCEDURE — 74011250636 HC RX REV CODE- 250/636

## 2019-02-04 PROCEDURE — 81025 URINE PREGNANCY TEST: CPT

## 2019-02-04 RX ORDER — NALOXONE HYDROCHLORIDE 0.4 MG/ML
0.4 INJECTION, SOLUTION INTRAMUSCULAR; INTRAVENOUS; SUBCUTANEOUS
Status: DISCONTINUED | OUTPATIENT
Start: 2019-02-04 | End: 2019-02-04 | Stop reason: HOSPADM

## 2019-02-04 RX ORDER — ATROPINE SULFATE 0.1 MG/ML
0.5 INJECTION INTRAVENOUS
Status: DISCONTINUED | OUTPATIENT
Start: 2019-02-04 | End: 2019-02-04 | Stop reason: HOSPADM

## 2019-02-04 RX ORDER — DEXTROMETHORPHAN/PSEUDOEPHED 2.5-7.5/.8
1.2 DROPS ORAL
Status: DISCONTINUED | OUTPATIENT
Start: 2019-02-04 | End: 2019-02-04 | Stop reason: HOSPADM

## 2019-02-04 RX ORDER — SODIUM CHLORIDE 0.9 % (FLUSH) 0.9 %
5-40 SYRINGE (ML) INJECTION AS NEEDED
Status: DISCONTINUED | OUTPATIENT
Start: 2019-02-04 | End: 2019-02-04 | Stop reason: HOSPADM

## 2019-02-04 RX ORDER — SODIUM CHLORIDE 9 MG/ML
INJECTION, SOLUTION INTRAVENOUS
Status: DISCONTINUED | OUTPATIENT
Start: 2019-02-04 | End: 2019-02-04 | Stop reason: HOSPADM

## 2019-02-04 RX ORDER — SODIUM CHLORIDE 9 MG/ML
50 INJECTION, SOLUTION INTRAVENOUS CONTINUOUS
Status: DISCONTINUED | OUTPATIENT
Start: 2019-02-04 | End: 2019-02-04 | Stop reason: HOSPADM

## 2019-02-04 RX ORDER — LIDOCAINE HYDROCHLORIDE 20 MG/ML
INJECTION, SOLUTION EPIDURAL; INFILTRATION; INTRACAUDAL; PERINEURAL AS NEEDED
Status: DISCONTINUED | OUTPATIENT
Start: 2019-02-04 | End: 2019-02-04 | Stop reason: HOSPADM

## 2019-02-04 RX ORDER — FLUMAZENIL 0.1 MG/ML
0.2 INJECTION INTRAVENOUS
Status: DISCONTINUED | OUTPATIENT
Start: 2019-02-04 | End: 2019-02-04 | Stop reason: HOSPADM

## 2019-02-04 RX ORDER — SODIUM CHLORIDE 0.9 % (FLUSH) 0.9 %
5-40 SYRINGE (ML) INJECTION EVERY 8 HOURS
Status: DISCONTINUED | OUTPATIENT
Start: 2019-02-04 | End: 2019-02-04 | Stop reason: HOSPADM

## 2019-02-04 RX ORDER — EPINEPHRINE 0.1 MG/ML
1 INJECTION INTRACARDIAC; INTRAVENOUS
Status: DISCONTINUED | OUTPATIENT
Start: 2019-02-04 | End: 2019-02-04 | Stop reason: HOSPADM

## 2019-02-04 RX ORDER — MIDAZOLAM HYDROCHLORIDE 1 MG/ML
.25-1 INJECTION, SOLUTION INTRAMUSCULAR; INTRAVENOUS
Status: DISCONTINUED | OUTPATIENT
Start: 2019-02-04 | End: 2019-02-04 | Stop reason: HOSPADM

## 2019-02-04 RX ORDER — FENTANYL CITRATE 50 UG/ML
100 INJECTION, SOLUTION INTRAMUSCULAR; INTRAVENOUS
Status: DISCONTINUED | OUTPATIENT
Start: 2019-02-04 | End: 2019-02-04 | Stop reason: HOSPADM

## 2019-02-04 RX ORDER — PROPOFOL 10 MG/ML
INJECTION, EMULSION INTRAVENOUS AS NEEDED
Status: DISCONTINUED | OUTPATIENT
Start: 2019-02-04 | End: 2019-02-04 | Stop reason: HOSPADM

## 2019-02-04 RX ADMIN — PROPOFOL 50 MG: 10 INJECTION, EMULSION INTRAVENOUS at 16:34

## 2019-02-04 RX ADMIN — PROPOFOL 40 MG: 10 INJECTION, EMULSION INTRAVENOUS at 16:46

## 2019-02-04 RX ADMIN — PROPOFOL 30 MG: 10 INJECTION, EMULSION INTRAVENOUS at 16:38

## 2019-02-04 RX ADMIN — PROPOFOL 100 MG: 10 INJECTION, EMULSION INTRAVENOUS at 16:33

## 2019-02-04 RX ADMIN — PROPOFOL 30 MG: 10 INJECTION, EMULSION INTRAVENOUS at 16:36

## 2019-02-04 RX ADMIN — PROPOFOL 30 MG: 10 INJECTION, EMULSION INTRAVENOUS at 16:35

## 2019-02-04 RX ADMIN — PROPOFOL 50 MG: 10 INJECTION, EMULSION INTRAVENOUS at 16:41

## 2019-02-04 RX ADMIN — PROPOFOL 30 MG: 10 INJECTION, EMULSION INTRAVENOUS at 16:42

## 2019-02-04 RX ADMIN — PROPOFOL 40 MG: 10 INJECTION, EMULSION INTRAVENOUS at 16:44

## 2019-02-04 RX ADMIN — SODIUM CHLORIDE: 9 INJECTION, SOLUTION INTRAVENOUS at 15:45

## 2019-02-04 RX ADMIN — PROPOFOL 40 MG: 10 INJECTION, EMULSION INTRAVENOUS at 16:43

## 2019-02-04 RX ADMIN — PROPOFOL 50 MG: 10 INJECTION, EMULSION INTRAVENOUS at 16:40

## 2019-02-04 RX ADMIN — LIDOCAINE HYDROCHLORIDE 50 MG: 20 INJECTION, SOLUTION EPIDURAL; INFILTRATION; INTRACAUDAL; PERINEURAL at 16:33

## 2019-02-04 NOTE — PROGRESS NOTES
Bedside and Verbal shift change report given to jessica bassett (oncoming nurse) by Neymar Resendiz (offgoing nurse). Report included the following information SBAR.

## 2019-02-04 NOTE — H&P
118 [facility].  [address] [place] [address], [address]  [phone]                                History and Physical     NAME: Brittny Escobedo   :  1985   MRN:  [id number]     HPI:  The patient was seen and examined. Past Surgical History:   Procedure Laterality Date    HX TONSIL AND ADENOIDECTOMY      HX TONSILLECTOMY      HX WISDOM TEETH EXTRACTION       Past Medical History:   Diagnosis Date    Asthma     Depression     Family history of skin cancer     maternal grandfather - melanoma    Headache(784.0)     PCOS (polycystic ovarian syndrome)     PTSD (post-traumatic stress disorder)     Sun-damaged skin     Tanning bed exposure      Social History     Tobacco Use    Smoking status: Former Smoker    Smokeless tobacco: Never Used   Substance Use Topics    Alcohol use:  Yes     Alcohol/week: 1.0 oz     Types: 2 Glasses of wine per week    Drug use: No     Allergies   Allergen Reactions    Tomato Itching    Amoxicillin Hives    Grass Pollen-[place], Standard Swelling    Iodine Hives    Malt Extract Unknown (comments)    Oak Unknown (comments)    Pcn [Penicillins] Unknown (comments)    Ragweed Unknown (comments)    Shellfish Containing Products Unknown (comments)    Yeast, Dried Unknown (comments)     Family History   Problem Relation Age of Onset    Psychiatric Disorder Father     Cancer Maternal Aunt     Heart Disease Maternal Grandfather     Hypertension Maternal Grandfather     Cancer Paternal Grandmother     Diabetes Paternal Grandmother     Hypertension Paternal Grandmother      Current Facility-Administered Medications   Medication Dose Route Frequency    0.9% sodium chloride infusion  50 mL/hr IntraVENous CONTINUOUS    sodium chloride (NS) flush 5-40 mL  5-40 mL IntraVENous Q8H    sodium chloride (NS) flush 5-40 mL  5-40 mL IntraVENous PRN    midazolam (VERSED) injection 0.25-10 mg  0.25-10 mg IntraVENous Multiple    fentaNYL citrate (PF) injection 100 mcg  100 mcg IntraVENous MULTIPLE DOSE GIVEN    naloxone (NARCAN) injection 0.4 mg  0.4 mg IntraVENous Multiple    flumazenil (ROMAZICON) 0.1 mg/mL injection 0.2 mg  0.2 mg IntraVENous Multiple    simethicone (MYLICON) 81FB/8.9GY oral drops 80 mg  1.2 mL Oral Multiple    atropine injection 0.5 mg  0.5 mg IntraVENous ONCE PRN    EPINEPHrine (ADRENALIN) 0.1 mg/mL syringe 1 mg  1 mg Endoscopically ONCE PRN     Facility-Administered Medications Ordered in Other Encounters   Medication Dose Route Frequency    0.9% sodium chloride infusion   IntraVENous CONTINUOUS         PHYSICAL EXAM:  General: WD, WN. Alert, cooperative, no acute distress    HEENT: NC, Atraumatic. PERRLA, EOMI. Anicteric sclerae. Lungs:  CTA Bilaterally. No Wheezing/Rhonchi/Rales. Heart:  Regular  rhythm,  No murmur, No Rubs, No Gallops  Abdomen: Soft, Non distended, Non tender.  +Bowel sounds, no HSM  Extremities: No c/c/e  Neurologic:  CN 2-12 gi, Alert and oriented X 3. No acute neurological distress   Psych:   Good insight. Not anxious nor agitated. The heart, lungs and mental status were satisfactory for the administration of MAc sedation and for the procedure.       Mallampati score: 3       Assessment:   · RUQpain  · Diarrhea  · Change in bowel habits    Plan:   · Endoscopic procedure  · MAC sedation   ·

## 2019-02-04 NOTE — PROCEDURES
118 [facility] [address]  [address] [place] [address], [address]  [phone]                           Colonoscopy and EGD Procedure Note      Indications:    Change in bowel habits, RUQ pain     :  Eda Hammer MD    Referring Provider: Hoang Donovan MD    Sedation:  MAC anesthesia    Procedure Details:  After informed consent was obtained with all risks and benefits of procedure explained and preoperative exam completed, the patient was taken to the endoscopy suite and placed in the left lateral decubitus position. Upon sequential sedation as per above, a digital rectal exam was performed  And was normal.  The Olympus videocolonoscope  was inserted in the rectum and carefully advanced to the cecum and terminal ileum, which was identified by the ileocecal valve and appendiceal orifice. The quality of preparation was good. The colonoscope was slowly withdrawn with careful evaluation between folds. Retroflexion in the rectum was performed and was normal..     Colon Findings:   Rectum: no mucosal lesion appreciated  Grade 1 internal hemorrhoid(s); Sigmoid: no mucosal lesion appreciated  Descending Colon: no mucosal lesion appreciated  Transverse Colon: no mucosal lesion appreciated  Ascending Colon: no mucosal lesion appreciated  Cecum: no mucosal lesion appreciated  Terminal Ileum: no mucosal lesion appreciated      Following sequential administration of sedation as per above, the [id number] gastroscope was inserted into the mouth and advanced under direct vision to second portion of the duodenum. A careful inspection was made as the gastroscope was withdrawn, including a retroflexed view of the proximal stomach; findings and interventions are described below. EGD Findings:  Esophagus:normal  Stomach: few lobulated benign appearing fundic gland polyps 3-5 mm in size were seen in the gastric fundus.  Rest of the stomach was otherwise normal.  Duodenum/jejunum:normal      Therapies: biopsy of stomach fundus    Complications:   None; patient tolerated the procedure well. Impression:    See Postoperative diagnosis above    Recommendations:  -Follow symptoms. , -Low fat diet. , -Follow up with me.  -Await pathology. Resume normal medication(s). Interventions:  biopsy of stomach fundus polyp    Complications: None. Specimen Removed:    ID Type Source Tests Collected by Time Destination   1 : Fundal Gland Polyps Preservative   Karyn Mckeon MD 2/4/2019 1637 Pathology       EBL:  None. Discharge Disposition:  Home in the company of a  when able to ambulate.     Jordan Antonio MD  2/4/2019  4:53 PM

## 2019-02-04 NOTE — ANESTHESIA PREPROCEDURE EVALUATION
Anesthetic History   No history of anesthetic complications            Review of Systems / Medical History  Patient summary reviewed, nursing notes reviewed and pertinent labs reviewed    Pulmonary  Within defined limits                 Neuro/Psych   Within defined limits           Cardiovascular  Within defined limits                     GI/Hepatic/Renal  Within defined limits              Endo/Other  Within defined limits           Other Findings              Physical Exam    Airway  Mallampati: II  TM Distance: > 6 cm  Neck ROM: normal range of motion   Mouth opening: Normal     Cardiovascular  Regular rate and rhythm,  S1 and S2 normal,  no murmur, click, rub, or gallop             Dental  No notable dental hx       Pulmonary  Breath sounds clear to auscultation               Abdominal  GI exam deferred       Other Findings            Anesthetic Plan    ASA: 2  Anesthesia type: MAC          Induction: Intravenous  Anesthetic plan and risks discussed with: Patient

## 2019-02-04 NOTE — DISCHARGE INSTRUCTIONS
[address]  [address] [facility] [address] [facility], [address]  [address] [place]  [phone]  1985    DISCOMFORT:  Redness at IV site- apply warm compress to area; if redness or soreness persist- contact your physician  There may be a slight amount of blood passed from the rectum  Gaseous discomfort- walking, belching will help relieve any discomfort  You may not operate a vehicle for 12 hours  You may not  engage in an occupation involving machinery or appliances for rest of today  You may not  drink alcoholic beverages for at least 12 hours  Avoid making any critical decisions for at least 24 hour    DIET:   Low fat diet. - however -  remember your colon is empty and a heavy meal will produce gas. Avoid these foods:  vegetables, fried / greasy foods, carbonated drinks for today      ACTIVITY  You may resume your normal daily activities   Spend the remainder of the day resting -  avoid any strenuous activity. CALL M.D. ANY SIGN OF   Increasing pain, nausea, vomiting  Abdominal distension (swelling)  New increased bleeding (oral or rectal)  Fever (chills)  Pain in chest area  Bloody discharge from nose or mouth  Shortness of breath    You may not  take any Advil, Aspirin, Ibuprofen, Motrin, Aleve, or Goodys for 5 days, ONLY  Tylenol as needed for pain. Post procedure diagnosis:  1.- Fundal Gland Polyps  2.- Normal Colonoscopy    Follow-up Instructions:   Call Dr. Samuel Reason for any questions or problems. If we took a biopsy please call the office within 2 weeks to discuss your                             pathology results. Telephone # [phone]     Patient Education        Learning About Gastric Polyps  What are gastric polyps? Gastric polyps are growths in the stomach. Most people who get them don't have any problems. The cause of most gastric polyps is not known.  But some polyps grow in people who use stomach acid reducers called proton pump inhibitors (PPIs). People who have gastritis, ulcers, or an H. pylori infection in the stomach can sometimes get polyps. People who have a parent, brother, or sister with gastric polyps might have them too. Most gastric polyps aren't cancer. But a certain kind of polyp can turn into cancer. What are the symptoms? You may not know that you have gastric polyps. Most polyps don't lead to symptoms. Once in a while, larger polyps may cause bleeding, belly pain, or a blockage in the stomach. How are polyps diagnosed and treated? Most gastric polyps are found during an endoscopy (say \"ec-DXL-erme-pee\") that is done for another health problem. Endoscopy is a test that uses a thin flexible tube to allow a doctor to look at the inside of your stomach. Your doctor will treat your polyps based on what he or she sees during this test. If your doctor finds a polyp during the test, he or she may take it out. It will then be tested to make sure it isn't cancer. If your doctor finds H. pylori bacteria in your stomach, he or she will prescribe antibiotics. If you have been taking a PPI, the doctor may prescribe a different medicine instead. Sometimes the doctor may suggest another endoscopy to see how treatment is working. He or she may also suggest this to recheck certain kinds of polyps. The doctor may also suggest a colonoscopy to look for polyps in your colon. Follow-up care is a key part of your treatment and safety. Be sure to make and go to all appointments, and call your doctor if you are having problems. It's also a good idea to know your test results and keep a list of the medicines you take. Where can you learn more? Go to http://marcelle-yvonne.info/. Enter M640 in the search box to learn more about \"Learning About Gastric Polyps. \"  Current as of: March 27, 2018  Content Version: 11.9  © 3238-2089 [employer].  Care instructions adapted under license by [address] (which disclaims liability or warranty for this information). If you have questions about a medical condition or this instruction, always ask your healthcare professional. [address] any warranty or liability for your use of this information.

## 2019-02-04 NOTE — ANESTHESIA POSTPROCEDURE EVALUATION
Post-Anesthesia Evaluation and Assessment    Patient: Lupe Mason MRN: [id number]  SSN: [id number]    YOB: 1985  Age: 35 y.o. Sex: female      I have evaluated the patient and they are stable and ready for discharge from the PACU. Cardiovascular Function/Vital Signs  Visit Vitals  /79   Pulse 86   Temp 36.7 °C (98 °F)   Resp 15   Ht 5' 5\" (1.651 m)   Wt 115.7 kg (255 lb)   SpO2 100%   Breastfeeding? No   BMI 42.43 kg/m²       Patient is status post MAC anesthesia for Procedure(s):  COLONOSCOPY, EGD  ESOPHAGOGASTRODUODENOSCOPY (EGD)  ENDOSCOPIC POLYPECTOMY. Nausea/Vomiting: None    Postoperative hydration reviewed and adequate. Pain:  Pain Scale 1: Numeric (0 - 10) (02/04/19 1724)  Pain Intensity 1: 1 (02/04/19 1724)   Managed    Neurological Status: At baseline    Mental Status, Level of Consciousness: Alert and  oriented to person, place, and time    Pulmonary Status:   O2 Device: Room air (02/04/19 1724)   Adequate oxygenation and airway patent    Complications related to anesthesia: None    Post-anesthesia assessment completed. No concerns    Signed By: Margarito Fonseca MD     February 4, 2019              Procedure(s):  COLONOSCOPY, EGD  ESOPHAGOGASTRODUODENOSCOPY (EGD)  ENDOSCOPIC POLYPECTOMY.    <BSHSIANPOST>    Visit Vitals  /79   Pulse 86   Temp 36.7 °C (98 °F)   Resp 15   Ht 5' 5\" (1.651 m)   Wt 115.7 kg (255 lb)   SpO2 100%   Breastfeeding?  No   BMI 42.43 kg/m²

## 2019-02-04 NOTE — PROGRESS NOTES

## 2019-04-17 ENCOUNTER — OFFICE VISIT (OUTPATIENT)
Dept: URGENT CARE | Age: 34
End: 2019-04-17

## 2019-04-17 VITALS
SYSTOLIC BLOOD PRESSURE: 125 MMHG | HEART RATE: 94 BPM | WEIGHT: 249 LBS | TEMPERATURE: 98 F | DIASTOLIC BLOOD PRESSURE: 77 MMHG | HEIGHT: 65 IN | RESPIRATION RATE: 16 BRPM | OXYGEN SATURATION: 98 % | BODY MASS INDEX: 41.48 KG/M2

## 2019-04-17 DIAGNOSIS — S63.501A WRIST SPRAIN, RIGHT, INITIAL ENCOUNTER: Primary | ICD-10-CM

## 2019-04-18 NOTE — PATIENT INSTRUCTIONS
Wrist Sprain: Care Instructions  Your Care Instructions    Your wrist hurts because you have stretched or torn ligaments, which connect the bones in your wrist.  Wrist sprains usually take from 2 to 10 weeks to heal, but some take longer. Usually, the more pain you have, the more severe your wrist sprain is and the longer it will take to heal. You can heal faster and regain strength in your wrist with good home treatment. Follow-up care is a key part of your treatment and safety. Be sure to make and go to all appointments, and call your doctor if you are having problems. It's also a good idea to know your test results and keep a list of the medicines you take. How can you care for yourself at home? · Prop up your arm on a pillow when you ice it or anytime you sit or lie down for the next 3 days. Try to keep your wrist above the level of your heart. This will help reduce swelling. · Put ice or cold packs on your wrist for 10 to 20 minutes at a time. Try to do this every 1 to 2 hours for the next 3 days (when you are awake) or until the swelling goes down. Put a thin cloth between the ice pack and your skin. · After 2 or 3 days, if your swelling is gone, apply a heating pad set on low or a warm cloth to your wrist. This helps keep your wrist flexible. Some doctors suggest that you go back and forth between hot and cold. · If you have an elastic bandage, keep it on for the next 24 to 36 hours. The bandage should be snug but not so tight that it causes numbness or tingling. To rewrap the wrist, wrap the bandage around the hand a few times, beginning at the fingers. Then wrap it around the hand between the thumb and index finger, ending by circling the wrist several times. · If your doctor gave you a splint or brace, wear it as directed to protect your wrist until it has healed. · Take pain medicines exactly as directed. ? If the doctor gave you a prescription medicine for pain, take it as prescribed. ?  If you are not taking a prescription pain medicine, ask your doctor if you can take an over-the-counter medicine. · Try not to use your injured wrist and hand. When should you call for help? Call your doctor now or seek immediate medical care if:    · Your hand or fingers are cool or pale or change color.    Watch closely for changes in your health, and be sure to contact your doctor if:    · Your pain gets worse.     · Your wrist has not improved after 1 week. Where can you learn more? Go to http://marcelle-yvonne.info/. Enter G541 in the search box to learn more about \"Wrist Sprain: Care Instructions. \"  Current as of: September 20, 2018  Content Version: 11.9  © 6313-8302 [employer]. Care instructions adapted under license by [employer] (which disclaims liability or warranty for this information). If you have questions about a medical condition or this instruction, always ask your healthcare professional. [place] [address] any warranty or liability for your use of this information. Wrist Sprain: Rehab Exercises  Your Care Instructions  Here are some examples of typical rehabilitation exercises for your condition. Start each exercise slowly. Ease off the exercise if you start to have pain. Your doctor or your physical or occupational therapist will tell you when you can start these exercises and which ones will work best for you. How to do the exercises  Resisted wrist extension    1. Sit leaning forward with your legs slightly spread. Then place your forearm on your thigh with your affected hand and wrist in front of your knee. 2. Grasp one end of an exercise band with your palm down. Step on the other end.  3. Slowly bend your wrist upward for a count of 2. Then lower your wrist slowly to a count of 5.  4. Repeat 8 to 12 times. Resisted wrist flexion    1. Sit leaning forward with your legs slightly spread.  Then place your forearm on your thigh with your affected hand and wrist in front of your knee. 2. Grasp one end of an exercise band with your palm up. Step on the other end.  3. Slowly bend your wrist upward for a count of 2. Then lower your wrist slowly to a count of 5.  4. Repeat 8 to 12 times. Resisted radial deviation    1. Sit leaning forward with your legs slightly spread. Then place your forearm on your thigh with your affected hand and wrist in front of your knee. 2. Grasp one end of an exercise band with your hand facing toward your other thigh. Step on the other end.  3. Slowly bend your wrist upward for a count of 2. Then lower your wrist slowly to a count of 5.  4. Repeat 8 to 12 times. Resisted ulnar deviation    1. Sit leaning forward with your legs slightly spread. Then place your forearm on your thigh with your affected hand and wrist by the inside of your knee. 2. Grasp one end of an exercise band with your palm down. Step on the other end with the foot opposite the hand holding the band. 3. Slowly bend your wrist outward and toward your knee for a count of 2. Then slowly move your wrist back to the starting position to a count of 5.  4. Repeat 8 to 12 times. Resisted forearm pronation    1. Sit leaning forward with your legs slightly spread. Then place your forearm on your thigh with your affected hand and wrist in front of your knee. 2. Grasp one end of an exercise band with your palm up. Step on the other end. 3. Keeping your wrist straight, roll your palm inward toward your thigh for a count of 2. Then slowly move your wrist back to the starting position to a count of 5.  4. Repeat 8 to 12 times. Resisted supination    1. Sit leaning forward with your legs slightly spread. Then place your forearm on your thigh with your affected hand and wrist in front of your knee. 2. Grasp one end of an exercise band with your palm down. Step on the other end.   3. Keeping your wrist straight, roll your palm outward and away from your thigh for a count of 2. Then slowly move your wrist back to the starting position to a count of 5.  4. Repeat 8 to 12 times. Follow-up care is a key part of your treatment and safety. Be sure to make and go to all appointments, and call your doctor if you are having problems. It's also a good idea to know your test results and keep a list of the medicines you take. Where can you learn more? Go to http://marcelle-yvonne.info/. Enter S110 in the search box to learn more about \"Wrist Sprain: Rehab Exercises. \"  Current as of: September 20, 2018  Content Version: 11.9  © 6249-7195 [employer]. Care instructions adapted under license by [employer] (which disclaims liability or warranty for this information). If you have questions about a medical condition or this instruction, always ask your healthcare professional. [address] any warranty or liability for your use of this information.

## 2019-04-18 NOTE — PROGRESS NOTES
Arm Pain   This is a new problem. The current episode started yesterday (h/o fal;l with out stretches arm ). The problem occurs constantly. The problem has not changed since onset. Associated symptoms comments: Rt wrist pain- diffuse arm soreness- Rt . The symptoms are aggravated by bending and twisting. The symptoms are relieved by ice and rest. She has tried rest and a cold compress for the symptoms. Past Medical History:   Diagnosis Date    Asthma     Depression     Family history of skin cancer     maternal grandfather - melanoma    Headache(784.0)     PCOS (polycystic ovarian syndrome)     PTSD (post-traumatic stress disorder)     Sun-damaged skin     Tanning bed exposure         Past Surgical History:   Procedure Laterality Date    COLONOSCOPY N/A 2/4/2019    COLONOSCOPY, EGD [facility] by [facility]re [facility] at [facility] ENDOSCOPY    HX TONSIL AND ADENOIDECTOMY      HX TONSILLECTOMY      HX WISDOM TEETH EXTRACTION           Family History   Problem Relation Age of Onset    Psychiatric Disorder Father     Cancer Maternal Aunt     Heart Disease Maternal Grandfather     Hypertension Maternal Grandfather     Cancer Paternal Grandmother     Diabetes Paternal Grandmother     Hypertension Paternal Grandmother         Social History     Socioeconomic History    Marital status: SINGLE     Spouse name: Not on file    Number of children: Not on file    Years of education: Not on file    Highest education level: Not on file   Occupational History    Not on file   Social Needs    Financial resource strain: Not on file    Food insecurity:     Worry: Not on file     Inability: Not on file    Transportation needs:     Medical: Not on file     Non-medical: Not on file   Tobacco Use    Smoking status: Former Smoker    Smokeless tobacco: Never Used   Substance and Sexual Activity    Alcohol use:  Yes     Alcohol/week: 1.0 oz     Types: 2 Glasses of wine per week    Drug use: No    Sexual activity: Yes     Partners: Male   Lifestyle    Physical activity:     Days per week: Not on file     Minutes per session: Not on file    Stress: Not on file   Relationships    Social connections:     Talks on phone: Not on file     Gets together: Not on file     Attends Congregation service: Not on file     Active member of club or organization: Not on file     Attends meetings of clubs or organizations: Not on file     Relationship status: Not on file    Intimate partner violence:     Fear of current or ex partner: Not on file     Emotionally abused: Not on file     Physically abused: Not on file     Forced sexual activity: Not on file   Other Topics Concern    Not on file   Social History Narrative    ** Merged History Encounter **                     ALLERGIES: Tomato; Amoxicillin; Grass pollen-bermuda, standard; Iodine; Malt extract; Oak; Pcn [penicillins]; Ragweed; Shellfish containing products; and Yeast, dried    Review of Systems   All other systems reviewed and are negative. Vitals:    04/17/19 1927   BP: 125/77   Pulse: 94   Resp: 16   Temp: 98 °F (36.7 °C)   SpO2: 98%   Weight: 249 lb (112.9 kg)   Height: 5' 5\" (1.651 m)       Physical Exam   Musculoskeletal:        Right shoulder: Normal.        Right elbow: Normal.       Right wrist: She exhibits decreased range of motion and tenderness (mild ). She exhibits no bony tenderness, no swelling, no effusion, no crepitus and no deformity. Right hand: Normal.   Nursing note and vitals reviewed. MDM    Procedures      ICD-10-CM ICD-9-CM    1. Wrist sprain, right, initial encounter S63.501A 842.00 XR WRIST RT AP/LAT/OBL MIN 3V      CANCELED: XR WRIST LT AP/LAT/OBL MIN 3V       Use wrist splint  Motrin  Self exercise    No orders of the defined types were placed in this encounter.     Results for orders placed or performed in visit on 04/17/19   XR WRIST RT AP/LAT/OBL MIN 3V    Narrative    EXAM: XR WRIST RT AP/LAT/OBL MIN 3V    INDICATION: Right hand pain after fall. COMPARISON: None. FINDINGS: 4  views of the right wrist demonstrate no fracture or other acute  osseous or articular abnormality. The soft tissues are within normal limits. Impression    IMPRESSION: No acute abnormality. The patients condition was discussed with the patient and they understand. The patient is to follow up with primary care doctor. If signs and symptoms become worse the pt is to go to the ER. The patient is to take medications as prescribed.

## 2019-05-15 ENCOUNTER — TELEPHONE (OUTPATIENT)
Dept: PRIMARY CARE CLINIC | Age: 34
End: 2019-05-15

## 2019-05-15 NOTE — TELEPHONE ENCOUNTER
Pt would like to know is she needs boosters for measles, meningitis.  Please follow up bcn: [phone]

## 2019-05-16 NOTE — TELEPHONE ENCOUNTER
Confirmed patient id,  She is moving to go to school and will be staying in dorms.   She has appointment to come in July 29 to be seen and have titers drawn and maybe get script for meningitis

## 2019-07-29 ENCOUNTER — OFFICE VISIT (OUTPATIENT)
Dept: PRIMARY CARE CLINIC | Age: 34
End: 2019-07-29

## 2019-07-29 VITALS
SYSTOLIC BLOOD PRESSURE: 124 MMHG | HEIGHT: 65 IN | OXYGEN SATURATION: 98 % | TEMPERATURE: 98.1 F | HEART RATE: 88 BPM | RESPIRATION RATE: 18 BRPM | WEIGHT: 261.4 LBS | BODY MASS INDEX: 43.55 KG/M2 | DIASTOLIC BLOOD PRESSURE: 88 MMHG

## 2019-07-29 DIAGNOSIS — R51.9 FREQUENT HEADACHES: Primary | ICD-10-CM

## 2019-07-29 DIAGNOSIS — E66.01 OBESITY, MORBID (HCC): ICD-10-CM

## 2019-07-29 DIAGNOSIS — Z28.39 DELINQUENT IMMUNIZATION STATUS: ICD-10-CM

## 2019-07-29 DIAGNOSIS — F41.9 ANXIETY AND DEPRESSION: ICD-10-CM

## 2019-07-29 DIAGNOSIS — E28.2 PCOS (POLYCYSTIC OVARIAN SYNDROME): ICD-10-CM

## 2019-07-29 DIAGNOSIS — F32.A ANXIETY AND DEPRESSION: ICD-10-CM

## 2019-07-29 DIAGNOSIS — M67.40 GANGLION CYST: ICD-10-CM

## 2019-07-29 RX ORDER — TOPIRAMATE 50 MG/1
50 TABLET, FILM COATED ORAL 2 TIMES DAILY
Qty: 60 TAB | Refills: 2 | Status: SHIPPED | OUTPATIENT
Start: 2019-07-29 | End: 2019-10-27

## 2019-07-29 RX ORDER — METFORMIN HYDROCHLORIDE 750 MG/1
750 TABLET, EXTENDED RELEASE ORAL DAILY
Qty: 90 TAB | Refills: 0 | Status: SHIPPED | OUTPATIENT
Start: 2019-07-29 | End: 2019-10-29 | Stop reason: SDUPTHER

## 2019-07-29 NOTE — PROGRESS NOTES
Written by Artemio Siu, as dictated by Dr. Deyanira Tucker MD.    Jose Joseph is a 35 y.o. female. HPI  The patient presents today requesting titers for school. She is not fasting for labs. Patient requests MMR and meningococcal titers as she will be living in campus housing. She received a meningitis vaccine in 2004, but is not sure which vaccine. Pt would like to know if she needs a booster. Her headaches have been more frequent, which she attributes to stress. She is taking Topamax 25 mg BID. Patient has been under a lot of stress from work and because she is starting school. Previously she was prescribed Lorazepam as needed, but she has not taken it in the past 3-4 months. She has not taken Wellbutrin since 02/2019, but would like to resume taking it. Her anxiety increased when she received information from her school and learned her aunt has terminal cancer. Followed by Dr. Arnol Kelly (psychiatry). She weighs 261 lbs today and has gained weight from 249 lbs on 04/17/2019. Patient admits she has not seen Dr. Cliff Pruitt and has not been doing much to lose weight. The pt admits that she did not resume metformin or Wellbutrin after she started having stomach issues. She will not return to Dr. Cliff Pruitt. Followed by Dr. Stephanie Curiel (GI) who diagnosed her with IBS. Dr. Stephanie Curiel gave her a prescription for IBS which she takes as needed. Patient was told that her stomach polyp was benign and removed. Patient is concerned she may have a ganglion cyst distal to her R wrist. Denies pain. 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 Patient Active Problem List   Diagnosis Code    Anxiety disorder F41.9    Allergic asthma J45.909    Chronic tension-type headache, not intractable G44.229    Obesity, morbid ([facility] [address] E66.01        Current Outpatient Medications on File Prior to Visit   Medication Sig Dispense Refill    LORazepam (ATIVAN) 0.5 mg tablet Take  by mouth.       fexofenadine (ALLEGRA) 180 mg tablet Take 180 mg by mouth daily.  albuterol (PROAIR HFA) 90 mcg/actuation inhaler Take 1 Puff by inhalation every four (4) hours as needed for Wheezing. (Patient taking differently: Take 2 Puffs by inhalation every four (4) hours as needed for Wheezing.) 1 Inhaler 3    ondansetron (ZOFRAN ODT) 8 mg disintegrating tablet Take 1 Tab by mouth every eight (8) hours as needed for Nausea for up to 10 doses. 10 Tab 0    cyanocobalamin (VITAMIN B-12) 1,000 mcg tablet Take 1,000 mcg by mouth daily.  cholecalciferol (VITAMIN D3) 1,000 unit cap Take  by mouth daily.  buPROPion XL (WELLBUTRIN XL) 300 mg XL tablet TAKE 1 TABLET BY MOUTH ONCE A DAY  2    fluticasone (FLONASE) 50 mcg/actuation nasal spray 2 Sprays by Both Nostrils route daily. No current facility-administered medications on file prior to visit.         Allergies   Allergen Reactions    Tomato Itching    Amoxicillin Hives    Grass Pollen-[place], Standard Swelling    Iodine Hives    Malt Extract Unknown (comments)    Oak Unknown (comments)    Pcn [Penicillins] Unknown (comments)    Ragweed Unknown (comments)    Shellfish Containing Products Unknown (comments)    Yeast, Dried Unknown (comments)       Past Medical History:   Diagnosis Date    Asthma     Depression     Family history of skin cancer     maternal grandfather - melanoma    Headache(784.0)     PCOS (polycystic ovarian syndrome)     PTSD (post-traumatic stress disorder)     Sun-damaged skin     Tanning bed exposure        Past Surgical History:   Procedure Laterality Date    COLONOSCOPY N/A 2/4/2019    COLONOSCOPY, EGD performed by Shayy Cornejo MD at [facility] ENDOSCOPY    HX TONSIL AND ADENOIDECTOMY      HX TONSILLECTOMY      HX WISDOM TEETH EXTRACTION         Family History   Problem Relation Age of Onset    Psychiatric Disorder Father     Cancer Maternal Aunt     Heart Disease Maternal Grandfather     Hypertension Maternal Grandfather  Cancer Paternal Grandmother     Diabetes Paternal Grandmother     Hypertension Paternal Grandmother        Social History     Socioeconomic History    Marital status: SINGLE     Spouse name: Not on file    Number of children: Not on file    Years of education: Not on file    Highest education level: Not on file   Occupational History    Not on file   Social Needs    Financial resource strain: Not on file    Food insecurity:     Worry: Not on file     Inability: Not on file    Transportation needs:     Medical: Not on file     Non-medical: Not on file   Tobacco Use    Smoking status: Former Smoker    Smokeless tobacco: Never Used   Substance and Sexual Activity    Alcohol use: Yes     Alcohol/week: 1.7 standard drinks     Types: 2 Glasses of wine per week    Drug use: No    Sexual activity: Yes     Partners: Male   Lifestyle    Physical activity:     Days per week: Not on file     Minutes per session: Not on file    Stress: Not on file   Relationships    Social connections:     Talks on phone: Not on file     Gets together: Not on file     Attends Cheondoism service: Not on file     Active member of club or organization: Not on file     Attends meetings of clubs or organizations: Not on file     Relationship status: Not on file    Intimate partner violence:     Fear of current or ex partner: Not on file     Emotionally abused: Not on file     Physically abused: Not on file     Forced sexual activity: Not on file   Other Topics Concern    Not on file   Social History Narrative    ** Merged History Encounter **            Review of Systems   Constitutional: Negative for malaise/fatigue. HENT: Negative for congestion. Eyes: Negative for blurred vision and pain. Respiratory: Negative for cough and shortness of breath. Cardiovascular: Negative for chest pain and palpitations. Gastrointestinal: Negative for abdominal pain and heartburn. Genitourinary: Negative for frequency and urgency. Musculoskeletal: Negative for joint pain and myalgias. Neurological: Positive for headaches. Negative for dizziness, tingling, sensory change and weakness. Psychiatric/Behavioral: Positive for depression. Negative for memory loss and substance abuse. The patient is nervous/anxious. Visit Vitals  /88 (BP 1 Location: Left arm, BP Patient Position: Sitting)   Pulse 88   Temp 98.1 °F (36.7 °C) (Oral)   Resp 18   Ht 5' 5\" (1.651 m)   Wt 261 lb 6.4 oz (118.6 kg)   LMP 07/25/2019   SpO2 98%   BMI 43.50 kg/m²       Physical Exam   Constitutional: She is oriented to person, place, and time. She appears well-developed. No distress. Morbidly obese   HENT:   Right Ear: External ear normal.   Left Ear: External ear normal.   Eyes: Conjunctivae and EOM are normal. Right eye exhibits no discharge. Left eye exhibits no discharge. Neck: Normal range of motion. Neck supple. Cardiovascular: Normal rate and regular rhythm. Pulmonary/Chest: Effort normal and breath sounds normal. She has no wheezes. Abdominal: Soft. Bowel sounds are normal. There is no tenderness. Lymphadenopathy:     She has no cervical adenopathy. Neurological: She is alert and oriented to person, place, and time. Skin: She is not diaphoretic. Psychiatric: She has a normal mood and affect. Her behavior is normal.   Nursing note and vitals reviewed. ASSESSMENT and PLAN    ICD-10-CM ICD-9-CM    1. Frequent headaches R51 784.0 topiramate (TOPAMAX) 50 mg tablet sent to pharmacy. Topamax dosage increased to 50 mg BID. 2. Anxiety and depression F41.9 300.00 Patient will see her psychiatrist. Recommended resuming Wellbutrin. F32.9 311    3. PCOS (polycystic ovarian syndrome) E28.2 256.4 metFORMIN ER (GLUCOPHAGE XR) 750 mg tablet sent to pharmacy. Metformin  mg prescribed. 4. Obesity, morbid (HCC) E66.01 278.01 Topamax, Wellbutrin, and metformin should help with cravings.  Recommended taking OTC chromium 1 mg if she wants more assistance with cravings for carbohydrates. Encouraged diet and exercise. 5. Ganglion cyst M67.40 727.43 No treatment necessary at this time. 6. Delinquent immunization status Z28.3 V15.83 MEASLES/MUMPS/RUBELLA IMMUNITY    MMR immunity ordered. Will find out if she has received Meningitis vaccine. This plan was reviewed with the patient and patient agrees. All questions were answered. This scribe documentation was reviewed by me and accurately reflects the examination and decisions made by me. This note will not be viewable in [address]

## 2019-07-29 NOTE — PROGRESS NOTES
Visit Vitals  /88 (BP 1 Location: Left arm, BP Patient Position: Sitting)   Pulse 88   Temp 98.1 °F (36.7 °C) (Oral)   Resp 18   Ht 5' 5\" (1.651 m)   Wt 261 lb 6.4 oz (118.6 kg)   SpO2 98%   BMI 43.50 kg/m²         HM Due up to date. Patient was previously given [address] to review. 1. Have you been to the ER, urgent care clinic since your last visit? Hospitalized since your last visit? Denies     2. Have you seen or consulted any other health care providers outside of the [address] [place] [address] since your last visit? Include any pap smears or colon screening.  Dr. Shelly Pradhan- GI

## 2019-10-29 DIAGNOSIS — E28.2 PCOS (POLYCYSTIC OVARIAN SYNDROME): ICD-10-CM

## 2019-10-29 RX ORDER — METFORMIN HYDROCHLORIDE 750 MG/1
TABLET, EXTENDED RELEASE ORAL
Qty: 90 TAB | Refills: 0 | Status: SHIPPED | OUTPATIENT
Start: 2019-10-29 | End: 2020-12-01 | Stop reason: ALTCHOICE

## 2020-12-01 ENCOUNTER — OFFICE VISIT (OUTPATIENT)
Dept: PRIMARY CARE CLINIC | Age: 35
End: 2020-12-01
Payer: COMMERCIAL

## 2020-12-01 VITALS
BODY MASS INDEX: 45.72 KG/M2 | OXYGEN SATURATION: 98 % | HEART RATE: 85 BPM | SYSTOLIC BLOOD PRESSURE: 118 MMHG | RESPIRATION RATE: 14 BRPM | WEIGHT: 274.4 LBS | DIASTOLIC BLOOD PRESSURE: 86 MMHG | HEIGHT: 65 IN | TEMPERATURE: 97.5 F

## 2020-12-01 DIAGNOSIS — F41.9 ANXIETY AND DEPRESSION: ICD-10-CM

## 2020-12-01 DIAGNOSIS — F32.A ANXIETY AND DEPRESSION: ICD-10-CM

## 2020-12-01 DIAGNOSIS — R73.09 ELEVATED GLUCOSE: ICD-10-CM

## 2020-12-01 DIAGNOSIS — G43.119 INTRACTABLE MIGRAINE WITH AURA WITHOUT STATUS MIGRAINOSUS: Primary | ICD-10-CM

## 2020-12-01 DIAGNOSIS — E66.01 OBESITY, MORBID (HCC): ICD-10-CM

## 2020-12-01 LAB
ALBUMIN SERPL-MCNC: 3.6 G/DL (ref 3.5–5)
ALBUMIN/GLOB SERPL: 1.1 {RATIO} (ref 1.1–2.2)
ALP SERPL-CCNC: 69 U/L (ref 45–117)
ALT SERPL-CCNC: 23 U/L (ref 12–78)
ANION GAP SERPL CALC-SCNC: 5 MMOL/L (ref 5–15)
AST SERPL-CCNC: 13 U/L (ref 15–37)
BASOPHILS # BLD: 0.1 K/UL (ref 0–0.1)
BASOPHILS NFR BLD: 1 % (ref 0–1)
BILIRUB SERPL-MCNC: 0.2 MG/DL (ref 0.2–1)
BUN SERPL-MCNC: 16 MG/DL (ref 6–20)
BUN/CREAT SERPL: 18 (ref 12–20)
CALCIUM SERPL-MCNC: 9.4 MG/DL (ref 8.5–10.1)
CHLORIDE SERPL-SCNC: 104 MMOL/L (ref 97–108)
CO2 SERPL-SCNC: 31 MMOL/L (ref 21–32)
CREAT SERPL-MCNC: 0.88 MG/DL (ref 0.55–1.02)
DIFFERENTIAL METHOD BLD: NORMAL
EOSINOPHIL # BLD: 0.2 K/UL (ref 0–0.4)
EOSINOPHIL NFR BLD: 2 % (ref 0–7)
ERYTHROCYTE [DISTWIDTH] IN BLOOD BY AUTOMATED COUNT: 12.8 % (ref 11.5–14.5)
GLOBULIN SER CALC-MCNC: 3.4 G/DL (ref 2–4)
GLUCOSE SERPL-MCNC: 92 MG/DL (ref 65–100)
HBA1C MFR BLD HPLC: 5.3 %
HCT VFR BLD AUTO: 43.6 % (ref 35–47)
HGB BLD-MCNC: 13.9 G/DL (ref 11.5–16)
IMM GRANULOCYTES # BLD AUTO: 0 K/UL (ref 0–0.04)
IMM GRANULOCYTES NFR BLD AUTO: 0 % (ref 0–0.5)
LYMPHOCYTES # BLD: 3 K/UL (ref 0.8–3.5)
LYMPHOCYTES NFR BLD: 34 % (ref 12–49)
MCH RBC QN AUTO: 29.1 PG (ref 26–34)
MCHC RBC AUTO-ENTMCNC: 31.9 G/DL (ref 30–36.5)
MCV RBC AUTO: 91.2 FL (ref 80–99)
MONOCYTES # BLD: 0.6 K/UL (ref 0–1)
MONOCYTES NFR BLD: 7 % (ref 5–13)
NEUTS SEG # BLD: 5 K/UL (ref 1.8–8)
NEUTS SEG NFR BLD: 56 % (ref 32–75)
NRBC # BLD: 0 K/UL (ref 0–0.01)
NRBC BLD-RTO: 0 PER 100 WBC
PLATELET # BLD AUTO: 395 K/UL (ref 150–400)
PMV BLD AUTO: 10.3 FL (ref 8.9–12.9)
POTASSIUM SERPL-SCNC: 4.2 MMOL/L (ref 3.5–5.1)
PROT SERPL-MCNC: 7 G/DL (ref 6.4–8.2)
RBC # BLD AUTO: 4.78 M/UL (ref 3.8–5.2)
SODIUM SERPL-SCNC: 140 MMOL/L (ref 136–145)
WBC # BLD AUTO: 8.8 K/UL (ref 3.6–11)

## 2020-12-01 PROCEDURE — 83036 HEMOGLOBIN GLYCOSYLATED A1C: CPT | Performed by: INTERNAL MEDICINE

## 2020-12-01 PROCEDURE — 99214 OFFICE O/P EST MOD 30 MIN: CPT | Performed by: INTERNAL MEDICINE

## 2020-12-01 RX ORDER — FEXOFENADINE HYDROCHLORIDE AND PSEUDOEPHEDRINE HYDROCHLORIDE 180; 240 MG/1; MG/1
1 TABLET, FILM COATED, EXTENDED RELEASE ORAL DAILY
COMMUNITY

## 2020-12-01 RX ORDER — BUPROPION HYDROCHLORIDE 150 MG/1
150 TABLET ORAL
Qty: 30 TAB | Refills: 0 | Status: SHIPPED | OUTPATIENT
Start: 2020-12-01 | End: 2021-07-28 | Stop reason: ALTCHOICE

## 2020-12-01 RX ORDER — SUMATRIPTAN 50 MG/1
50 TABLET, FILM COATED ORAL
Qty: 10 TAB | Refills: 0 | Status: SHIPPED | OUTPATIENT
Start: 2020-12-01 | End: 2020-12-01

## 2020-12-01 NOTE — PROGRESS NOTES
Chief Complaint   Patient presents with    Eye Problem     changes    Labs    Headache     frequently    Stress     Visit Vitals  /86 (BP 1 Location: Right arm, BP Patient Position: Sitting)   Pulse 85   Temp 97.5 °F (36.4 °C) (Oral)   Resp 14   Ht 5' 5\" (1.651 m)   Wt 274 lb 6.4 oz (124.5 kg)   SpO2 98%   BMI 45.66 kg/m²     1. Have you been to the ER, urgent care clinic since your last visit? Hospitalized since your last visit? School health possible UTI test was negative    2. Have you seen or consulted any other health care providers outside of the [address] [place] [address] since your last visit? Include any pap smears or colon screening.  no

## 2020-12-01 NOTE — PROGRESS NOTES
Written by Bibiana Merrill, as dictated by Dr. Samara Phipps MD.    Usama Hassan is a 28 y.o. female. HPI  Pt presents today to discuss diabetes concerns. When she went to her optometrist this morning, he told her that her prescription had changed significantly over the past year. He explained that this is concerning and often a sign of an underlying issue such as diabetes or hypertension. She does not have many other diabetes sx, although she had urinary frequency last week. This stopped, and then she started her menstrual period, so she was wondering if it might have been hormone-related. 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 She is not taking metformin and stopped taking it at the beginning of quarantine. She has also gained weight from 261 lb on 07/29/19 to 274 lb today. She stopped using her [employer] Brigette for a while too, but now she is using it again and starting to attend meetings. She is getting frequent migraines recently, and she has tried Topamax in the past, but it has caused fogginess and memory issues. She would not like to take that. However, she tried a half tablet of her boyfriend's sumatriptan when she was feeling a migraine starting and it helped. When she gets her migraines, they come with auras, severe light and sound sensitivity. 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      Patient Active Problem List   Diagnosis Code    Anxiety disorder F41.9    Allergic asthma J45.909    [facility], [facility] E66.01        Current Outpatient Medications on File Prior to Visit   Medication Sig Dispense Refill    fexofenadine-pseudoephedrine (Allegra-D 24 Hour) 180-240 mg per tablet Take 1 Tab by mouth daily.      cyanocobalamin (VITAMIN B-12) 1,000 mcg tablet Take 1,000 mcg by mouth daily.  cholecalciferol (VITAMIN D3) 1,000 unit cap Take  by mouth daily.  LORazepam (ATIVAN) 0.5 mg tablet Take  by mouth.  fluticasone (FLONASE) 50 mcg/actuation nasal spray 2 Sprays by Both Nostrils route daily.  albuterol (PROAIR HFA) 90 mcg/actuation inhaler Take 1 Puff by inhalation every four (4) hours as needed for Wheezing. (Patient taking differently: Take 2 Puffs by inhalation every four (4) hours as needed for Wheezing.) 1 Inhaler 3    [DISCONTINUED] metFORMIN ER (GLUCOPHAGE XR) 750 mg tablet TAKE 1 TABLET BY MOUTH EVERY DAY 90 Tab 0    ondansetron (ZOFRAN ODT) 8 mg disintegrating tablet Take 1 Tab by mouth every eight (8) hours as needed for Nausea for up to 10 doses. 10 Tab 0    [DISCONTINUED] buPROPion XL (WELLBUTRIN XL) 300 mg XL tablet TAKE 1 TABLET BY MOUTH ONCE A DAY  2    fexofenadine (ALLEGRA) 180 mg tablet Take 180 mg by mouth daily. No current facility-administered medications on file prior to visit.         Allergies   Allergen Reactions    Tomato Itching    Amoxicillin Hives    Grass Pollen-[place], Standard Swelling    Iodine Hives    Malt Extract Unknown (comments)    Oak Unknown (comments)    Pcn [Penicillins] Unknown (comments)    Ragweed Unknown (comments)    Shellfish Containing Products Unknown (comments)    Yeast, Dried Unknown (comments)       Past Medical History:   Diagnosis Date    Asthma     Depression     Family history of skin cancer     maternal grandfather - melanoma    Headache(784.0)     IBS (irritable bowel syndrome) 2019    PCOS (polycystic ovarian syndrome)     PTSD (post-traumatic stress disorder)     Sun-damaged skin     Tanning bed exposure        Past Surgical History:   Procedure Laterality Date    COLONOSCOPY N/A 2/4/2019    COLONOSCOPY, [facility]GD performed by Shanika [facility]ill MD at [facility] ENDOSCOPY    HX TONSIL AND ADENOIDECTOMY      HX TONSILLECTOMY      HX WISDOM TEETH EXTRACTION         Family History   Problem Relation Age of Onset    Psychiatric Disorder Father     Cancer Maternal Aunt     Heart Disease Maternal Grandfather     Hypertension Maternal Grandfather     Cancer Paternal Grandmother     Diabetes Paternal Grandmother     Hypertension Paternal Grandmother        Social History     Socioeconomic History    Marital status: SINGLE     Spouse name: Not on file    Number of children: Not on file    Years of education: Not on file    Highest education level: Not on file   Occupational History    Not on file   Social Needs    Financial resource strain: Not on file    Food insecurity     Worry: Not on file     Inability: Not on file    Transportation needs     Medical: Not on file     Non-medical: Not on file   Tobacco Use    Smoking status: Former Smoker    Smokeless tobacco: Never Used   Substance and Sexual Activity    Alcohol use:  Yes     Alcohol/week: 1.7 standard drinks     Types: 2 Glasses of wine per week    Drug use: No    Sexual activity: Yes     Partners: Male   Lifestyle    Physical activity     Days per week: Not on file     Minutes per session: Not on file    Stress: Not on file   Relationships    Social connections     Talks on phone: Not on file     Gets together: Not on file     Attends Yazidi service: Not on file     Active member of club or organization: Not on file     Attends meetings of clubs or organizations: Not on file     Relationship status: Not on file    Intimate partner violence     Fear of current or ex partner: Not on file     Emotionally abused: Not on file     Physically abused: Not on file     Forced sexual activity: Not on file   Other Topics Concern    Not on file   Social History Narrative    ** Merged History Encounter **            Office Visit on 12/01/2020   Component Date Value Ref Range Status    Hemoglobin A1c (POC) 12/01/2020 5.3  % Final     Review of Systems Constitutional: Negative for malaise/fatigue and weight loss. HENT: Negative for congestion and sore throat. Eyes: Negative for blurred vision. +vision changes   Respiratory: Negative for cough and shortness of breath. Cardiovascular: Negative for chest pain and leg swelling. Gastrointestinal: Negative for constipation and heartburn. Genitourinary: Negative for frequency and urgency. Musculoskeletal: Negative for back pain, joint pain and myalgias. Neurological: Positive for headaches (migraines). Negative for dizziness. Psychiatric/Behavioral: Negative for depression. The patient is not nervous/anxious and does not have insomnia. Visit Vitals  /86 (BP 1 Location: Right arm, BP Patient Position: Sitting)   Pulse 85   Temp 97.5 °F (36.4 °C) (Oral)   Resp 14   Ht 5' 5\" (1.651 m)   Wt 274 lb 6.4 oz (124.5 kg)   LMP 11/26/2020   SpO2 98%   BMI 45.66 kg/m²     Physical Exam  Vitals signs and nursing note reviewed. Constitutional:       General: She is not in acute distress. Appearance: Normal appearance. She is well-developed and well-groomed. She is morbidly obese. She is not diaphoretic. HENT:      Right Ear: External ear normal.      Left Ear: External ear normal.   Eyes:      General: No scleral icterus. Right eye: No discharge. Left eye: No discharge. Extraocular Movements: Extraocular movements intact. Neck:      Musculoskeletal: Normal range of motion and neck supple. Cardiovascular:      Rate and Rhythm: Normal rate and regular rhythm. Pulmonary:      Effort: Pulmonary effort is normal.      Breath sounds: Normal breath sounds. No wheezing. Musculoskeletal:      Right lower leg: No edema. Left lower leg: No edema. Lymphadenopathy:      Cervical: No cervical adenopathy. Neurological:      Mental Status: She is alert and oriented to person, place, and time.    Psychiatric:         Mood and Affect: Mood and affect normal. Behavior: Behavior normal.       ASSESSMENT and PLAN    ICD-10-CM ICD-9-CM    1. Intractable migraine with aura without status migrainosus  G43.119 346.01 SUMAtriptan (IMITREX) 50 mg tablet sent to pharmacy. Potential side effects were discussed. I prescribed Imitrex. I instructed pt to take one tablet as soon as migraine sx begin, and to take a second tablet if the first one does not help. I warned not to take more than 2 tablets within 24 hours. 2. Elevated glucose  R73.09 790.29 AMB POC HEMOGLOBIN A1C    Her HbA1c came back normal today at 5.3. CBC WITH AUTOMATED DIFF      METABOLIC PANEL, COMPREHENSIVE    Labs drawn in office today. 3. Anxiety and depression  F41.9 300.00 buPROPion XL (WELLBUTRIN XL) 150 mg tablet sent to pharmacy. I restarted on a lower dose & will go up to 300 after a month. F32.9 311    4. Obesity, morbid ([address]  E66.01 278.01 She is working on her diet and lifestyle changes, and Wellbutrin will help with food cravings as well. Will monitor for changes or improvements. This plan was reviewed with the patient and patient agrees. All questions were answered. This scribe documentation was reviewed by me and accurately reflects the examination and decisions made by me.

## 2020-12-06 NOTE — PROGRESS NOTES
Sherri Erickson, it was nice seeing you at your recent visit. Your blood work looks great including glucose number.

## 2021-01-19 ENCOUNTER — TELEPHONE (OUTPATIENT)
Dept: PRIMARY CARE CLINIC | Age: 36
End: 2021-01-19

## 2021-01-19 ENCOUNTER — VIRTUAL VISIT (OUTPATIENT)
Dept: PRIMARY CARE CLINIC | Age: 36
End: 2021-01-19
Payer: COMMERCIAL

## 2021-01-19 DIAGNOSIS — R20.2 TINGLING OF FACE: ICD-10-CM

## 2021-01-19 DIAGNOSIS — R42 DIZZINESS: ICD-10-CM

## 2021-01-19 DIAGNOSIS — H53.8 BLURRED VISION, BILATERAL: Primary | ICD-10-CM

## 2021-01-19 DIAGNOSIS — H53.2 DIPLOPIA: ICD-10-CM

## 2021-01-19 DIAGNOSIS — G43.119 INTRACTABLE MIGRAINE WITH AURA WITHOUT STATUS MIGRAINOSUS: ICD-10-CM

## 2021-01-19 PROCEDURE — 99214 OFFICE O/P EST MOD 30 MIN: CPT | Performed by: INTERNAL MEDICINE

## 2021-01-19 NOTE — PROGRESS NOTES
Kamila Pina (: 1985) is a 28 y.o. female, established patient, here for evaluation of the following chief complaint(s):   No chief complaint on file. Written by Mulugeta Velázquez, as dictated by Dr. Alexandra Hurtado MD.    SUBJECTIVE/OBJECTIVE:  HPI  Pt presents virtually today to follow up on her recent ED visit. She had a migraine which lasted for 5 days even after she took Imitrex. The Imitrex helped with the pain from the migraine but did not relieve her blurred vision and she decided to go to the ED. Once she got to [facility] ED she had an EKG and head CT done which returned normal. The ED provider discussed with her that she will need to see a specialist. She has been having persistent double vision in addition to her blurred vision which never fully got better. She has been having a feeling of pressure across her forehead and eyes for the past few days. Patient Active Problem List   Diagnosis Code    Anxiety disorder F41.9    Allergic asthma J45.909    Obesity, morbid ([facility] [address] E66.01        Current Outpatient Medications on File Prior to Visit   Medication Sig Dispense Refill    fexofenadine-pseudoephedrine (Allegra-D 24 Hour) 180-240 mg per tablet Take 1 Tab by mouth daily.  buPROPion XL (WELLBUTRIN XL) 150 mg tablet Take 1 Tab by mouth every morning. 30 Tab 0    ondansetron (ZOFRAN ODT) 8 mg disintegrating tablet Take 1 Tab by mouth every eight (8) hours as needed for Nausea for up to 10 doses. 10 Tab 0    cyanocobalamin (VITAMIN B-12) 1,000 mcg tablet Take 1,000 mcg by mouth daily.  cholecalciferol (VITAMIN D3) 1,000 unit cap Take  by mouth daily.  LORazepam (ATIVAN) 0.5 mg tablet Take  by mouth.  fluticasone (FLONASE) 50 mcg/actuation nasal spray 2 Sprays by Both Nostrils route daily.  fexofenadine (ALLEGRA) 180 mg tablet Take 180 mg by mouth daily.       albuterol (PROAIR HFA) 90 mcg/actuation inhaler Take 1 Puff by inhalation every four (4) hours as needed for Wheezing. (Patient taking differently: Take 2 Puffs by inhalation every four (4) hours as needed for Wheezing.) 1 Inhaler 3     No current facility-administered medications on file prior to visit.         Allergies   Allergen Reactions    Tomato Itching    Amoxicillin Hives    Grass Pollen-[place], Standard Swelling    Iodine Hives    Malt Extract Unknown (comments)    Oak Unknown (comments)    Pcn [Penicillins] Unknown (comments)    Ragweed Unknown (comments)    Shellfish Containing Products Unknown (comments)    Yeast, Dried Unknown (comments)       Past Medical History:   Diagnosis Date    Asthma     Depression     Family history of skin cancer     maternal grandfather - melanoma    Headache(784.0)     IBS (irritable bowel syndrome) 2019    PCOS (polycystic ovarian syndrome)     PTSD (post-traumatic stress disorder)     Sun-damaged skin     Tanning bed exposure        Past Surgical History:   Procedure Laterality Date    COLONOSCOPY N/A 2/4/2019    COLONOSCOPY, [facility]GD performed by [facility]endiz [facility] at [facility] ENDOSCOPY    HX TONSIL AND ADENOIDECTOMY      HX TONSILLECTOMY      HX WISDOM TEETH EXTRACTION         Family History   Problem Relation Age of Onset    Psychiatric Disorder Father     Cancer Maternal Aunt     Heart Disease Maternal Grandfather     Hypertension Maternal Grandfather     Cancer Paternal Grandmother     Diabetes Paternal Grandmother     Hypertension Paternal Grandmother        Social History     Socioeconomic History    Marital status: SINGLE     Spouse name: Not on file    Number of children: Not on file    Years of education: Not on file    Highest education level: Not on file   Occupational History    Not on file   Social Needs    Financial resource strain: Not on file    Food insecurity     Worry: Not on file     Inability: Not on file    Transportation needs     Medical: Not on file     Non-medical: Not on file   Tobacco Use    Smoking status: Former Smoker    Smokeless tobacco: Never Used   Substance and Sexual Activity    Alcohol use: Yes     Alcohol/week: 1.7 standard drinks     Types: 2 Glasses of wine per week    Drug use: No    Sexual activity: Yes     Partners: Male   Lifestyle    Physical activity     Days per week: Not on file     Minutes per session: Not on file    Stress: Not on file   Relationships    Social connections     Talks on phone: Not on file     Gets together: Not on file     Attends Buddhist service: Not on file     Active member of club or organization: Not on file     Attends meetings of clubs or organizations: Not on file     Relationship status: Not on file    Intimate partner violence     Fear of current or ex partner: Not on file     Emotionally abused: Not on file     Physically abused: Not on file     Forced sexual activity: Not on file   Other Topics Concern    Not on file   Social History Narrative    ** Merged History Encounter **            Orders Only on 12/01/2020   Component Date Value Ref Range Status    WBC 12/01/2020 8.8  3.6 - 11.0 K/uL Final    RBC 12/01/2020 4.78  3.80 - 5.20 M/uL Final    HGB 12/01/2020 13.9  11.5 - 16.0 g/dL Final    HCT 12/01/2020 43.6  35.0 - 47.0 % Final    MCV 12/01/2020 91.2  80.0 - 99.0 FL Final    MCH 12/01/2020 29.1  26.0 - 34.0 PG Final    MCHC 12/01/2020 31.9  30.0 - 36.5 g/dL Final    RDW 12/01/2020 12.8  11.5 - 14.5 % Final    PLATELET 94/11/3683 518  150 - 400 K/uL Final    MPV 12/01/2020 10.3  8.9 - 12.9 FL Final    NRBC 12/01/2020 0.0  0  WBC Final    ABSOLUTE NRBC 12/01/2020 0.00  0.00 - 0.01 K/uL Final    NEUTROPHILS 12/01/2020 56  32 - 75 % Final    LYMPHOCYTES 12/01/2020 34  12 - 49 % Final    MONOCYTES 12/01/2020 7  5 - 13 % Final    EOSINOPHILS 12/01/2020 2  0 - 7 % Final    BASOPHILS 12/01/2020 1  0 - 1 % Final    IMMATURE GRANULOCYTES 12/01/2020 0  0.0 - 0.5 % Final    ABS.  NEUTROPHILS 12/01/2020 5.0  1.8 - 8.0 K/UL Final    ABS. LYMPHOCYTES 12/01/2020 3.0  0.8 - 3.5 K/UL Final    ABS. MONOCYTES 12/01/2020 0.6  0.0 - 1.0 K/UL Final    ABS. EOSINOPHILS 12/01/2020 0.2  0.0 - 0.4 K/UL Final    ABS. BASOPHILS 12/01/2020 0.1  0.0 - 0.1 K/UL Final    ABS. IMM. GRANS. 12/01/2020 0.0  0.00 - 0.04 K/UL Final    DF 12/01/2020 AUTOMATED    Final    Sodium 12/01/2020 140  136 - 145 mmol/L Final    Potassium 12/01/2020 4.2  3.5 - 5.1 mmol/L Final    Chloride 12/01/2020 104  97 - 108 mmol/L Final    CO2 12/01/2020 31  21 - 32 mmol/L Final    Anion gap 12/01/2020 5  5 - 15 mmol/L Final    Glucose 12/01/2020 92  65 - 100 mg/dL Final    BUN 12/01/2020 16  6 - 20 MG/DL Final    Creatinine 12/01/2020 0.88  0.55 - 1.02 MG/DL Final    BUN/Creatinine ratio 12/01/2020 18  12 - 20   Final    GFR est AA 12/01/2020 >60  >60 ml/min/1.73m2 Final    GFR est non-AA 12/01/2020 >60  >60 ml/min/1.73m2 Final    Comment: Estimated GFR is calculated using the IDMS-traceable Modification of Diet in  Renal Disease (MDRD) Study equation, reported for both  Americans  (GFRAA) and non- Americans (GFRNA), and normalized to 1.73m2 body  surface area. The physician must decide which value applies to the patient.  Calcium 12/01/2020 9.4  8.5 - 10.1 MG/DL Final    Bilirubin, total 12/01/2020 0.2  0.2 - 1.0 MG/DL Final    ALT (SGPT) 12/01/2020 23  12 - 78 U/L Final    AST (SGOT) 12/01/2020 13* 15 - 37 U/L Final    Alk. phosphatase 12/01/2020 69  45 - 117 U/L Final    Protein, total 12/01/2020 7.0  6.4 - 8.2 g/dL Final    Albumin 12/01/2020 3.6  3.5 - 5.0 g/dL Final    Globulin 12/01/2020 3.4  2.0 - 4.0 g/dL Final    A-G Ratio 12/01/2020 1.1  1.1 - 2.2   Final   Office Visit on 12/01/2020   Component Date Value Ref Range Status    Hemoglobin A1c (POC) 12/01/2020 5.3  % Final     Review of Systems   Constitutional: Negative for activity change, fatigue and unexpected weight change.    HENT: Negative for congestion, hearing loss, rhinorrhea and sore throat. Eyes: Positive for visual disturbance (blurred vision; diplopia). Negative for discharge. Respiratory: Negative for cough, chest tightness and shortness of breath. Cardiovascular: Negative for leg swelling. Gastrointestinal: Negative for abdominal pain, constipation and diarrhea. Genitourinary: Negative for dysuria, flank pain, frequency and urgency. Musculoskeletal: Negative for arthralgias, back pain and myalgias. Skin: Negative for color change and rash. Neurological: Positive for dizziness. Negative for light-headedness and headaches. Psychiatric/Behavioral: Negative for dysphoric mood and sleep disturbance. The patient is not nervous/anxious.          Patient-Reported Vitals 1/19/2021   Patient-Reported Weight 270   Patient-Reported Height 55.5   Patient-Reported Systolic  562   Patient-Reported Diastolic 88   Patient-Reported LMP 1/19/2020       Physical Exam    [INSTRUCTIONS:  \"[x]\" Indicates a positive item  \"[]\" Indicates a negative item  -- DELETE ALL ITEMS NOT EXAMINED]    Constitutional: [x] Appears well-developed and well-nourished [x] No apparent distress      [] Abnormal -     Mental status: [x] Alert and awake  [x] Oriented to person/place/time [x] Able to follow commands    [] Abnormal -     Eyes:   EOM    [x]  Normal    [] Abnormal -   Sclera  [x]  Normal    [] Abnormal -          Discharge [x]  None visible   [] Abnormal -     HENT: [x] Normocephalic, atraumatic  [] Abnormal -   [x] Mouth/Throat: Mucous membranes are moist    External Ears [x] Normal  [] Abnormal -    Neck: [x] No visualized mass [] Abnormal -     Pulmonary/Chest: [x] Respiratory effort normal   [x] No visualized signs of difficulty breathing or respiratory distress        [] Abnormal -      Musculoskeletal:   [x] Normal gait with no signs of ataxia         [x] Normal range of motion of neck        [] Abnormal -     Neurological:        [x] No Facial Asymmetry (Cranial nerve 7 motor function) (limited exam due to video visit)          [x] No gaze palsy        [] Abnormal -          Skin:        [x] No significant exanthematous lesions or discoloration noted on facial skin         [] Abnormal -            Psychiatric:       [x] Normal Affect [] Abnormal -        [x] No Hallucinations    Other pertinent observable physical exam findings:-    ASSESSMENT/PLAN:  1. Blurred vision, bilateral  -     REFERRAL TO NEUROLOGY  -     MRI BRAIN W WO CONT; Future   I provided a referral to neuro-ophthalmology and instructed her to call [phone] to schedule an appt. I ordered a brain MRI and instructed her to call [phone] to schedule it. 2. Diplopia  -     REFERRAL TO NEUROLOGY provided. -     MRI BRAIN W WO CONT; Future ordered. We discussed that her lasting double vision will need to be evaluated with further imaging, and I also want her to see a neuro-ophthalmologist.     3. Intractable migraine with aura without status migrainosus  After being seen in the ED she needs further evaluation for her migraines and treatment. 4. Tingling of face  -     REFERRAL TO NEUROLOGY  -     MRI BRAIN W WO CONT; Future  I provided a referral to neuro-ophthalmology and ordered an MRI. 5. Dizziness  -     REFERRAL TO NEUROLOGY  -     MRI BRAIN W WO CONT; Future  I provided a referral to neuro-ophthalmology and ordered an MRI. This plan was reviewed with the patient and patient agrees. All questions were answered. This scribe documentation was reviewed by me and accurately reflects the examination and decisions made by me. Lise Guzmán is being evaluated by a Virtual Visit (video visit) encounter to address concerns as mentioned above. Due to this being a TeleHealth encounter (During ZQVST-56 public health emergency), evaluation of the following organ systems was limited: Vitals/Constitutional/EENT/Resp/CV/GI//MS/Neuro/Skin/Heme-Lymph-Imm.   Pursuant to the emergency declaration under the [facility] [address] waiver authority and the [employer] and [employer] Act, this Virtual Visit was conducted with patient's (and/or legal guardian's) consent, to reduce the patient's risk of exposure to COVID-19 and provide necessary medical care. The patient (and/or legal guardian) has also been advised to contact this office for worsening conditions or problems, and seek emergency medical treatment and/or call 911 if deemed necessary. Patient identification was verified at the start of the visit: YES    Services were provided through a video synchronous discussion virtually to substitute for in-person clinic visit. Patient was located at home and provider was located in office. An electronic signature was used to authenticate this note.   -- Beata Rock

## 2021-01-19 NOTE — TELEPHONE ENCOUNTER
Called patient and scheduled VV with Dr. El Woodson today at 3:45pm.  Patient needed to follow up with provider after recent ER visit and she needed a referral.

## 2021-01-19 NOTE — TELEPHONE ENCOUNTER
Pt would like a referral for a neuroophthalmologist. She is wanting to get in at the [address] Misael King can call back if she need. She is in class until noon today. She went to ER over the weekend bc she has had double vision for awhile now and they want her to get into see somebody.   Y:[phone]  [address] [place] [address]

## 2021-02-05 ENCOUNTER — OFFICE VISIT (OUTPATIENT)
Dept: NEUROLOGY | Age: 36
End: 2021-02-05
Payer: COMMERCIAL

## 2021-02-05 VITALS
HEART RATE: 91 BPM | WEIGHT: 271 LBS | OXYGEN SATURATION: 97 % | SYSTOLIC BLOOD PRESSURE: 138 MMHG | RESPIRATION RATE: 17 BRPM | TEMPERATURE: 97.3 F | DIASTOLIC BLOOD PRESSURE: 80 MMHG | BODY MASS INDEX: 45.1 KG/M2

## 2021-02-05 DIAGNOSIS — R51.9 MIXED HEADACHE: Primary | ICD-10-CM

## 2021-02-05 PROCEDURE — 99204 OFFICE O/P NEW MOD 45 MIN: CPT | Performed by: SPECIALIST

## 2021-02-05 RX ORDER — SUMATRIPTAN 50 MG/1
50 TABLET, FILM COATED ORAL
COMMUNITY
End: 2021-04-18 | Stop reason: SDUPTHER

## 2021-02-05 NOTE — PROGRESS NOTES
Neurology Consult      Subjective:      Naomy Beth is a 35 y.o. female who comes in today with the following history.  She says since November she started having blurry vision and dizziness like features that would come and go.  She would get a migraine-like visual aura for minutes about 80% of the time and 20% of the time she would have the aura without headaches.  She says there is a type I migraine headache that is very intense and can last days and over-the-counter remedies do not seem to help.  Imitrex has helped by degrees.  Tried Topamax as a preventative I presume, but caused some memory issues.  The second headache is a minor headache and she calls it more of a tension type and usually easily relieved with over-the-counter remedies.  The migraine headaches are described as either being left hemicranial or bifrontal throbbing intense with nausea and occasional nonprojectile vomiting.  Enhanced by light and noise and diminished by being in the dark.  She finds that being quiet and in the dark helps but the headache can last for days.  She says by experience more confident attribute of her migraines are colored lights and believes they are in both eyes although she is never done an independent eye cover, but I suggested she do otherwise.  The other visual aberrations as I understand it are not positive in terms of color in such and tend to be not affiliated with her headaches.  Again she thinks it is a binocular phenomena but is never done independent eye cover but again I suggested she check it out otherwise.  Family history negative for the same.  She says there is lots of stress and she is in law school.  Is aware of of snoring tendency but she cannot better qualify that.  I told her if there is any trending or further details that suggest a more problematic quality I would be more than glad to send her to sleep medicine and rule out sleep disordered breathing as an obstructive sleep apnea.  Is  currently set up to had a brain MRI with and without contrast I believe at the [address] [place] [address] Drive with [address]. Please see my impression and plans of action with the brain MRV. Has had labs including BMP CBC TSH and head CT normal.  An important component part of all the story line is that she saw her optometrist somewhere in November or thereabouts and under dilated conditions everything was good, although she did have a significant change of refraction? Current Outpatient Medications   Medication Sig Dispense Refill    SUMAtriptan (IMITREX) 50 mg tablet Take 50 mg by mouth once as needed for Migraine.  topiramate ER (Trokendi XR) 25 mg capsule Take 1 Cap by mouth daily. Indications: migraine prevention 30 Cap 4    fexofenadine-pseudoephedrine (Allegra-D 24 Hour) 180-240 mg per tablet Take 1 Tab by mouth daily.  cyanocobalamin (VITAMIN B-12) 1,000 mcg tablet Take 1,000 mcg by mouth daily.  LORazepam (ATIVAN) 0.5 mg tablet Take  by mouth.  fluticasone (FLONASE) 50 mcg/actuation nasal spray 2 Sprays by Both Nostrils route daily.  albuterol (PROAIR HFA) 90 mcg/actuation inhaler Take 1 Puff by inhalation every four (4) hours as needed for Wheezing. (Patient taking differently: Take 2 Puffs by inhalation every four (4) hours as needed for Wheezing.) 1 Inhaler 3    buPROPion XL (WELLBUTRIN XL) 150 mg tablet Take 1 Tab by mouth every morning. 30 Tab 0    ondansetron (ZOFRAN ODT) 8 mg disintegrating tablet Take 1 Tab by mouth every eight (8) hours as needed for Nausea for up to 10 doses. 10 Tab 0    cholecalciferol (VITAMIN D3) 1,000 unit cap Take  by mouth daily.  fexofenadine (ALLEGRA) 180 mg tablet Take 180 mg by mouth daily.         Allergies   Allergen Reactions    Tomato Itching    Amoxicillin Hives    Grass Pollen-[place], Standard Swelling    Iodine Hives    Malt Extract Unknown (comments)    Oak Unknown (comments)    Pcn [Penicillins] Unknown (comments)    Ragweed Unknown (comments)    Shellfish Containing Products Unknown (comments)    Yeast, Dried Unknown (comments)     Past Medical History:   Diagnosis Date    Asthma     Depression     Family history of skin cancer     maternal grandfather - melanoma    Headache(784.0)     IBS (irritable bowel syndrome) 2019    PCOS (polycystic ovarian syndrome)     PTSD (post-traumatic stress disorder)     Sun-damaged skin     Tanning bed exposure       Past Surgical History:   Procedure Laterality Date    COLONOSCOPY N/A 2/4/2019    COLONOSCOPY, [facility] by Seth [facility]rejon MD at [facility] ENDOSCOPY    HX TONSIL AND ADENOIDECTOMY      HX TONSILLECTOMY      HX WISDOM TEETH EXTRACTION        Social History     Socioeconomic History    Marital status: SINGLE     Spouse name: Not on file    Number of children: Not on file    Years of education: Not on file    Highest education level: Not on file   Occupational History    Not on file   Social Needs    Financial resource strain: Not on file    Food insecurity     Worry: Not on file     Inability: Not on file    Transportation needs     Medical: Not on file     Non-medical: Not on file   Tobacco Use    Smoking status: Former Smoker    Smokeless tobacco: Never Used   Substance and Sexual Activity    Alcohol use:  Yes     Alcohol/week: 1.7 standard drinks     Types: 2 Glasses of wine per week    Drug use: No    Sexual activity: Yes     Partners: Male   Lifestyle    Physical activity     Days per week: Not on file     Minutes per session: Not on file    Stress: Not on file   Relationships    Social connections     Talks on phone: Not on file     Gets together: Not on file     Attends Buddhism service: Not on file     Active member of club or organization: Not on file     Attends meetings of clubs or organizations: Not on file     Relationship status: Not on file    Intimate partner violence     Fear of current or ex partner: Not on file     Emotionally abused: Not on file     Physically abused: Not on file     Forced sexual activity: Not on file   Other Topics Concern    Not on file   Social History Narrative    ** Merged History Encounter **           Family History   Problem Relation Age of Onset    Psychiatric Disorder Father     Cancer Maternal Aunt     Heart Disease Maternal Grandfather     Hypertension Maternal Grandfather     Cancer Paternal Grandmother     Diabetes Paternal Grandmother     Hypertension Paternal Grandmother       Visit Vitals  /80 (BP 1 Location: Left upper arm, BP Patient Position: Sitting, BP Cuff Size: Adult)   Pulse 91   Temp 97.3 °F (36.3 °C)   Resp 17   Wt 122.9 kg (271 lb)   SpO2 97%   BMI 45.10 kg/m²        Review of Systems:   A comprehensive review of systems was negative except for that written in the HPI. Neuro Exam:     Appearance: The patient is well developed, well nourished, provides a coherent history and is in no acute distress. Mental Status: Oriented to time, place and person. Mood and affect appropriate. Cranial Nerves:   Intact visual fields. Fundi are benign. AARTI, EOM's full, no nystagmus, no ptosis. Facial sensation is normal. Corneal reflexes are intact. Facial movement is symmetric. Hearing is normal bilaterally. Palate is midline with normal sternocleidomastoid and trapezius muscles are normal. Tongue is midline. Visual acuity near with correction 20/20 OU slightly hesitant left eye. APD negative. Motor:  5/5 strength in upper and lower proximal and distal muscles. Normal bulk and tone. No fasciculations. Reflexes:   Deep tendon reflexes 2+/4 and symmetrical.   Sensory:   Normal to touch, pinprick and vibration. Gait:  Normal gait. Tremor:   No tremor noted. Cerebellar:  No cerebellar signs present. Neurovascular:  Normal heart sounds and regular rhythm, peripheral pulses intact, and no carotid bruits. Assessment:   Mixed headache.   This is my diagnosis accounting for certainly migraine-like qualities and characteristics as I know them on first encounter. It also leaves the door open for other possibilities depending on what imaging brings to the forefront including the brain MRV I ordered today. We will use Trokendi, a sustained release topiramate and see if we can get around the memory issues with the original product Topamax. We will start off on low dose and go from there. Imitrex is a good rescue drug and can be used with over-the-counter remedies at moment 0 for potentially a more efficient and successful approach. Further suggestions could follow. Plan:   Revisit in about 6 weeks.   Signed by :  Arden Don MD

## 2021-02-05 NOTE — LETTER
2/5/2021 Patient: Caitlin Welsh YOB: 1985 Date of Visit: 2/5/2021 Lawson Rutledge MD 
[address] [place] [id number] [address] [employer] Dear Lawson Rutledge MD, Thank you for referring Ms. Alexander Michaud to [facility] for evaluation. My notes for this consultation are attached. If you have questions, please do not hesitate to call me. I look forward to following your patient along with you.  
 
 
Sincerely, 
 
Pardeep Gordon MD

## 2021-02-05 NOTE — PATIENT INSTRUCTIONS
Patient history reviewed patient examined. An interesting case and would like to make appropriate interventions on her headache as documented. Will use Trokendi sustained release form of Topamax and hopefully that will be more user-friendly than the original immediate release Topamax. Imitrex is a good product and can be used at moment 0 with an over-the-counter Aleve ibuprofen Tylenol for potentially a better combination effect. We will add a brain MRV to the existing order for brain MRI and could be done at the same facility at the same time. If she has any notion of trending of snoring needs to let me know because we need to be checked out for obstructive sleep apnea. Further suggestions could follow.

## 2021-02-19 ENCOUNTER — HOSPITAL ENCOUNTER (OUTPATIENT)
Dept: MRI IMAGING | Age: 36
Discharge: HOME | End: 2021-02-19
Attending: SPECIALIST
Payer: COMMERCIAL

## 2021-02-19 DIAGNOSIS — R51.9 MIXED HEADACHE: ICD-10-CM

## 2021-02-19 PROCEDURE — 70544 MR ANGIOGRAPHY HEAD W/O DYE: CPT

## 2021-02-19 PROCEDURE — 70551 MRI BRAIN STEM W/O DYE: CPT

## 2021-02-22 DIAGNOSIS — H53.9 VISUAL CHANGES: Primary | ICD-10-CM

## 2021-02-26 ENCOUNTER — VIRTUAL VISIT (OUTPATIENT)
Dept: PRIMARY CARE CLINIC | Age: 36
End: 2021-02-26
Payer: COMMERCIAL

## 2021-02-26 DIAGNOSIS — J32.0 MAXILLARY SINUSITIS, UNSPECIFIED CHRONICITY: Primary | ICD-10-CM

## 2021-02-26 PROCEDURE — 99213 OFFICE O/P EST LOW 20 MIN: CPT | Performed by: NURSE PRACTITIONER

## 2021-02-26 RX ORDER — DOXYCYCLINE 100 MG/1
100 TABLET ORAL 2 TIMES DAILY
Qty: 20 TAB | Refills: 0 | Status: SHIPPED | OUTPATIENT
Start: 2021-02-26 | End: 2021-03-08

## 2021-02-26 NOTE — PROGRESS NOTES
[facility]   [address] [facility], [address] [place] [address]  P: [phone]  F: [phone]    Patrica Eng is a 28 y.o. female who is seen over telehealth for Sinus Pain x 2 weeks and stated concern for sinus infection. She has associated right ear pain with pressure, describes her mucus is green and bloody. Has had a low-grade fevers of 99.5. Has been taking OTC Allegra-D, Flonase, nasal rinses without much benefit. Patient Active Problem List    Diagnosis    Obesity, morbid ([address]    Allergic asthma    Anxiety disorder      Past Medical History:   Diagnosis Date    Asthma     Depression     Family history of skin cancer     maternal grandfather - melanoma    Headache(784.0)     IBS (irritable bowel syndrome) 2019    PCOS (polycystic ovarian syndrome)     PTSD (post-traumatic stress disorder)     Sun-damaged skin     Tanning bed exposure      Past Surgical History:   Procedure Laterality Date    COLONOSCOPY N/A 2/4/2019    COLONOSCOPY, EGD performed by John Resendiz MD at [facility] ENDOSCOPY    HX TONSIL AND ADENOIDECTOMY      HX TONSILLECTOMY      HX WISDOM TEETH EXTRACTION       Social History     Socioeconomic History    Marital status: SINGLE     Spouse name: Not on file    Number of children: Not on file    Years of education: Not on file    Highest education level: Not on file   Occupational History    Not on file   Social Needs    Financial resource strain: Not on file    Food insecurity     Worry: Not on file     Inability: Not on file    Transportation needs     Medical: Not on file     Non-medical: Not on file   Tobacco Use    Smoking status: Former Smoker    Smokeless tobacco: Never Used   Substance and Sexual Activity    Alcohol use:  Yes     Alcohol/week: 1.7 standard drinks     Types: 2 Glasses of wine per week    Drug use: No    Sexual activity: Yes     Partners: Male   Lifestyle    Physical activity     Days per week: Not on file Minutes per session: Not on file    Stress: Not on file   Relationships    Social connections     Talks on phone: Not on file     Gets together: Not on file     Attends [employer] service: Not on file     Active member of club or organization: Not on file     Attends meetings of clubs or organizations: Not on file     Relationship status: Not on file    Intimate partner violence     Fear of current or ex partner: Not on file     Emotionally abused: Not on file     Physically abused: Not on file     Forced sexual activity: Not on file   Other Topics Concern    Not on file   Social History Narrative    ** Merged History Encounter **          Family History   Problem Relation Age of Onset    Psychiatric Disorder Father     Cancer Maternal Aunt     Heart Disease Maternal Grandfather     Hypertension Maternal Grandfather     Cancer Paternal Grandmother     Diabetes Paternal Grandmother     Hypertension Paternal Grandmother      Allergies   Allergen Reactions    Tomato Itching    Amoxicillin Hives    Grass Pollen-[place], Standard Swelling    Iodine Hives    Malt Extract Unknown (comments)    Oak Unknown (comments)    Pcn [Penicillins] Unknown (comments)    Ragweed Unknown (comments)    Shellfish Containing Products Unknown (comments)    Yeast, Dried Unknown (comments)       Current Outpatient Medications   Medication Sig Dispense Refill    doxycycline (ADOXA) 100 mg tablet Take 1 Tab by mouth two (2) times a day for 10 days. 20 Tab 0    SUMAtriptan (IMITREX) 50 mg tablet Take 50 mg by mouth once as needed for Migraine.  topiramate ER (Trokendi XR) 25 mg capsule Take 1 Cap by mouth daily. Indications: migraine prevention 30 Cap 4    fexofenadine-pseudoephedrine (Allegra-D 24 Hour) 180-240 mg per tablet Take 1 Tab by mouth daily.  buPROPion XL (WELLBUTRIN XL) 150 mg tablet Take 1 Tab by mouth every morning.  30 Tab 0    ondansetron (ZOFRAN ODT) 8 mg disintegrating tablet Take 1 Tab by mouth every eight (8) hours as needed for Nausea for up to 10 doses. 10 Tab 0    cyanocobalamin (VITAMIN B-12) 1,000 mcg tablet Take 1,000 mcg by mouth daily.  cholecalciferol (VITAMIN D3) 1,000 unit cap Take  by mouth daily.  LORazepam (ATIVAN) 0.5 mg tablet Take  by mouth.  fluticasone (FLONASE) 50 mcg/actuation nasal spray 2 Sprays by Both Nostrils route daily.  fexofenadine (ALLEGRA) 180 mg tablet Take 180 mg by mouth daily.  albuterol (PROAIR HFA) 90 mcg/actuation inhaler Take 1 Puff by inhalation every four (4) hours as needed for Wheezing. (Patient taking differently: Take 2 Puffs by inhalation every four (4) hours as needed for Wheezing.) 1 Inhaler 3           The medications were reviewed and updated in the medical record. The past medical history, past surgical history, and family history were reviewed and updated in the medical record. REVIEW OF SYSTEMS   Review of Systems   Constitutional: Negative for malaise/fatigue. HENT: Positive for congestion, ear pain and sinus pain. Eyes: Negative for blurred vision and pain. Respiratory: Negative for cough and shortness of breath. Cardiovascular: Negative for chest pain and palpitations. Gastrointestinal: Negative for abdominal pain and heartburn. Genitourinary: Negative for frequency and urgency. Musculoskeletal: Negative for joint pain and myalgias. Neurological: Negative for dizziness, tingling, sensory change, weakness and headaches. Psychiatric/Behavioral: Negative for depression, memory loss and substance abuse. PHYSICAL EXAM   NO VITALS WERE TAKEN FOR THIS VISIT    Physical Exam  Constitutional:       Appearance: Normal appearance. HENT:      Head: Normocephalic and atraumatic.       Right Ear: External ear normal.      Left Ear: External ear normal.   Eyes:      Conjunctiva/sclera: Conjunctivae normal.   Pulmonary:      Effort: Pulmonary effort is normal.   Neurological:      Mental Status: She is alert and oriented to person, place, and time. Mental status is at baseline. Psychiatric:         Speech: Speech normal.         Behavior: Behavior normal. Behavior is cooperative. Thought Content: Thought content normal.       ASSESSMENT/ PLAN   Diagnoses and all orders for this visit:    1. Maxillary sinusitis, unspecified chronicity  -     doxycycline (ADOXA) 100 mg tablet; Take 1 Tab by mouth two (2) times a day for 10 days.  -Sinus Treatments:  1. Nasal rinses:  Saline rinses or salt water rinses or Neti pot  2. Anti-histamines: allegra (fenofexadine) or claritn (loratidine) or zyrtec (certizine)  3. Nasal steroids: Nasacort and Flonase (are over the counter & prescription)    I was in the office while conducting this encounter. Consent:  She and/or her healthcare decision maker is aware that this patient-initiated Telehealth encounter is a billable service, with coverage as determined by her insurance carrier. She is aware that she may receive a bill and has provided verbal consent to proceed: Yes    This virtual visit was conducted via [address] Pursuant to the emergency declaration under the [facility] waiver authority and the [employer] Act, this Virtual  Visit was conducted to reduce the patient's risk of exposure to COVID-19 and provide continuity of care for an established patient. Services were provided through a video synchronous discussion virtually to substitute for in-person clinic visit. Due to this being a TeleHealth evaluation, many elements of the physical examination are unable to be assessed. Total Time: 15 mins. Disclaimer:  Advised patient to call back or return to office if symptoms worsen/change/persist.  Discussed expected course/resolution/complications of diagnosis in detail with patient. Medication risks/benefits/alternatives discussed with patient.   Patient was given an after visit summary which includes diagnoses, current medications, & vitals. Discussed patient instructions and advised to read to all patient instructions regarding care. Patient expressed understanding with the diagnosis and plan. This note will not be viewable in [address]         Baljeet Marroquin NP  2/26/2021        (This document has been electronically signed)

## 2021-03-08 ENCOUNTER — TELEPHONE (OUTPATIENT)
Dept: PRIMARY CARE CLINIC | Age: 36
End: 2021-03-08

## 2021-03-08 ENCOUNTER — VIRTUAL VISIT (OUTPATIENT)
Dept: PRIMARY CARE CLINIC | Age: 36
End: 2021-03-08
Payer: COMMERCIAL

## 2021-03-08 DIAGNOSIS — G08 TRANSVERSE SINUS THROMBOSIS: ICD-10-CM

## 2021-03-08 DIAGNOSIS — H53.8 BLURRED VISION, BILATERAL: Primary | ICD-10-CM

## 2021-03-08 DIAGNOSIS — H53.2 DIPLOPIA: ICD-10-CM

## 2021-03-08 PROCEDURE — 99213 OFFICE O/P EST LOW 20 MIN: CPT | Performed by: INTERNAL MEDICINE

## 2021-03-08 NOTE — TELEPHONE ENCOUNTER
Called patient and she has sent the list thru my chart which was forwarded for Dr Cecilio Mccullough to see

## 2021-03-08 NOTE — PROGRESS NOTES
Nadir Bearden (: 1985) is a 28 y.o. female, established patient, here for evaluation of the following chief complaint(s):   Blurred vision    Written by Eric Carreno, as dictated by Dr. Edward Lim MD.    ASSESSMENT/PLAN:  1. Blurred vision, bilateral  She is interested in seeing a different neurologist but for the time being she should start with seeing Dr. Dejan Kearney, as Dr. Dejan Kearney can help refer her to the right surgeon. 2. Diplopia  Symptoms are better today but will get evaluated by Opthalmology     3. Transverse sinus thrombosis  -     REFERRAL TO OPHTHALMOLOGY  I referred her to Dr. Dejan Kearney and instructed her to call [phone] to schedule an appt. If she is not able to get an appt with her in the next 2 weeks, she should call me so I can talk to her myself. SUBJECTIVE/OBJECTIVE:  HPI  She is having trouble with intermittent double vision which is preventing her from getting work done at school. Even wearing glasses, her vision is not clear. 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 She does not even know if Dr. Sean Vasquez has looked at her MRV yet, and when she asked him if he had seen it, he told her to see an eye specialist.     She saw NP Nelida Cartwright virtually for sinusitis on 21 and has been feeling much better after taking doxycycline. Patient Active Problem List   Diagnosis Code    Anxiety disorder F41.9    Allergic asthma J45.909    Obesity, morbid ([facility] [address] E66.01        Current Outpatient Medications on File Prior to Visit   Medication Sig Dispense Refill    doxycycline (ADOXA) 100 mg tablet Take 1 Tab by mouth two (2) times a day for 10 days. 20 Tab 0    SUMAtriptan (IMITREX) 50 mg tablet Take 50 mg by mouth once as needed for Migraine.  topiramate ER (Trokendi XR) 25 mg capsule Take 1 Cap by mouth daily.  Indications: migraine prevention 30 Cap 4    fexofenadine-pseudoephedrine (Allegra-D 24 Hour) 180-240 mg per tablet Take 1 Tab by mouth daily.  buPROPion XL (WELLBUTRIN XL) 150 mg tablet Take 1 Tab by mouth every morning. 30 Tab 0    ondansetron (ZOFRAN ODT) 8 mg disintegrating tablet Take 1 Tab by mouth every eight (8) hours as needed for Nausea for up to 10 doses. 10 Tab 0    cyanocobalamin (VITAMIN B-12) 1,000 mcg tablet Take 1,000 mcg by mouth daily.  cholecalciferol (VITAMIN D3) 1,000 unit cap Take  by mouth daily.  LORazepam (ATIVAN) 0.5 mg tablet Take  by mouth.  fluticasone (FLONASE) 50 mcg/actuation nasal spray 2 Sprays by Both Nostrils route daily.  fexofenadine (ALLEGRA) 180 mg tablet Take 180 mg by mouth daily.  albuterol (PROAIR HFA) 90 mcg/actuation inhaler Take 1 Puff by inhalation every four (4) hours as needed for Wheezing. (Patient taking differently: Take 2 Puffs by inhalation every four (4) hours as needed for Wheezing.) 1 Inhaler 3     No current facility-administered medications on file prior to visit.         Allergies   Allergen Reactions    Tomato Itching    Amoxicillin Hives    Grass Pollen-[place], Standard Swelling    Iodine Hives    Malt Extract Unknown (comments)    Oak Unknown (comments)    Pcn [Penicillins] Unknown (comments)    Ragweed Unknown (comments)    Shellfish Containing Products Unknown (comments)    Yeast, Dried Unknown (comments)       Past Medical History:   Diagnosis Date    Asthma     Depression     Family history of skin cancer     maternal grandfather - melanoma    Headache(784.0)     IBS (irritable bowel syndrome) 2019    PCOS (polycystic ovarian syndrome)     PTSD (post-traumatic stress disorder)     Sun-damaged skin     Tanning bed exposure        Past Surgical History:   Procedure Laterality Date    COLONOSCOPY N/A 2/4/2019    COL[facility]OS[facility] by Bishop [facility]by [facility] at [facility] ENDOSCOPY    HX TONSIL AND ADENOIDECTOMY      HX TONSILLECTOMY      HX WISDOM TEETH EXTRACTION         Family History Problem Relation Age of Onset    Psychiatric Disorder Father     Cancer Maternal Aunt     Heart Disease Maternal Grandfather     Hypertension Maternal Grandfather     Cancer Paternal Grandmother     Diabetes Paternal Grandmother     Hypertension Paternal Grandmother        Social History     Socioeconomic History    Marital status: SINGLE     Spouse name: Not on file    Number of children: Not on file    Years of education: Not on file    Highest education level: Not on file   Occupational History    Not on file   Social Needs    Financial resource strain: Not on file    Food insecurity     Worry: Not on file     Inability: Not on file    Transportation needs     Medical: Not on file     Non-medical: Not on file   Tobacco Use    Smoking status: Former Smoker    Smokeless tobacco: Never Used   Substance and Sexual Activity    Alcohol use: Yes     Alcohol/week: 1.7 standard drinks     Types: 2 Glasses of wine per week    Drug use: No    Sexual activity: Yes     Partners: Male   Lifestyle    Physical activity     Days per week: Not on file     Minutes per session: Not on file    Stress: Not on file   Relationships    Social connections     Talks on phone: Not on file     Gets together: Not on file     Attends [employer] service: Not on file     Active member of club or organization: Not on file     Attends meetings of clubs or organizations: Not on file     Relationship status: Not on file    Intimate partner violence     Fear of current or ex partner: Not on file     Emotionally abused: Not on file     Physically abused: Not on file     Forced sexual activity: Not on file   Other Topics Concern    Not on file   Social History Narrative    ** Merged History Encounter **            No visits with results within 3 Month(s) from this visit.    Latest known visit with results is:   Orders Only on 12/01/2020   Component Date Value Ref Range Status    WBC 12/01/2020 8.8  3.6 - 11.0 K/uL Final    RBC 12/01/2020 4.78  3.80 - 5.20 M/uL Final    HGB 12/01/2020 13.9  11.5 - 16.0 g/dL Final    HCT 12/01/2020 43.6  35.0 - 47.0 % Final    MCV 12/01/2020 91.2  80.0 - 99.0 FL Final    MCH 12/01/2020 29.1  26.0 - 34.0 PG Final    MCHC 12/01/2020 31.9  30.0 - 36.5 g/dL Final    RDW 12/01/2020 12.8  11.5 - 14.5 % Final    PLATELET 75/78/6266 620  150 - 400 K/uL Final    MPV 12/01/2020 10.3  8.9 - 12.9 FL Final    NRBC 12/01/2020 0.0  0  WBC Final    ABSOLUTE NRBC 12/01/2020 0.00  0.00 - 0.01 K/uL Final    NEUTROPHILS 12/01/2020 56  32 - 75 % Final    LYMPHOCYTES 12/01/2020 34  12 - 49 % Final    MONOCYTES 12/01/2020 7  5 - 13 % Final    EOSINOPHILS 12/01/2020 2  0 - 7 % Final    BASOPHILS 12/01/2020 1  0 - 1 % Final    IMMATURE GRANULOCYTES 12/01/2020 0  0.0 - 0.5 % Final    ABS. NEUTROPHILS 12/01/2020 5.0  1.8 - 8.0 K/UL Final    ABS. LYMPHOCYTES 12/01/2020 3.0  0.8 - 3.5 K/UL Final    ABS. MONOCYTES 12/01/2020 0.6  0.0 - 1.0 K/UL Final    ABS. EOSINOPHILS 12/01/2020 0.2  0.0 - 0.4 K/UL Final    ABS. BASOPHILS 12/01/2020 0.1  0.0 - 0.1 K/UL Final    ABS. IMM. GRANS.  12/01/2020 0.0  0.00 - 0.04 K/UL Final    DF 12/01/2020 AUTOMATED    Final    Sodium 12/01/2020 140  136 - 145 mmol/L Final    Potassium 12/01/2020 4.2  3.5 - 5.1 mmol/L Final    Chloride 12/01/2020 104  97 - 108 mmol/L Final    CO2 12/01/2020 31  21 - 32 mmol/L Final    Anion gap 12/01/2020 5  5 - 15 mmol/L Final    Glucose 12/01/2020 92  65 - 100 mg/dL Final    BUN 12/01/2020 16  6 - 20 MG/DL Final    Creatinine 12/01/2020 0.88  0.55 - 1.02 MG/DL Final    BUN/Creatinine ratio 12/01/2020 18  12 - 20   Final    GFR est AA 12/01/2020 >60  >60 ml/min/1.73m2 Final    GFR est non-AA 12/01/2020 >60  >60 ml/min/1.73m2 Final    Comment: Estimated GFR is calculated using the IDMS-traceable Modification of Diet in  Renal Disease (MDRD) Study equation, reported for both  Americans  (GFRAA) and non- [facility]), and normalized to 1.73m2 body  surface area. The physician must decide which value applies to the patient.  Calcium 12/01/2020 9.4  8.5 - 10.1 MG/DL Final    Bilirubin, total 12/01/2020 0.2  0.2 - 1.0 MG/DL Final    ALT (SGPT) 12/01/2020 23  12 - 78 U/L Final    AST (SGOT) 12/01/2020 13* 15 - 37 U/L Final    Alk. phosphatase 12/01/2020 69  45 - 117 U/L Final    Protein, total 12/01/2020 7.0  6.4 - 8.2 g/dL Final    Albumin 12/01/2020 3.6  3.5 - 5.0 g/dL Final    Globulin 12/01/2020 3.4  2.0 - 4.0 g/dL Final    A-G Ratio 12/01/2020 1.1  1.1 - 2.2   Final     Review of Systems   Constitutional: Negative for activity change, fatigue and unexpected weight change. HENT: Negative for congestion, hearing loss, rhinorrhea and sore throat. Eyes: Positive for visual disturbance (blurred vision, diplopia). Negative for discharge. Respiratory: Negative for cough, chest tightness and shortness of breath. Cardiovascular: Negative for leg swelling. Gastrointestinal: Negative for abdominal pain, constipation and diarrhea. Genitourinary: Negative for dysuria, flank pain, frequency and urgency. Musculoskeletal: Negative for arthralgias, back pain and myalgias. Skin: Negative for color change and rash. Neurological: Negative for dizziness, light-headedness and headaches. Psychiatric/Behavioral: Negative for dysphoric mood and sleep disturbance. The patient is not nervous/anxious.          Patient-Reported Vitals 3/8/2021   Patient-Reported Weight 260   Patient-Reported Height 55.5   Patient-Reported Temperature 97.6   Patient-Reported Systolic  -   Patient-Reported Diastolic -   Patient-Reported LMP 2/24/2021       Physical Exam    [INSTRUCTIONS:  \"[x]\" Indicates a positive item  \"[]\" Indicates a negative item  -- DELETE ALL ITEMS NOT EXAMINED]    Constitutional: [x] Appears well-developed and well-nourished [x] No apparent distress      [] Abnormal -     Mental status: [x] Alert and awake [x] Oriented to person/place/time [x] Able to follow commands    [] Abnormal -     Eyes:   EOM    [x]  Normal    [] Abnormal -   Sclera  [x]  Normal    [] Abnormal -          Discharge [x]  None visible   [] Abnormal -     HENT: [x] Normocephalic, atraumatic  [] Abnormal -   [x] Mouth/Throat: Mucous membranes are moist    External Ears [x] Normal  [] Abnormal -    Neck: [x] No visualized mass [] Abnormal -     Pulmonary/Chest: [x] Respiratory effort normal   [x] No visualized signs of difficulty breathing or respiratory distress        [] Abnormal -      Musculoskeletal:   [x] Normal gait with no signs of ataxia         [x] Normal range of motion of neck        [] Abnormal -     Neurological:        [x] No Facial Asymmetry (Cranial nerve 7 motor function) (limited exam due to video visit)          [x] No gaze palsy        [] Abnormal -          Skin:        [x] No significant exanthematous lesions or discoloration noted on facial skin         [] Abnormal -            Psychiatric:       [x] Normal Affect [] Abnormal -        [x] No Hallucinations    Other pertinent observable physical exam findings:-    Lisa Byrd, was evaluated through a synchronous (real-time) audio-video encounter. The patient (or guardian if applicable) is aware that this is a billable service. Verbal consent to proceed has been obtained within the past 12 months. The visit was conducted pursuant to the emergency declaration under the [address] [place] [address], [address] [place] [address] authority and the [employer] Act. Patient identification was verified, and a caregiver was present when appropriate. The patient was located in a state where the provider was credentialed to provide care. An electronic signature was used to authenticate this note.   -- Bailey Pizarro

## 2021-03-19 ENCOUNTER — OFFICE VISIT (OUTPATIENT)
Dept: NEUROLOGY | Age: 36
End: 2021-03-19
Payer: COMMERCIAL

## 2021-03-19 VITALS
HEIGHT: 66 IN | BODY MASS INDEX: 43.07 KG/M2 | RESPIRATION RATE: 16 BRPM | HEART RATE: 95 BPM | SYSTOLIC BLOOD PRESSURE: 128 MMHG | WEIGHT: 268 LBS | TEMPERATURE: 97.3 F | DIASTOLIC BLOOD PRESSURE: 76 MMHG | OXYGEN SATURATION: 99 %

## 2021-03-19 DIAGNOSIS — G93.2 IIH (IDIOPATHIC INTRACRANIAL HYPERTENSION): Primary | ICD-10-CM

## 2021-03-19 DIAGNOSIS — H53.9 VISUAL CHANGES: Primary | ICD-10-CM

## 2021-03-19 PROCEDURE — 99213 OFFICE O/P EST LOW 20 MIN: CPT | Performed by: SPECIALIST

## 2021-03-19 RX ORDER — METHOCARBAMOL 750 MG/1
750 TABLET, FILM COATED ORAL 3 TIMES DAILY
COMMUNITY
Start: 2021-03-06 | End: 2021-04-08

## 2021-03-19 NOTE — PROGRESS NOTES
Chief Complaint   Patient presents with    Results    Headache     still haveing head pain but it is much better, the Robaxin helped with vision      Visit Vitals  /76 (BP 1 Location: Left arm, BP Patient Position: Sitting)   Pulse 95   Temp 97.3 °F (36.3 °C)   Resp 16   Ht 5' 5.5\" (1.664 m)   Wt 121.6 kg (268 lb)   SpO2 99%   BMI 43.92 kg/m²

## 2021-03-19 NOTE — PATIENT INSTRUCTIONS
Patient history reviewed patient examined. Has seen ophthalmology and there was no confident cause-and-effect relations with her history of visual changes nor change of refraction. In lieu of the history and the implied stenosis in the right transverse sinus, would like to add a lumbar puncture under fluoroscopy and check opening pressure and spinal fluid constituents. I want to make sure were not missing another part of this case that needs addressing before another referral or test.  Was warned about the possibility of a post spinal headache and understands.

## 2021-03-19 NOTE — LETTER
3/19/2021 Patient: John Martin YOB: 1985 Date of Visit: 3/19/2021 Lance Dumont MD 
[address] [place] [id number] [address] [employer] Dear Lance Dumont MD, Thank you for referring Ms. Frances Hull to [facility] for evaluation. My notes for this consultation are attached. If you have questions, please do not hesitate to call me. I look forward to following your patient along with you.  
 
 
Sincerely, 
 
Clyde Martins MD

## 2021-03-19 NOTE — PROGRESS NOTES
Neurology Consult      Subjective:      Cammie Humphrey is a 28 y.o. female who comes in today having seen ophthalmology and there was no discovery process made as to her visual changes change of refraction double vision etc.  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 Would like to formally check her opening pressure on spinal tap. Would also end up checking spinal fluid constituents of course. Understands that there is a possibility of a post spinal headache and what we would do in that case. Further suggestions could follow and she is on board with the idea and the testing. I think before we entertain formal ideas of treatment through neuro interventionalists, would like to take this next step for better framing her case. Current Outpatient Medications   Medication Sig Dispense Refill    methocarbamoL (ROBAXIN) 750 mg tablet Take 750 mg by mouth three (3) times daily.  SUMAtriptan (IMITREX) 50 mg tablet Take 50 mg by mouth once as needed for Migraine.  topiramate ER (Trokendi XR) 25 mg capsule Take 1 Cap by mouth daily. Indications: migraine prevention 30 Cap 4    fexofenadine-pseudoephedrine (Allegra-D 24 Hour) 180-240 mg per tablet Take 1 Tab by mouth daily.  buPROPion XL (WELLBUTRIN XL) 150 mg tablet Take 1 Tab by mouth every morning. 30 Tab 0    cyanocobalamin (VITAMIN B-12) 1,000 mcg tablet Take 1,000 mcg by mouth daily.  cholecalciferol (VITAMIN D3) 1,000 unit cap Take  by mouth daily.  albuterol (PROAIR HFA) 90 mcg/actuation inhaler Take 1 Puff by inhalation every four (4) hours as needed for Wheezing. (Patient taking differently: Take 2 Puffs by inhalation every four (4) hours as needed for Wheezing.) 1 Inhaler 3    ondansetron (ZOFRAN ODT) 8 mg disintegrating tablet Take 1 Tab by mouth every eight (8) hours as needed for Nausea for up to 10 doses.  10 Tab 0    LORazepam (ATIVAN) 0.5 mg tablet Take  by mouth.  fluticasone (FLONASE) 50 mcg/actuation nasal spray 2 Sprays by Both Nostrils route daily.  fexofenadine (ALLEGRA) 180 mg tablet Take 180 mg by mouth daily. Allergies   Allergen Reactions    Tomato Itching    Amoxicillin Hives    Grass Pollen-[place], Standard Swelling    Iodine Hives    Malt Extract Unknown (comments)    Oak Unknown (comments)    Pcn [Penicillins] Unknown (comments)    Ragweed Unknown (comments)    Shellfish Containing Products Unknown (comments)    Yeast, Dried Unknown (comments)     Past Medical History:   Diagnosis Date    Asthma     Depression     Family history of skin cancer     maternal grandfather - melanoma    Headache(784.0)     IBS (irritable bowel syndrome) 2019    PCOS (polycystic ovarian syndrome)     PTSD (post-traumatic stress disorder)     Sun-damaged skin     Tanning bed exposure       Past Surgical History:   Procedure Laterality Date    COLONOSCOPY N/A 2/4/2019    COLONOSCOPY, EGD performed by Sanjay Hernandez MD at [address] [place] [address] ENDOSCOPY    HX TONSIL AND ADENOIDECTOMY      HX TONSILLECTOMY      HX WISDOM TEETH EXTRACTION        Social History     Socioeconomic History    Marital status: SINGLE     Spouse name: Not on file    Number of children: Not on file    Years of education: Not on file    Highest education level: Not on file   Occupational History    Not on file   Social Needs    Financial resource strain: Not on file    Food insecurity     Worry: Not on file     Inability: Not on file    Transportation needs     Medical: Not on file     Non-medical: Not on file   Tobacco Use    Smoking status: Former Smoker    Smokeless tobacco: Never Used   Substance and Sexual Activity    Alcohol use:  Yes     Alcohol/week: 1.7 standard drinks     Types: 2 Glasses of wine per week    Drug use: No    Sexual activity: Yes     Partners: Male   Lifestyle    Physical activity     Days per week: Not on file     Minutes per session: Not on file    Stress: Not on file   Relationships    Social connections     Talks on phone: Not on file     Gets together: Not on file     Attends Orthodox service: Not on file     Active member of club or organization: Not on file     Attends meetings of clubs or organizations: Not on file     Relationship status: Not on file    Intimate partner violence     Fear of current or ex partner: Not on file     Emotionally abused: Not on file     Physically abused: Not on file     Forced sexual activity: Not on file   Other Topics Concern    Not on file   Social History Narrative    ** Merged History Encounter **           Family History   Problem Relation Age of Onset    Psychiatric Disorder Father     Cancer Maternal Aunt     Heart Disease Maternal Grandfather     Hypertension Maternal Grandfather     Cancer Paternal Grandmother     Diabetes Paternal Grandmother     Hypertension Paternal Grandmother       Visit Vitals  /76 (BP 1 Location: Left arm, BP Patient Position: Sitting)   Pulse 95   Temp 97.3 °F (36.3 °C)   Resp 16   Ht 5' 5.5\" (1.664 m)   Wt 121.6 kg (268 lb)   SpO2 99%   BMI 43.92 kg/m²        Review of Systems:   A comprehensive review of systems was negative except for that written in the HPI. Neuro Exam:     Appearance: The patient is well developed, well nourished, provides a coherent history and is in no acute distress. Mental Status: Oriented to time, place and person. Mood and affect appropriate. Cranial Nerves:   Intact visual fields. Fundi are benign. AARTI, EOM's full, no nystagmus, no ptosis. Facial sensation is normal. Corneal reflexes are intact. Facial movement is symmetric. Hearing is normal bilaterally. Palate is midline with normal sternocleidomastoid and trapezius muscles are normal. Tongue is midline. Visual acuity near with correction 20/20 OU APD negative. Motor:  5/5 strength in upper and lower proximal and distal muscles. Normal bulk and tone.  No fasciculations. Reflexes:   Deep tendon reflexes 2+/4 and symmetrical.   Sensory:   Normal to touch, pinprick and vibration. Gait:  Normal gait. Tremor:   No tremor noted. Cerebellar:  No cerebellar signs present. Neurovascular:  Normal heart sounds and regular rhythm, peripheral pulses intact, and no carotid bruits. Assessment:   Visual changes. A rather remarkable case with visual changes double vision and significant refraction encountered and otherwise unexplained. With the moderate to severe right transverse sinus stenosis would like to get an LP and check opening pressure. Understands this could cause a post spinal headache. We will see where we go with this information and decide her next steps. Plan:   Revisit pended.   Signed by :  Nilay Lieberman MD

## 2021-04-02 ENCOUNTER — HOSPITAL ENCOUNTER (OUTPATIENT)
Dept: GENERAL RADIOLOGY | Age: 36
Discharge: HOME | End: 2021-04-02
Attending: SPECIALIST
Payer: COMMERCIAL

## 2021-04-02 VITALS
DIASTOLIC BLOOD PRESSURE: 68 MMHG | SYSTOLIC BLOOD PRESSURE: 117 MMHG | OXYGEN SATURATION: 100 % | RESPIRATION RATE: 20 BRPM | TEMPERATURE: 98.2 F | HEART RATE: 74 BPM

## 2021-04-02 DIAGNOSIS — G93.2 IIH (IDIOPATHIC INTRACRANIAL HYPERTENSION): ICD-10-CM

## 2021-04-02 LAB
APPEARANCE CSF: CLEAR
APPEARANCE CSF: CLEAR
COLOR CSF: COLORLESS
COLOR CSF: COLORLESS
GLUCOSE CSF-MCNC: 58 MG/DL (ref 40–70)
PROT CSF-MCNC: 18 MG/DL (ref 15–45)
RBC # CSF: 0 /CU MM
RBC # CSF: 0 /CU MM
TUBE # CSF: 1
TUBE # CSF: 4
WBC # CSF: 0 /CU MM (ref 0–5)
WBC # CSF: 0 /CU MM (ref 0–5)

## 2021-04-02 PROCEDURE — 87205 SMEAR GRAM STAIN: CPT

## 2021-04-02 PROCEDURE — 88108 CYTOPATH CONCENTRATE TECH: CPT

## 2021-04-02 PROCEDURE — 87015 SPECIMEN INFECT AGNT CONCNTJ: CPT

## 2021-04-02 PROCEDURE — 82945 GLUCOSE OTHER FLUID: CPT

## 2021-04-02 PROCEDURE — 86617 LYME DISEASE ANTIBODY: CPT

## 2021-04-02 PROCEDURE — 89050 BODY FLUID CELL COUNT: CPT

## 2021-04-02 PROCEDURE — 74011250637 HC RX REV CODE- 250/637: Performed by: RADIOLOGY

## 2021-04-02 PROCEDURE — 77003 FLUOROGUIDE FOR SPINE INJECT: CPT

## 2021-04-02 PROCEDURE — 86592 SYPHILIS TEST NON-TREP QUAL: CPT

## 2021-04-02 PROCEDURE — 84157 ASSAY OF PROTEIN OTHER: CPT

## 2021-04-02 RX ORDER — ACETAMINOPHEN 500 MG
500 TABLET ORAL ONCE
Status: DISCONTINUED | OUTPATIENT
Start: 2021-04-02 | End: 2021-04-02

## 2021-04-02 RX ORDER — ACETAMINOPHEN 325 MG/1
650 TABLET ORAL ONCE
Status: COMPLETED | OUTPATIENT
Start: 2021-04-02 | End: 2021-04-02

## 2021-04-02 RX ADMIN — ACETAMINOPHEN 650 MG: 325 TABLET ORAL at 09:30

## 2021-04-02 NOTE — ROUTINE PROCESS
0923-Received care of pt from main xray post lumbar puncture procedure. Pt alert and complaining of lower back pain rating a 7 on 0-10 scale. VS stable, see flow sheet. 0930-Ordered received for Tylenol for pain. Tylenol 650 mg given po for lower back pain. 0935-Pt accepting coffee and crackers. Mother at bedside.

## 2021-04-02 NOTE — DISCHARGE INSTRUCTIONS
[address]  Special Procedures/Radiology Department    Radiologist:  Tayo Felder    Date: April 2, 2021    Lumbar Puncture Discharge Instructions    Remain flat in bed or on the sofa for the next 24 hours. Drink plenty of water over the next 24 to 48 hours. Avoid caffeine products. Resume your previous diet and follow the medication reconciliation form. You make take Tylenol, as directed on the label, for pain or headache. Do not take aspirin or ibuprofen (Motrin or Advil) for the next 48 hours as it may cause you to bleed. Restrict your activity for the next 24 to 48 hours. No strenuous work or activities for the next 24 to 48 hours. Follow up with your referring physician for test results. If you have any questions or concerns, call [phone] and ask to speak to the nurse on-call.

## 2021-04-02 NOTE — ROUTINE PROCESS
Pt states relief from pain. Pain now a 4 on 0-10 scale. Accepting coffee, water and crackers without difficulty. Band aid to lower back remains intact, clean and dry. No bruising, swelling or bleeding noted at the puncture site. Pt up to dress with mild discomfort. Discharge instructions given and reviewed with pt and pt voices complete understanding of all instructions given. Pt taken out via wheelchair by RN and discharged to home with mother in car.

## 2021-04-05 ENCOUNTER — HOSPITAL ENCOUNTER (EMERGENCY)
Age: 36
Discharge: HOME | End: 2021-04-06
Attending: EMERGENCY MEDICINE
Payer: COMMERCIAL

## 2021-04-05 DIAGNOSIS — G97.1 HEADACHE, POST-LUMBAR PUNCTURE: Primary | ICD-10-CM

## 2021-04-05 LAB
COMMENT, HOLDF: NORMAL
REAGIN AB CSF QL: NON REACTIVE
SAMPLES BEING HELD,HOLD: NORMAL

## 2021-04-05 PROCEDURE — 99285 EMERGENCY DEPT VISIT HI MDM: CPT

## 2021-04-05 PROCEDURE — 74011000250 HC RX REV CODE- 250: Performed by: EMERGENCY MEDICINE

## 2021-04-05 PROCEDURE — 74011250636 HC RX REV CODE- 250/636: Performed by: EMERGENCY MEDICINE

## 2021-04-05 PROCEDURE — 96374 THER/PROPH/DIAG INJ IV PUSH: CPT

## 2021-04-05 PROCEDURE — 96375 TX/PRO/DX INJ NEW DRUG ADDON: CPT

## 2021-04-05 PROCEDURE — 36415 COLL VENOUS BLD VENIPUNCTURE: CPT

## 2021-04-05 RX ORDER — SODIUM CHLORIDE 9 MG/ML
1000 INJECTION, SOLUTION INTRAVENOUS CONTINUOUS
Status: DISPENSED | OUTPATIENT
Start: 2021-04-05 | End: 2021-04-06

## 2021-04-05 RX ORDER — DEXAMETHASONE SODIUM PHOSPHATE 4 MG/ML
10 INJECTION, SOLUTION INTRA-ARTICULAR; INTRALESIONAL; INTRAMUSCULAR; INTRAVENOUS; SOFT TISSUE
Status: COMPLETED | OUTPATIENT
Start: 2021-04-05 | End: 2021-04-05

## 2021-04-05 RX ORDER — SODIUM CHLORIDE 0.9 % (FLUSH) 0.9 %
5-40 SYRINGE (ML) INJECTION AS NEEDED
Status: DISCONTINUED | OUTPATIENT
Start: 2021-04-05 | End: 2021-04-06 | Stop reason: HOSPADM

## 2021-04-05 RX ORDER — ONDANSETRON 2 MG/ML
4 INJECTION INTRAMUSCULAR; INTRAVENOUS
Status: COMPLETED | OUTPATIENT
Start: 2021-04-05 | End: 2021-04-05

## 2021-04-05 RX ORDER — HYDROMORPHONE HYDROCHLORIDE 1 MG/ML
1 INJECTION, SOLUTION INTRAMUSCULAR; INTRAVENOUS; SUBCUTANEOUS
Status: COMPLETED | OUTPATIENT
Start: 2021-04-05 | End: 2021-04-05

## 2021-04-05 RX ORDER — SODIUM CHLORIDE 0.9 % (FLUSH) 0.9 %
5-40 SYRINGE (ML) INJECTION EVERY 8 HOURS
Status: DISCONTINUED | OUTPATIENT
Start: 2021-04-05 | End: 2021-04-06 | Stop reason: HOSPADM

## 2021-04-05 RX ADMIN — DEXAMETHASONE SODIUM PHOSPHATE 10 MG: 4 INJECTION, SOLUTION INTRAMUSCULAR; INTRAVENOUS at 23:16

## 2021-04-05 RX ADMIN — HYDROMORPHONE HYDROCHLORIDE 1 MG: 1 INJECTION, SOLUTION INTRAMUSCULAR; INTRAVENOUS; SUBCUTANEOUS at 23:17

## 2021-04-05 RX ADMIN — ONDANSETRON 4 MG: 2 INJECTION INTRAMUSCULAR; INTRAVENOUS at 23:16

## 2021-04-05 RX ADMIN — CAFFEINE AND SODIUM BENZOATE 500 MG: 125 INJECTION, SOLUTION INTRAMUSCULAR; INTRAVENOUS at 23:17

## 2021-04-06 VITALS
HEART RATE: 84 BPM | WEIGHT: 270 LBS | HEIGHT: 66 IN | TEMPERATURE: 98.8 F | RESPIRATION RATE: 12 BRPM | SYSTOLIC BLOOD PRESSURE: 115 MMHG | OXYGEN SATURATION: 94 % | BODY MASS INDEX: 43.39 KG/M2 | DIASTOLIC BLOOD PRESSURE: 67 MMHG

## 2021-04-06 PROCEDURE — 74011250636 HC RX REV CODE- 250/636: Performed by: EMERGENCY MEDICINE

## 2021-04-06 PROCEDURE — 96361 HYDRATE IV INFUSION ADD-ON: CPT

## 2021-04-06 PROCEDURE — 75810000124 HC INJ EPID BLD/CLOT PATCH

## 2021-04-06 PROCEDURE — 74011250637 HC RX REV CODE- 250/637: Performed by: EMERGENCY MEDICINE

## 2021-04-06 RX ORDER — OXYCODONE HYDROCHLORIDE 5 MG/1
5 TABLET ORAL
Status: COMPLETED | OUTPATIENT
Start: 2021-04-06 | End: 2021-04-06

## 2021-04-06 RX ORDER — DEXAMETHASONE 2 MG/1
4 TABLET ORAL 3 TIMES DAILY
Qty: 30 TAB | Refills: 0 | Status: SHIPPED | OUTPATIENT
Start: 2021-04-06 | End: 2021-04-11

## 2021-04-06 RX ORDER — BUTALBITAL, ACETAMINOPHEN AND CAFFEINE 50; 325; 40 MG/1; MG/1; MG/1
1 TABLET ORAL
Status: COMPLETED | OUTPATIENT
Start: 2021-04-06 | End: 2021-04-06

## 2021-04-06 RX ORDER — BUTALBITAL, ACETAMINOPHEN AND CAFFEINE 300; 40; 50 MG/1; MG/1; MG/1
1-2 CAPSULE ORAL
Qty: 20 CAP | Refills: 0 | Status: SHIPPED | OUTPATIENT
Start: 2021-04-06 | End: 2021-08-12

## 2021-04-06 RX ORDER — DIAZEPAM 5 MG/1
5 TABLET ORAL
Status: COMPLETED | OUTPATIENT
Start: 2021-04-06 | End: 2021-04-06

## 2021-04-06 RX ADMIN — SODIUM CHLORIDE 1000 ML: 9 INJECTION, SOLUTION INTRAVENOUS at 00:25

## 2021-04-06 RX ADMIN — OXYCODONE 5 MG: 5 TABLET ORAL at 01:15

## 2021-04-06 RX ADMIN — BUTALBITAL, ACETAMINOPHEN, AND CAFFEINE 1 TABLET: 50; 325; 40 TABLET ORAL at 01:15

## 2021-04-06 RX ADMIN — DIAZEPAM 5 MG: 5 TABLET ORAL at 06:06

## 2021-04-06 NOTE — ED NOTES
Pt presents to ed due to ha that began on Friday. Pt reports she had a LP done at this facility due to spinal stenosis. She reports that there were no complications she is aware of from the procedure but she has had a worsening HA since. She states she [facility] and given medications which helped initially but not for very long. Pt reports her HA is frontal primarily but also notes pain around her trapezius/between her shoulder blades. Pt reports no numbness unless she lays on her side and reports that side becomes numb. Pt reports intermittent blurry vision and dizziness but none currently. Pt family reports warmth to touch on pt's back. Pt denies cp, sob, v, change in bm, urinary sx.     1:25 AM : Attempted to sit pt up in bed. Pt unable to tolerate reporting increase in her pain mostly on bilat sides of her neck. MD made aware    2:12 AM: anesthesia at bedside. 2:45 AM: Bedside shift change report given to Ann Morgan RN (oncoming nurse) by William Bernard RN (offgoing nurse). Report included the following information SBAR, Kardex, ED Summary, [facility] and Recent Results.

## 2021-04-06 NOTE — ED NOTES
Dorinda Ray MD reviewed discharge instructions with the patient. The patient verbalized understanding. All questions and concerns were addressed. The patient declined a wheelchair and is discharged ambulatory in the care of family members with instructions and prescriptions in hand. Pt is alert and oriented x 4. Respirations are clear and unlabored.

## 2021-04-06 NOTE — ED PROVIDER NOTES
EMERGENCY DEPARTMENT HISTORY AND PHYSICAL EXAM      Date: 4/5/2021  Patient Name: Subhash Kamara    History of Presenting Illness     Chief Complaint   Patient presents with    Headache     PT arrives wheeled to triage with CC of HA. Patient accompanied by family who reports patient was seen in ED here for LP on Friday. Patient reports continued pain. Seen at [facility] free standing today, given IV medications for migraines. Pain has now returned. History Provided By: Patient    HPI: Subhash Kamara, 28 y.o. female presents to the ED with cc of headache. Patient states she is status post LP by radiology on Friday. At that time LP was performed with an opening pressure of 21 and closing pressure of 4. Clear CSF was obtained and sent for laboratory evaluation. Patient states the LP has been performed secondary to a sinus track issue. Patient states since Friday she has been seen at 2 urgent care clinics and was prescribed Robaxin. She states that she had been at [facility] earlier today and was given a migraine cocktail and discharged home. She states her headache is severe and rated at a 10 out of 10 with any attempts to be in the standing position. She states she has a mild headache laying flat but does worsen with sitting up as well. She states that she has had some photophobia. She states she was nausea and had a vomiting episode earlier today. She states she does have a history of migraines however this headache is not typical of her migraines. She denies any fever or chills. She denies any chest pain or shortness of breath. She denies any neck pain or back discomfort. She denies any recent loss of sensation or loss of strength. There are no other complaints, changes, or physical findings at this time. PCP: Karina Cullen MD    No current facility-administered medications on file prior to encounter.       Current Outpatient Medications on File Prior to Encounter   Medication Sig Dispense Refill  methocarbamoL (ROBAXIN) 750 mg tablet Take 750 mg by mouth three (3) times daily.  SUMAtriptan (IMITREX) 50 mg tablet Take 50 mg by mouth once as needed for Migraine.  topiramate ER (Trokendi XR) 25 mg capsule Take 1 Cap by mouth daily. Indications: migraine prevention 30 Cap 4    fexofenadine-pseudoephedrine (Allegra-D 24 Hour) 180-240 mg per tablet Take 1 Tab by mouth daily.  buPROPion XL (WELLBUTRIN XL) 150 mg tablet Take 1 Tab by mouth every morning. 30 Tab 0    ondansetron (ZOFRAN ODT) 8 mg disintegrating tablet Take 1 Tab by mouth every eight (8) hours as needed for Nausea for up to 10 doses. 10 Tab 0    cyanocobalamin (VITAMIN B-12) 1,000 mcg tablet Take 1,000 mcg by mouth daily.  cholecalciferol (VITAMIN D3) 1,000 unit cap Take  by mouth daily.  LORazepam (ATIVAN) 0.5 mg tablet Take  by mouth.  fluticasone (FLONASE) 50 mcg/actuation nasal spray 2 Sprays by Both Nostrils route daily.  fexofenadine (ALLEGRA) 180 mg tablet Take 180 mg by mouth daily.  albuterol (PROAIR HFA) 90 mcg/actuation inhaler Take 1 Puff by inhalation every four (4) hours as needed for Wheezing.  (Patient taking differently: Take 2 Puffs by inhalation every four (4) hours as needed for Wheezing.) 1 Inhaler 3       Past History     Past Medical History:  Past Medical History:   Diagnosis Date    Asthma     Depression     Family history of skin cancer     maternal grandfather - melanoma    Headache(784.0)     IBS (irritable bowel syndrome) 2019    PCOS (polycystic ovarian syndrome)     PTSD (post-traumatic stress disorder)     Sun-damaged skin     Tanning bed exposure        Past Surgical History:  Past Surgical History:   Procedure Laterality Date    COLONOSCOPY N/A 2/4/2019    COLONOSCOPY, EGD performed by Domonique Rosenberg MD at [facility] ENDOSCOPY    HX TONSIL AND ADENOIDECTOMY      HX TONSILLECTOMY      HX WISDOM TEETH EXTRACTION         Family History:  Family History   Problem Relation Age of Onset    Psychiatric Disorder Father     Cancer Maternal Aunt     Heart Disease Maternal Grandfather     Hypertension Maternal Grandfather     Cancer Paternal Grandmother     Diabetes Paternal Grandmother     Hypertension Paternal Grandmother        Social History:  Social History     Tobacco Use    Smoking status: Former Smoker    Smokeless tobacco: Never Used   Substance Use Topics    Alcohol use: Yes     Alcohol/week: 1.7 standard drinks     Types: 2 Glasses of wine per week    Drug use: No       Allergies: Allergies   Allergen Reactions    Tomato Itching    Amoxicillin Hives    Grass Pollen-[place], Standard Swelling    Iodine Hives    Malt Extract Unknown (comments)    Oak Unknown (comments)    Pcn [Penicillins] Unknown (comments)    Ragweed Unknown (comments)    Shellfish Containing Products Unknown (comments)    Yeast, Dried Unknown (comments)         Review of Systems   Review of Systems   Constitutional: Negative. Negative for appetite change, chills, fatigue and fever. HENT: Negative. Negative for congestion, rhinorrhea, sinus pressure and sore throat. Eyes: Positive for photophobia. Respiratory: Negative. Negative for cough, choking, chest tightness, shortness of breath and wheezing. Cardiovascular: Negative. Negative for chest pain, palpitations and leg swelling. Gastrointestinal: Positive for nausea and vomiting. Negative for abdominal pain, constipation and diarrhea. Endocrine: Negative. Genitourinary: Negative. Negative for difficulty urinating, dysuria, flank pain and urgency. Musculoskeletal: Negative. Skin: Negative. Neurological: Positive for headaches. Negative for dizziness, speech difficulty, weakness, light-headedness and numbness. Psychiatric/Behavioral: Negative. All other systems reviewed and are negative. Physical Exam   Physical Exam  Vitals signs and nursing note reviewed.    Constitutional:       General: She is not in acute distress. Appearance: She is well-developed. She is not diaphoretic. Comments: Morbidly obese, appears uncomfortable      HENT:      Head: Normocephalic and atraumatic. Mouth/Throat:      Mouth: Mucous membranes are moist.      Pharynx: No oropharyngeal exudate. Eyes:      Extraocular Movements: Extraocular movements intact. Conjunctiva/sclera: Conjunctivae normal.      Pupils: Pupils are equal, round, and reactive to light. Neck:      Musculoskeletal: Normal range of motion and neck supple. Vascular: No JVD. Trachea: No tracheal deviation. Comments: No meningismus    Cardiovascular:      Rate and Rhythm: Normal rate and regular rhythm. Heart sounds: Normal heart sounds. No murmur. Pulmonary:      Effort: Pulmonary effort is normal. No respiratory distress. Breath sounds: Normal breath sounds. No stridor. No wheezing or rales. Abdominal:      General: There is no distension. Palpations: Abdomen is soft. Tenderness: There is no abdominal tenderness. There is no guarding or rebound. Musculoskeletal: Normal range of motion. General: No tenderness. Right lower leg: No edema. Left lower leg: No edema. Skin:     General: Skin is warm and dry. Capillary Refill: Capillary refill takes less than 2 seconds. Neurological:      Mental Status: She is alert and oriented to person, place, and time. Cranial Nerves: No cranial nerve deficit.       Comments: No gross motor or sensory deficits    Psychiatric:         Mood and Affect: Mood normal.         Behavior: Behavior normal.         Diagnostic Study Results     Labs -     Recent Results (from the past 12 hour(s))   SAMPLES BEING HELD    Collection Time: 04/05/21 10:38 PM   Result Value Ref Range    SAMPLES BEING HELD bl,lav,rd,sst,pst     COMMENT        Add-on orders for these samples will be processed based on acceptable specimen integrity and analyte stability, which may vary by analyte. Radiologic Studies -   No orders to display     CT Results  (Last 48 hours)    None        CXR Results  (Last 48 hours)    None          Medical Decision Making   I am the first provider for this patient. I reviewed the vital signs, available nursing notes, past medical history, past surgical history, family history and social history. Vital Signs-Reviewed the patient's vital signs. Patient Vitals for the past 12 hrs:   Temp Pulse Resp BP SpO2   04/05/21 2215 -- 79 19 123/76 96 %   04/05/21 2115 -- 86 14 110/65 96 %   04/05/21 2058 98.9 °F (37.2 °C) (!) 112 18 (!) 138/103 94 %       Records Reviewed: Nursing Notes, Old Medical Records, Previous Radiology Studies and Previous Laboratory Studies, patient with LP on 4/2, opening pressure 21 culture Gram stain no growth, cell count showing 0 red cells 0 white cells glucose of 58 protein of 18 cytology pending VDRL negative, Lyme pending    Provider Notes (Medical Decision Making):   DDx-post LP headache, migraine, tension headache    ED Course:   Initial assessment performed. The patients presenting problems have been discussed, and they are in agreement with the care plan formulated and outlined with them. I have encouraged them to ask questions as they arise throughout their visit. Despite having been seen to urgent care clinics as well as [facility] patient had not been given any caffeine nor was told or instructed to take any caffeine at home. Patient has not had any doses of Fioricet at this time. Given the severity of the patient's symptoms will order IV fluids in addition to 500 mg of caffeine with additional fluid bolus following. Patient will also be given a dose of pain medication and steroids to prevent rebound phenomenon.     ED Course as of Apr 06 0502   Tue Apr 06, 2021   0103 Pt has had 1st liter of IV fluids as well as 500mg IV Caffeine, h/a has improved 4/10, she is on her 2nd liter IV fluids, will dose with Fioricet and then attempt to ambulate following completion of 2nd liter       []   0139 Pt still unable to sit up without sig headache. Consult:   Case discussed with anesthesia. Will come see in the ED to discuss blood patch. []      ED Course User Index  [] Clare Frank,      Pt seen by anesthesia. Blood patch performed at 2:45 AM. Pt to lay flat for 2hrs and can then be dc home. Pt with near complete resolution of her headache. Her neck discomfort has resolved. She states with sitting upright she still has a mild headache. Discussed with pt continue with fluid intake. Blood patch will help but she may continue to have mild headache today has her CSF is replaced. Will Rx Decadron to hel with rebound headache, Rx for Fioricet. Disposition:  DC home     DISCHARGE PLAN:  1. Discharge Medication List as of 4/6/2021  6:03 AM      START taking these medications    Details   dexAMETHasone (DECADRON) 2 mg tablet Take 2 Tabs by mouth three (3) times daily for 5 days. , Normal, Disp-30 Tab, R-0      butalbital-acetaminophen-caff (FIORICET) -40 mg per capsule Take 1-2 Caps by mouth every six (6) hours as needed for Headache., Normal, Disp-20 Cap, R-0         CONTINUE these medications which have NOT CHANGED    Details   methocarbamoL (ROBAXIN) 750 mg tablet Take 750 mg by mouth three (3) times daily. , Historical Med      SUMAtriptan (IMITREX) 50 mg tablet Take 50 mg by mouth once as needed for Migraine., Historical Med      topiramate ER (Trokendi XR) 25 mg capsule Take 1 Cap by mouth daily. Indications: migraine prevention, Normal, Disp-30 Cap, R-4      fexofenadine-pseudoephedrine (Allegra-D 24 Hour) 180-240 mg per tablet Take 1 Tab by mouth daily. , Historical Med      buPROPion XL (WELLBUTRIN XL) 150 mg tablet Take 1 Tab by mouth every morning., Normal, Disp-30 Tab,R-0      ondansetron (ZOFRAN ODT) 8 mg disintegrating tablet Take 1 Tab by mouth every eight (8) hours as needed for Nausea for up to 10 doses., Normal, Disp-10 Tab, R-0      cyanocobalamin (VITAMIN B-12) 1,000 mcg tablet Take 1,000 mcg by mouth daily. , Historical Med      cholecalciferol (VITAMIN D3) 1,000 unit cap Take  by mouth daily. , Historical Med      LORazepam (ATIVAN) 0.5 mg tablet Take  by mouth., Historical Med      fluticasone (FLONASE) 50 mcg/actuation nasal spray 2 Sprays by Both Nostrils route daily. , Historical Med      fexofenadine (ALLEGRA) 180 mg tablet Take 180 mg by mouth daily. , Historical Med      albuterol (PROAIR HFA) 90 mcg/actuation inhaler Take 1 Puff by inhalation every four (4) hours as needed for Wheezing., Normal, Disp-1 Inhaler, R-3           2. Follow-up Information     Follow up With Specialties Details Why Contact Info    Alivia Knox MD Internal Medicine  As needed [address] [place] [address]  [phone]          3. Return to ED if worse     Diagnosis     Clinical Impression:   1. Headache, post-lumbar puncture        Attestations:    Beba Davies, DO    Please note that this dictation was completed with [id number], the [employer] voice recognition software. Quite often unanticipated grammatical, syntax, homophones, and other interpretive errors are inadvertently transcribed by the computer software. Please disregard these errors. Please excuse any errors that have escaped final proofreading. Thank you.

## 2021-04-06 NOTE — DISCHARGE INSTRUCTIONS
Decadron prescription written to prevent rebound headache    Foricet can be use for headache the next couple of days if needed       It was a pleasure taking care of you in our Emergency Department today. We know that when you come to [facility], you are entrusting us with your health, comfort, and safety. Our physicians and nurses honor that trust, and truly appreciate the opportunity to care for you and your loved ones. We also value your feedback. If you receive a survey about your Emergency Department experience today, please fill it out. We care about our patients' feedback, and we listen to what you have to say. Thank you!

## 2021-04-06 NOTE — ED NOTES
0215: anethesiologist at bedside at this time. This nurse assisted with blood patch at this time. 0246: This nurse assumed care of pt at this time. Pt resting in bed supine with call bell within reach. 0258: purwick placed on pt at this time by this nurse. Pt states that her neck is feeling better and this is the first time in a few days that she hasn't felt pressure on her neck. 0545: Pt states she is feeling better upon sitting up.

## 2021-04-06 NOTE — PROGRESS NOTES
Epidural Blood Patch Procedure Note      Patient is a 29 y/o WF sp lumbar puncture complicated by + PDPH. Patient has subsequently developed a positional headache relieved with assuming the recumbent position. No fever or focal neurological deficits. Patient wishes to pursue aggressive treatment with epidural blood patch vs conservative therapy. Risk and benefits were discussed with the patient and plans are to proceed. Patient was placed in the seated position. The back was prepped at the lumbar region and left [facility] with betadine. 2% lidocaine was used as local at L3 - L4. A 17 Tuohy needle was passed x 1 attempt(s) at the above stated level. Loss of resistance was obtained using air and confirmed with loss of saline    15 mL of sterile blood was withdrawn and placed in the epidural space, which was then pt started having some right lower extremity pain symptoms in addition to significant back pressure leading to termination of the procedure. Afterwards the pt was laid in the supine position again and told to remain in this position for the next 2 hours. Patient was instructed to followup emergently in the nearest ER if she experiences signs and symptoms of focal neurological deficits ie. bowel / bladder / motor loss or localized tenderness / erythema at the epidural site associated with fever. Patient also instructed not do any heavy lifting for the next 36-48 hours.

## 2021-04-07 ENCOUNTER — TELEPHONE (OUTPATIENT)
Dept: NEUROLOGY | Age: 36
End: 2021-04-07

## 2021-04-07 LAB
B BURGDOR IGG PATRN CSF IB-IMP: NEGATIVE
B BURGDOR IGM PATRN CSF IB-IMP: NEGATIVE
B BURGDOR18KD IGG CSF QL IB: NORMAL
B BURGDOR23KD IGG CSF QL IB: NORMAL
B BURGDOR23KD IGM CSF QL IB: NORMAL
B BURGDOR28KD IGG CSF QL IB: NORMAL
B BURGDOR30KD IGG CSF QL IB: NORMAL
B BURGDOR39KD IGG CSF QL IB: NORMAL
B BURGDOR39KD IGM CSF QL IB: NORMAL
B BURGDOR41KD IGG CSF QL IB: NORMAL
B BURGDOR41KD IGM CSF QL IB: NORMAL
B BURGDOR45KD IGG CSF QL IB: NORMAL
B BURGDOR58KD IGG CSF QL IB: NORMAL
B BURGDOR66KD IGG CSF QL IB: NORMAL
B BURGDOR93KD IGG CSF QL IB: NORMAL

## 2021-04-07 NOTE — TELEPHONE ENCOUNTER
----- Message from Geoff Allen sent at 4/7/2021  8:43 AM EDT -----  Regarding: Dr. Marv Hagan  General Message/Vendor Calls    Caller's first and last name: Pt      Reason for call: provide update to office of recent health concerns      Callback required yes/no and why: Yes      Best contact number(s): [phone]      Details to clarify the request: Pt calling to notify the office that she was seen in the ER on Monday night and had a blood patch performed. She is scheduled to see Dr. Alee Castellanos tomorrow for a follow up. Patient is also hoping to get a note for her school stating she has been under Dr. Hair Leo care since February for an ongoing issue.        Geoff Moles

## 2021-04-08 ENCOUNTER — OFFICE VISIT (OUTPATIENT)
Dept: NEUROLOGY | Age: 36
End: 2021-04-08
Payer: COMMERCIAL

## 2021-04-08 VITALS
WEIGHT: 277.3 LBS | BODY MASS INDEX: 44.56 KG/M2 | HEART RATE: 96 BPM | HEIGHT: 66 IN | RESPIRATION RATE: 20 BRPM | OXYGEN SATURATION: 98 % | TEMPERATURE: 97.2 F

## 2021-04-08 DIAGNOSIS — G43.009 MIGRAINE WITHOUT AURA AND WITHOUT STATUS MIGRAINOSUS, NOT INTRACTABLE: Primary | ICD-10-CM

## 2021-04-08 PROCEDURE — 99214 OFFICE O/P EST MOD 30 MIN: CPT | Performed by: SPECIALIST

## 2021-04-08 RX ORDER — PROPRANOLOL HYDROCHLORIDE 60 MG/1
60 CAPSULE, EXTENDED RELEASE ORAL DAILY
Qty: 30 CAP | Refills: 3 | Status: SHIPPED | OUTPATIENT
Start: 2021-04-08 | End: 2021-06-23 | Stop reason: DRUGHIGH

## 2021-04-08 NOTE — LETTER
4/8/2021 Patient: Colleen Santos YOB: 1985 Date of Visit: 4/8/2021 Mariana Ashton MD 
[address] [place] [id number] [address] [employer] Dear Mariana Ashton MD, Thank you for referring Ms. Chano Barnhart to [facility] for evaluation. My notes for this consultation are attached. If you have questions, please do not hesitate to call me. I look forward to following your patient along with you.  
 
 
Sincerely, 
 
Reji Riggins MD

## 2021-04-08 NOTE — PROGRESS NOTES
Neurology Consult      Subjective:      Moe Beltran is a 28 y.o. female who comes in today following post LP results. Opening pressure normal as were all spinal fluid constituents. Unfortunately as fantastic as that news is she ended up with a post spinal headache that had to be treated with a blood patch at [facility] anesthesiology. Currently has a headache agenda with another frequency where will challenge with Inderal LA 60 mg and hopefully that will be a good fit. We went over potential side effects. 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 If there is any unusual trending of headache features or hard to explain symptoms we could revisit the imaging part at that time. Will suggest a revisit in 2 months. In retrospect her eye exams did not definitively support out right papilledema, but her eye symptoms and natural history had to be pursued with the testing that was accomplished. Current Outpatient Medications   Medication Sig Dispense Refill    liraglutide (VICTOZA) 0.6 mg/0.1 mL (18 mg/3 mL) pnij 0.6 mg by SubCUTAneous route.  dexAMETHasone (DECADRON) 2 mg tablet Take 2 Tabs by mouth three (3) times daily for 5 days. 30 Tab 0    butalbital-acetaminophen-caff (FIORICET) -40 mg per capsule Take 1-2 Caps by mouth every six (6) hours as needed for Headache. 20 Cap 0    SUMAtriptan (IMITREX) 50 mg tablet Take 50 mg by mouth once as needed for Migraine.  topiramate ER (Trokendi XR) 25 mg capsule Take 1 Cap by mouth daily. Indications: migraine prevention 30 Cap 4    cyanocobalamin (VITAMIN B-12) 1,000 mcg tablet Take 1,000 mcg by mouth daily.  LORazepam (ATIVAN) 0.5 mg tablet Take  by mouth.  albuterol (PROAIR HFA) 90 mcg/actuation inhaler Take 1 Puff by inhalation every four (4) hours as needed for Wheezing. (Patient taking differently: Take 2 Puffs by inhalation every four (4) hours as needed for Wheezing.) 1 Inhaler 3    methocarbamoL (ROBAXIN) 750 mg tablet Take 750 mg by mouth three (3) times daily.  fexofenadine-pseudoephedrine (Allegra-D 24 Hour) 180-240 mg per tablet Take 1 Tab by mouth daily.  buPROPion XL (WELLBUTRIN XL) 150 mg tablet Take 1 Tab by mouth every morning. 30 Tab 0    ondansetron (ZOFRAN ODT) 8 mg disintegrating tablet Take 1 Tab by mouth every eight (8) hours as needed for Nausea for up to 10 doses. 10 Tab 0    cholecalciferol (VITAMIN D3) 1,000 unit cap Take  by mouth daily.  fluticasone (FLONASE) 50 mcg/actuation nasal spray 2 Sprays by Both Nostrils route daily.  fexofenadine (ALLEGRA) 180 mg tablet Take 180 mg by mouth daily.         Allergies   Allergen Reactions    Tomato Itching    Amoxicillin Hives    Grass Pollen-[place], Standard Swelling    Iodine Hives    Malt Extract Unknown (comments)    Oak Unknown (comments)    Pcn [Penicillins] Unknown (comments)    Ragweed Unknown (comments)    Shellfish Containing Products Unknown (comments)    Yeast, Dried Unknown (comments)     Past Medical History:   Diagnosis Date    Asthma     Depression     Family history of skin cancer     maternal grandfather - melanoma    Headache(784.0)     IBS (irritable bowel syndrome) 2019    PCOS (polycystic ovarian syndrome)     PTSD (post-traumatic stress disorder)     Sun-damaged skin     Tanning bed exposure       Past Surgical History:   Procedure Laterality Date    COLONOSCOPY N/A 2/4/2019    COLONOSCOPY, EGD performed by M[facility] Rico [facility] at [facility] ENDOSCOPY    HX TONSIL AND ADENOIDECTOMY      HX TONSILLECTOMY      HX WISDOM TEETH EXTRACTION        Social History     Socioeconomic History    Marital status: SINGLE     Spouse name: Not on file    Number of children: Not on file    Years of education: Not on file    Highest education level: Not on file Occupational History    Not on file   Social Needs    Financial resource strain: Not on file    Food insecurity     Worry: Not on file     Inability: Not on file    Transportation needs     Medical: Not on file     Non-medical: Not on file   Tobacco Use    Smoking status: Former Smoker    Smokeless tobacco: Never Used   Substance and Sexual Activity    Alcohol use: Yes     Alcohol/week: 1.7 standard drinks     Types: 2 Glasses of wine per week    Drug use: No    Sexual activity: Yes     Partners: Male   Lifestyle    Physical activity     Days per week: Not on file     Minutes per session: Not on file    Stress: Not on file   Relationships    Social connections     Talks on phone: Not on file     Gets together: Not on file     Attends Protestant service: Not on file     Active member of club or organization: Not on file     Attends meetings of clubs or organizations: Not on file     Relationship status: Not on file    Intimate partner violence     Fear of current or ex partner: Not on file     Emotionally abused: Not on file     Physically abused: Not on file     Forced sexual activity: Not on file   Other Topics Concern    Not on file   Social History Narrative    ** Merged History Encounter **           Family History   Problem Relation Age of Onset    Psychiatric Disorder Father     Cancer Maternal Aunt     Heart Disease Maternal Grandfather     Hypertension Maternal Grandfather     Cancer Paternal Grandmother     Diabetes Paternal Grandmother     Hypertension Paternal Grandmother       Visit Vitals  Pulse 96   Temp 97.2 °F (36.2 °C) (Temporal)   Resp 20   Ht 5' 5.5\" (1.664 m)   Wt 125.8 kg (277 lb 4.8 oz)   LMP 03/22/2021 (Exact Date)   SpO2 98%   BMI 45.44 kg/m²        Review of Systems:   A comprehensive review of systems was negative except for that written in the HPI. Neuro Exam:     Appearance:   The patient is well developed, well nourished, provides a coherent history and is in no acute distress. Mental Status: Oriented to time, place and person. Mood and affect appropriate. Cranial Nerves:   Intact visual fields. Fundi are benign. AARTI, EOM's full, no nystagmus, no ptosis. Facial sensation is normal. Corneal reflexes are intact. Facial movement is symmetric. Hearing is normal bilaterally. Palate is midline with normal sternocleidomastoid and trapezius muscles are normal. Tongue is midline. Motor:  5/5 strength in upper and lower proximal and distal muscles. Normal bulk and tone. No fasciculations. Reflexes:   Deep tendon reflexes 2+/4 and symmetrical.   Sensory:   Normal to touch, pinprick and vibration. Gait:  Normal gait. Tremor:   No tremor noted. Cerebellar:  No cerebellar signs present. Neurovascular:  Normal heart sounds and regular rhythm, peripheral pulses intact, and no carotid bruits. Assessment:   Migraine headaches. This is my best default diagnosis based on testing to date. Will challenge with Inderal LA 60 mg daily and we talked about potential side effects. Get good sleep minimize stress and the literature says on occasion regular exercise even walking can help the cause. Plan:   Revisit 2 months.   Signed by :  Wing Hui MD

## 2021-04-08 NOTE — LETTER
4/8/2021 3:23 PM 
 
Ms. Corinne Devon [address] [facility] [address] [facility] [address] To Whom It May Concern: 
 
Corinne Devon is currently under my care at [facility] has been since 2/4/2021. Her work up for evaluation of headaches has included, but is not limited to, lab work, imaging and lumbar puncture. Your cooperation during this time is appreciated. If there are questions or concerns please have the patient contact our office.  
 
 
 
Sincerely, 
 
 
Katie Copeland MD

## 2021-04-08 NOTE — PATIENT INSTRUCTIONS
Lumbar puncture results were completely normal.  Unfortunately had a post LP headache that had to be addressed with a blood patch. Will start the drug inderal LA 60 mg as a preventative drug for migraine type headache qualities.   We will see her back in 2 months and exam today otherwise look normal.

## 2021-04-09 LAB
BACTERIA SPEC CULT: NORMAL
BACTERIA SPEC CULT: NORMAL
GRAM STN SPEC: NORMAL
GRAM STN SPEC: NORMAL
SERVICE CMNT-IMP: NORMAL

## 2021-04-18 DIAGNOSIS — G43.009 MIGRAINE WITHOUT AURA AND WITHOUT STATUS MIGRAINOSUS, NOT INTRACTABLE: Primary | ICD-10-CM

## 2021-04-18 RX ORDER — SUMATRIPTAN 50 MG/1
50 TABLET, FILM COATED ORAL
Qty: 10 TAB | Refills: 1 | Status: SHIPPED | OUTPATIENT
Start: 2021-04-18 | End: 2021-04-18

## 2021-06-23 ENCOUNTER — OFFICE VISIT (OUTPATIENT)
Dept: NEUROLOGY | Age: 36
End: 2021-06-23
Payer: COMMERCIAL

## 2021-06-23 VITALS
HEIGHT: 66 IN | RESPIRATION RATE: 18 BRPM | OXYGEN SATURATION: 94 % | BODY MASS INDEX: 44.24 KG/M2 | DIASTOLIC BLOOD PRESSURE: 80 MMHG | WEIGHT: 275.3 LBS | SYSTOLIC BLOOD PRESSURE: 106 MMHG | HEART RATE: 79 BPM

## 2021-06-23 DIAGNOSIS — G43.009 MIGRAINE WITHOUT AURA AND WITHOUT STATUS MIGRAINOSUS, NOT INTRACTABLE: Primary | ICD-10-CM

## 2021-06-23 PROCEDURE — 99213 OFFICE O/P EST LOW 20 MIN: CPT | Performed by: SPECIALIST

## 2021-06-23 RX ORDER — METFORMIN HYDROCHLORIDE 500 MG/1
1000 TABLET, EXTENDED RELEASE ORAL
COMMUNITY
Start: 2021-06-03

## 2021-06-23 RX ORDER — SUMATRIPTAN 50 MG/1
TABLET, FILM COATED ORAL
COMMUNITY
Start: 2021-05-22 | End: 2021-07-28 | Stop reason: ALTCHOICE

## 2021-06-23 RX ORDER — METHIMAZOLE 5 MG/1
TABLET ORAL
COMMUNITY
Start: 2021-04-16 | End: 2021-12-22

## 2021-06-23 RX ORDER — PROPRANOLOL HYDROCHLORIDE 80 MG/1
80 CAPSULE, EXTENDED RELEASE ORAL DAILY
Qty: 30 CAPSULE | Refills: 5 | Status: SHIPPED | OUTPATIENT
Start: 2021-06-23 | End: 2021-08-12 | Stop reason: SDUPTHER

## 2021-06-23 RX ORDER — PEN NEEDLE, DIABETIC 32 GX 1/4"
NEEDLE, DISPOSABLE MISCELLANEOUS
COMMUNITY
Start: 2021-04-03 | End: 2021-08-12

## 2021-06-23 RX ORDER — BISMUTH SUBSALICYLATE 262 MG
1 TABLET,CHEWABLE ORAL DAILY
COMMUNITY

## 2021-06-23 RX ORDER — METHOCARBAMOL 500 MG/1
TABLET, FILM COATED ORAL
COMMUNITY
Start: 2021-04-04 | End: 2021-12-22

## 2021-06-23 NOTE — LETTER
6/23/2021    Patient: Chandler Pugh   YOB: 1985   Date of Visit: 6/23/2021     Manju Bergman MD  [address] [place] [address]  [address] [employer]    Dear Manju Bergman MD,      Thank you for referring Ms. Gurjit Engel to [facility] for evaluation. My notes for this consultation are attached. If you have questions, please do not hesitate to call me. I look forward to following your patient along with you.       Sincerely,    Jax Baltazar MD

## 2021-06-23 NOTE — PROGRESS NOTES
Neurology Consult      Subjective:      Jeremias Xie is a 28 y.o. female who comes in today with the following migraine headache follow-up. Says for the most part her headaches are better. Does notice when the office grind gets problematic headaches are accordingly harder to contain. We will increase the Inderal from 60 to 80 mg and will need to give it at least 2 weeks to see how it works. Reeducated on using the sumatriptan hand at moment 0 with over-the-counter remedies which could work a better combination effect rather than alone. Strongly urged to keep the sumatriptan hand with her at all times. Get good sleep minimize stress and exercise could help. Continue to monitor headaches to increase self-awareness. On the perimenstrual headaches some women find that over-the-counter supplemental magnesium and vitamin B2 or riboflavin can help the cause. Has tried Trokendi but it said it messed with her memory. For future reference could consider Aimovig or 1 the CGRP antagonists as she has currently tried Topamax or the equivalent plus her current beta-blocker propranolol. Will suggest a revisit in 2 months. Current Outpatient Medications   Medication Sig Dispense Refill    metFORMIN ER (GLUCOPHAGE XR) 500 mg tablet       SUMAtriptan (IMITREX) 50 mg tablet TAKE 1 TABLET BY MOUTH ONCE AS NEEDED FOR MIGRAINE FOR UP TO 1 DOSE      multivitamin (ONE A DAY) tablet Take 1 Tablet by mouth daily.  propranolol LA (INDERAL LA) 80 mg SR capsule Take 1 Capsule by mouth daily. 30 Capsule 5    butalbital-acetaminophen-caff (FIORICET) -40 mg per capsule Take 1-2 Caps by mouth every six (6) hours as needed for Headache. 20 Cap 0    fexofenadine-pseudoephedrine (Allegra-D 24 Hour) 180-240 mg per tablet Take 1 Tab by mouth daily.  albuterol (PROAIR HFA) 90 mcg/actuation inhaler Take 1 Puff by inhalation every four (4) hours as needed for Wheezing.  (Patient taking differently: Take 2 Puffs by inhalation every four (4) hours as needed for Wheezing.) 1 Inhaler 3    methIMAzole (TAPAZOLE) 5 mg tablet GIVE 1/2 TABLET BY MOUTH TWICE DAILY (Patient not taking: Reported on 6/23/2021)      methocarbamoL (ROBAXIN) 500 mg tablet TAKE 2 TABLETS BY MOUTH 4 TIMES A DAY AS NEEDED FOR MUSCLE SPASM (Patient not taking: Reported on 6/23/2021)      Novofine 32 32 gauge x 1/4\" ndle USE AS DIRECTED DAILY BY SUBCUTANEOUS ROUTE (Patient not taking: Reported on 6/23/2021)      liraglutide (VICTOZA) 0.6 mg/0.1 mL (18 mg/3 mL) pnij 0.6 mg by SubCUTAneous route. (Patient not taking: Reported on 6/23/2021)      topiramate ER (Trokendi XR) 25 mg capsule Take 1 Cap by mouth daily. Indications: migraine prevention 30 Cap 4    buPROPion XL (WELLBUTRIN XL) 150 mg tablet Take 1 Tab by mouth every morning. (Patient not taking: Reported on 6/23/2021) 30 Tab 0    cyanocobalamin (VITAMIN B-12) 1,000 mcg tablet Take 1,000 mcg by mouth daily. (Patient not taking: Reported on 6/23/2021)      cholecalciferol (VITAMIN D3) 1,000 unit cap Take  by mouth daily. (Patient not taking: Reported on 6/23/2021)      LORazepam (ATIVAN) 0.5 mg tablet Take  by mouth. (Patient not taking: Reported on 6/23/2021)      fluticasone (FLONASE) 50 mcg/actuation nasal spray 2 Sprays by Both Nostrils route daily.  fexofenadine (ALLEGRA) 180 mg tablet Take 180 mg by mouth daily.         Allergies   Allergen Reactions    Amoxicillin Hives and Other (comments)    Shellfish Containing Products Unknown (comments) and Shortness of Breath    Potato Shortness of Breath    Tomato Itching and Shortness of Breath    Grass Pollen-[place], Standard Swelling    Iodine Hives    Malt Extract Unknown (comments)    Oak Unknown (comments)    Pcn [Penicillins] Unknown (comments)    Ragweed Unknown (comments)    Yeast, Dried Unknown (comments)     Past Medical History:   Diagnosis Date    Asthma     Depression     Family history of skin cancer     maternal grandfather - melanoma    Headache(784.0)     IBS (irritable bowel syndrome) 2019    PCOS (polycystic ovarian syndrome)     PTSD (post-traumatic stress disorder)     Sun-damaged skin     Tanning bed exposure       Past Surgical History:   Procedure Laterality Date    COLONOSCOPY N/A 2/4/2019    COLONOSCOPY, [facility] at [facility] ENDOSCOPY    HX TONSIL AND ADENOIDECTOMY      HX TONSILLECTOMY      HX WISDOM TEETH EXTRACTION        Social History     Socioeconomic History    Marital status: SINGLE     Spouse name: Not on file    Number of children: Not on file    Years of education: Not on file    Highest education level: Not on file   Occupational History    Not on file   Tobacco Use    Smoking status: Former Smoker    Smokeless tobacco: Never Used   Substance and Sexual Activity    Alcohol use: Yes     Alcohol/week: 1.7 standard drinks     Types: 2 Glasses of wine per week    Drug use: No    Sexual activity: Yes     Partners: Male   Other Topics Concern    Not on file   Social History Narrative    ** Merged History Encounter **          Social Determinants of Health     Financial Resource Strain:     Difficulty of Paying Living Expenses:    Food Insecurity:     Worried About Running Out of Food in the Last Year:     Ran Out of Food in the Last Year:    Transportation Needs:     Lack of Transportation (Medical):      Lack of Transportation (Non-Medical):    Physical Activity:     Days of Exercise per Week:     Minutes of Exercise per Session:    Stress:     Feeling of Stress :    Social Connections:     Frequency of Communication with Friends and Family:     Frequency of Social Gatherings with Friends and Family:     Attends Restorationism Services:     Active Member of Clubs or Organizations:     Attends Club or Organization Meetings:     Marital Status:    Intimate Partner Violence:     Fear of Current or Ex-Partner:     Emotionally Abused:     Physically Abused:     Sexually Abused:       Family History   Problem Relation Age of Onset    Psychiatric Disorder Father     Cancer Maternal Aunt     Heart Disease Maternal Grandfather     Hypertension Maternal Grandfather     Cancer Paternal Grandmother     Diabetes Paternal Grandmother     Hypertension Paternal Grandmother       Visit Vitals  /80   Pulse 79   Resp 18   Ht 5' 5.5\" (1.664 m)   Wt 124.9 kg (275 lb 4.8 oz)   LMP 06/22/2021 (Exact Date)   SpO2 94%   BMI 45.12 kg/m²        Review of Systems:   A comprehensive review of systems was negative except for that written in the HPI. Neuro Exam:     Appearance: The patient is well developed, well nourished, provides a coherent history and is in no acute distress. Mental Status: Oriented to time, place and person. Mood and affect appropriate. Cranial Nerves:   Intact visual fields. Fundi are benign. AARTI, EOM's full, no nystagmus, no ptosis. Facial sensation is normal. Corneal reflexes are intact. Facial movement is symmetric. Hearing is normal bilaterally. Palate is midline with normal sternocleidomastoid and trapezius muscles are normal. Tongue is midline. Motor:  5/5 strength in upper and lower proximal and distal muscles. Normal bulk and tone. No fasciculations. Reflexes:   Deep tendon reflexes 2+/4 and symmetrical.   Sensory:   Normal to touch, pinprick and vibration. Gait:  Normal gait. Tremor:   No tremor noted. Cerebellar:  No cerebellar signs present. Neurovascular:  Normal heart sounds and regular rhythm, peripheral pulses intact, and no carotid bruits. Assessment:   Migraine headaches. See above dictation. Hopefully this increase in Inderal LA to 80 mg daily will help and reoriented on how she can use the sumatriptan with an over-the-counter remedy at moment 0 to help her cause. Good good sleep minimize stress and exercise could help. Continue to monitor headaches to increase self-awareness.   Told that supplemental magnesium and riboflavin around the time of her cycle could help those headaches. Plan:   Revisit 2 months.   Signed by :  Liban Bunn MD

## 2021-06-23 NOTE — PATIENT INSTRUCTIONS
Patient history viewed patient examined. Based on immediate headache feedback will increase the Inderal LA from 60 to 80 mg. Was reeducated on the fact that she can use over-the-counter remedies at moment zero, with the sumatriptan, for potentially a better combination headache effect. Get good sleep minimize stress and a regular exercise program could help the cause. Continue to monitor headaches to increase self-awareness. For those headaches around her menstrual cycle sometimes supplemental magnesium and riboflavin or vitamin B2 can help. Revisit 2 months.

## 2021-06-23 NOTE — PROGRESS NOTES
Identified pt with two pt identifiers(name and ). Reviewed record in preparation for visit and have obtained necessary documentation. Chief Complaint   Patient presents with    Follow-up        Health Maintenance Due   Topic    Hepatitis C Screening     Pneumococcal 0-64 years (1 of 2 - PPSV23)    COVID-19 Vaccine (1)    PAP AKA CERVICAL CYTOLOGY      Pt had issues with her eyes last week-- it is better now. She thinks the medications isn't really working for it. Visit Vitals  /80   Pulse 79   Resp 18   Ht 5' 5.5\" (1.664 m)   Wt 275 lb 4.8 oz (124.9 kg)   SpO2 94%   BMI 45.12 kg/m²         Coordination of Care Questionnaire:  :   1) Have you been to an emergency room, urgent care, or hospitalized since your last visit? If yes, where when, and reason for visit? NO      2. Have seen or consulted any other health care provider since your last visit? If yes, where when, and reason for visit? NO      Patient is accompanied by SELF  I have received verbal consent from Kaitlynn Jose to discuss any/all medical information while they are present in the room.

## 2021-07-28 ENCOUNTER — OFFICE VISIT (OUTPATIENT)
Dept: PRIMARY CARE CLINIC | Age: 36
End: 2021-07-28
Payer: COMMERCIAL

## 2021-07-28 VITALS
BODY MASS INDEX: 43.97 KG/M2 | DIASTOLIC BLOOD PRESSURE: 86 MMHG | OXYGEN SATURATION: 98 % | HEART RATE: 87 BPM | TEMPERATURE: 97.8 F | RESPIRATION RATE: 15 BRPM | HEIGHT: 66 IN | WEIGHT: 273.6 LBS | SYSTOLIC BLOOD PRESSURE: 117 MMHG

## 2021-07-28 DIAGNOSIS — E66.01 OBESITY, MORBID (HCC): ICD-10-CM

## 2021-07-28 DIAGNOSIS — G57.12 MERALGIA PARAESTHETICA, LEFT: ICD-10-CM

## 2021-07-28 DIAGNOSIS — S93.492D SPRAIN OF OTHER LIGAMENT OF LEFT ANKLE, SUBSEQUENT ENCOUNTER: Primary | ICD-10-CM

## 2021-07-28 DIAGNOSIS — G43.019 INTRACTABLE MIGRAINE WITHOUT AURA AND WITHOUT STATUS MIGRAINOSUS: ICD-10-CM

## 2021-07-28 PROCEDURE — 99214 OFFICE O/P EST MOD 30 MIN: CPT | Performed by: INTERNAL MEDICINE

## 2021-07-28 RX ORDER — SUMATRIPTAN 50 MG/1
50 TABLET, FILM COATED ORAL
Qty: 9 TABLET | Refills: 1 | Status: SHIPPED | OUTPATIENT
Start: 2021-07-28 | End: 2021-07-28

## 2021-07-28 RX ORDER — SUMATRIPTAN 50 MG/1
TABLET, FILM COATED ORAL
Qty: 30 TABLET | Refills: 0 | Status: CANCELLED | OUTPATIENT
Start: 2021-07-28

## 2021-07-28 NOTE — PROGRESS NOTES
Alicia Moraes (: 1985) is a 28 y.o. female, established patient, here for evaluation of the following chief complaint(s):  Follow-up (AFSANEH ACOSTA went to ED- patient was told the she sprained ankle three weeks ago. Patient also c/o of intermitten pain on outside of leg)     Written by Leah Mccall, as dictated by Dr. Lucinda Ramirez MD.      ASSESSMENT/PLAN:  Below is the assessment and plan developed based on review of pertinent history, physical exam, labs, studies, and medications. 1. Sprain of other ligament of left ankle, subsequent encounter  Pain has improved but still presents occasionally. Referred to Podiatry.   -     REFERRAL TO PODIATRY    2. Intractable migraine without aura and without status migrainosus  Followed by neurology. Continue taking Imitrex 50 mg and Inderal 80 mg as prescribed. -     SUMAtriptan (IMITREX) 50 mg tablet; Take 1 Tablet by mouth once as needed for Migraine for up to 1 dose., Normal, Disp-9 Tablet, R-1 sent to pharmacy. 3. Obesity, morbid ([address]  Followed by weight loss. Continue taking Metformin 500 mg and phentermine as prescribed. I commended the pt on her healthy lifestyle choices and routines. Explained that 5,000 steps are needed daily for healthy lifestyle and to maintain conditioning, and she will need to increase to 10,000 a day to work on weight loss. 4. Meralgia paraesthetica, left  Explained that weight loss will improve sxs. Take OTC Tylenol prn. If sxs get more frequent and severe let me know. SUBJECTIVE/OBJECTIVE:  HPI     Patient presents today for a follow up on her L ankle pain. She presented to the ED in the past and was diagnosed with a sprain. X-ray was normal.  She was given crutches to use for 4 weeks. Her pain has improved but she notes occasional pain and swelling in the PM.     She c/o \"burning\" on her L thigh for 2 mins and then subsides. She takes Imitrex and Inderal for a hx of migraines.  She is followed by  Kenia Hylton, neurology. She takes phentermine and metformin for weight loss. She is followed by Dr. Celeste Baig weight loss clinic. She recently broke up with her long-term boyfriend and notes some sadness but not significant enough to feel depressed. Patient Active Problem List   Diagnosis Code    Anxiety disorder F41.9    Allergic asthma J45.909    Obesity, morbid ([facility] [address] E66.01        Current Outpatient Medications on File Prior to Visit   Medication Sig Dispense Refill    metFORMIN ER (GLUCOPHAGE XR) 500 mg tablet       multivitamin (ONE A DAY) tablet Take 1 Tablet by mouth daily.  propranolol LA (INDERAL LA) 80 mg SR capsule Take 1 Capsule by mouth daily. 30 Capsule 5    butalbital-acetaminophen-caff (FIORICET) -40 mg per capsule Take 1-2 Caps by mouth every six (6) hours as needed for Headache. 20 Cap 0    fexofenadine-pseudoephedrine (Allegra-D 24 Hour) 180-240 mg per tablet Take 1 Tab by mouth daily.  cyanocobalamin (VITAMIN B-12) 1,000 mcg tablet Take 1,000 mcg by mouth daily.  cholecalciferol (VITAMIN D3) 1,000 unit cap Take  by mouth daily.  fexofenadine (ALLEGRA) 180 mg tablet Take 180 mg by mouth daily.  albuterol (PROAIR HFA) 90 mcg/actuation inhaler Take 1 Puff by inhalation every four (4) hours as needed for Wheezing.  (Patient taking differently: Take 2 Puffs by inhalation every four (4) hours as needed for Wheezing.) 1 Inhaler 3    methIMAzole (TAPAZOLE) 5 mg tablet GIVE 1/2 TABLET BY MOUTH TWICE DAILY (Patient not taking: Reported on 6/23/2021)      methocarbamoL (ROBAXIN) 500 mg tablet TAKE 2 TABLETS BY MOUTH 4 TIMES A DAY AS NEEDED FOR MUSCLE SPASM (Patient not taking: Reported on 6/23/2021)      Novofine 32 32 gauge x 1/4\" ndle USE AS DIRECTED DAILY BY SUBCUTANEOUS ROUTE (Patient not taking: Reported on 6/23/2021)      [DISCONTINUED] SUMAtriptan (IMITREX) 50 mg tablet TAKE 1 TABLET BY MOUTH ONCE AS NEEDED FOR MIGRAINE FOR UP TO 1 DOSE      liraglutide (VICTOZA) 0.6 mg/0.1 mL (18 mg/3 mL) pnij 0.6 mg by SubCUTAneous route. (Patient not taking: Reported on 6/23/2021)      topiramate ER (Trokendi XR) 25 mg capsule Take 1 Cap by mouth daily. Indications: migraine prevention 30 Cap 4    buPROPion XL (WELLBUTRIN XL) 150 mg tablet Take 1 Tab by mouth every morning. (Patient not taking: Reported on 6/23/2021) 30 Tab 0    LORazepam (ATIVAN) 0.5 mg tablet Take  by mouth. (Patient not taking: Reported on 7/28/2021)      fluticasone (FLONASE) 50 mcg/actuation nasal spray 2 Sprays by Both Nostrils route daily. No current facility-administered medications on file prior to visit.        Allergies   Allergen Reactions    Amoxicillin Hives and Other (comments)    Shellfish Containing Products Unknown (comments) and Shortness of Breath    Potato Shortness of Breath    Tomato Itching and Shortness of Breath    Grass Pollen-[place], Standard Swelling    Iodine Hives    Malt Extract Unknown (comments)    Oak Unknown (comments)    Pcn [Penicillins] Unknown (comments)    Ragweed Unknown (comments)    Yeast, Dried Unknown (comments)       Past Medical History:   Diagnosis Date    Asthma     Depression     Family history of skin cancer     maternal grandfather - melanoma    Headache(784.0)     IBS (irritable bowel syndrome) 2019    PCOS (polycystic ovarian syndrome)     PTSD (post-traumatic stress disorder)     Sun-damaged skin     Tanning bed exposure        Past Surgical History:   Procedure Laterality Date    COLONOSCOPY N/A 2/4/2019    COLONOSCOPY, EGD performed by Lynnetta Gaucher[facility] at [facility] ENDOSCOPY    HX TONSIL AND ADENOIDECTOMY      HX TONSILLECTOMY      HX WISDOM TEETH EXTRACTION         Family History   Problem Relation Age of Onset    Psychiatric Disorder Father     Cancer Maternal Aunt     Heart Disease Maternal Grandfather     Hypertension Maternal Grandfather     Cancer Paternal Grandmother     Diabetes Paternal Grandmother     Hypertension Paternal Grandmother        Social History     Socioeconomic History    Marital status: SINGLE     Spouse name: Not on file    Number of children: Not on file    Years of education: Not on file    Highest education level: Not on file   Occupational History    Not on file   Tobacco Use    Smoking status: Former Smoker    Smokeless tobacco: Never Used   Vaping Use    Vaping Use: Never used   Substance and Sexual Activity    Alcohol use: Yes     Alcohol/week: 4.0 standard drinks     Types: 2 Glasses of wine, 2 Cans of beer per week     Comment: socially    Drug use: No    Sexual activity: Yes     Partners: Male   Other Topics Concern    Not on file   Social History Narrative    ** Merged History Encounter **          Social Determinants of Health       No visits with results within 3 Month(s) from this visit. Latest known visit with results is:   Admission on 04/05/2021, Discharged on 04/06/2021   Component Date Value Ref Range Status    SAMPLES BEING HELD 04/05/2021 bl,lav,rd,sst,pst   Final    COMMENT 04/05/2021 Add-on orders for these samples will be processed based on acceptable specimen integrity and analyte stability, which may vary by analyte. Final       Review of Systems   Constitutional: Negative for activity change, fatigue and unexpected weight change. HENT: Negative for congestion, hearing loss, rhinorrhea and sore throat. Eyes: Negative for discharge. Respiratory: Negative for cough, chest tightness and shortness of breath. Cardiovascular: Negative for leg swelling. Gastrointestinal: Negative for abdominal pain, constipation and diarrhea. Genitourinary: Negative for dysuria, flank pain, frequency and urgency. Musculoskeletal: Positive for arthralgias (L ankle) and myalgias (L thigh). Negative for back pain. Skin: Negative for color change and rash. Neurological: Positive for headaches. Negative for dizziness and light-headedness. Psychiatric/Behavioral: Negative for dysphoric mood and sleep disturbance. The patient is not nervous/anxious. Visit Vitals  /86 (BP 1 Location: Right arm)   Pulse 87   Temp 97.8 °F (36.6 °C)   Resp 15   Ht 5' 5.5\" (1.664 m)   Wt 273 lb 9.6 oz (124.1 kg)   SpO2 98%   BMI 44.84 kg/m²       Physical Exam  Vitals and nursing note reviewed. Constitutional:       General: She is not in acute distress. Appearance: Normal appearance. She is well-developed. She is not diaphoretic. HENT:      Right Ear: External ear normal.      Left Ear: External ear normal.   Eyes:      General: No scleral icterus. Right eye: No discharge. Left eye: No discharge. Extraocular Movements: Extraocular movements intact. Conjunctiva/sclera: Conjunctivae normal.   Cardiovascular:      Rate and Rhythm: Normal rate and regular rhythm. Pulmonary:      Effort: Pulmonary effort is normal.      Breath sounds: Normal breath sounds. No wheezing. Abdominal:      General: Bowel sounds are normal.      Palpations: Abdomen is soft. Tenderness: There is no abdominal tenderness. Musculoskeletal:      Cervical back: Normal range of motion and neck supple. Lymphadenopathy:      Cervical: No cervical adenopathy. Neurological:      Mental Status: She is alert and oriented to person, place, and time. Psychiatric:         Mood and Affect: Mood and affect normal.             An electronic signature was used to authenticate this note.   -- Scotty Barnes

## 2021-07-28 NOTE — PROGRESS NOTES
Chief Complaint   Patient presents with    Follow-up     LISSY went to ED- patient was told the she sprained ankle three weeks ago. Patient also c/o of intermitten pain on outside of leg       Visit Vitals  /86 (BP 1 Location: Right arm)   Pulse 87   Temp 97.8 °F (36.6 °C)   Resp 15   Ht 5' 5.5\" (1.664 m)   Wt 273 lb 9.6 oz (124.1 kg)   SpO2 98%   BMI 44.84 kg/m²       1. Have you been to the ER, urgent care clinic since your last visit? Hospitalized since your last visit? Yes [facility] ED    2. Have you seen or consulted any other health care providers outside of the [address] [place] [address] since your last visit? Include any pap smears or colon screening.  Julia Hardy

## 2021-08-12 ENCOUNTER — OFFICE VISIT (OUTPATIENT)
Dept: NEUROLOGY | Age: 36
End: 2021-08-12
Payer: COMMERCIAL

## 2021-08-12 VITALS
HEIGHT: 66 IN | BODY MASS INDEX: 43.39 KG/M2 | WEIGHT: 270 LBS | OXYGEN SATURATION: 99 % | HEART RATE: 80 BPM | SYSTOLIC BLOOD PRESSURE: 110 MMHG | DIASTOLIC BLOOD PRESSURE: 82 MMHG

## 2021-08-12 DIAGNOSIS — R51.9 MIXED HEADACHE: Primary | ICD-10-CM

## 2021-08-12 PROCEDURE — 99213 OFFICE O/P EST LOW 20 MIN: CPT | Performed by: SPECIALIST

## 2021-08-12 RX ORDER — PHENTERMINE HYDROCHLORIDE 37.5 MG/1
TABLET ORAL
COMMUNITY
Start: 2021-07-20

## 2021-08-12 RX ORDER — PROPRANOLOL HYDROCHLORIDE 80 MG/1
80 CAPSULE, EXTENDED RELEASE ORAL DAILY
Qty: 90 CAPSULE | Refills: 1 | Status: SHIPPED | OUTPATIENT
Start: 2021-08-12

## 2021-08-12 RX ORDER — SUMATRIPTAN 50 MG/1
TABLET, FILM COATED ORAL
COMMUNITY
Start: 2021-07-28 | End: 2021-12-20 | Stop reason: SDUPTHER

## 2021-08-12 NOTE — PROGRESS NOTES
Neurology Consult      Subjective:      Yoel Machado is a 28 y.o. female who comes in today on follow-up of migraine headaches. Says she has had some recent less than compliance with her medicines as she lost her aunt and went through a 9-year break-up with significant other and had recent law school testing etc.  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 Is on phentermine and is wondering whether beyond the stress and such just referenced whether that dose needs to be amended and/or taken earlier. I am not in charge of that medicine and would suggest those thoughts be run by the prescribing physician. Otherwise will renew her Inderal LA 80 mg and had a recent refill on the Imitrex which continues to work. I will see her back in December as it fits her schedule of classes and if the weather is bad can simply reschedule. Good luck on all fronts. Current Outpatient Medications   Medication Sig Dispense Refill    phentermine (ADIPEX-P) 37.5 mg tablet TAKE 1/2 TABLET BY MOUTH TWICE A DAY      SUMAtriptan (IMITREX) 50 mg tablet TAKE 1 TABLET BY MOUTH ONCE AS NEEDED FOR MIGRAINE FOR UP TO 1 DOSE.  propranolol LA (INDERAL LA) 80 mg SR capsule Take 1 Capsule by mouth daily. 90 Capsule 1    metFORMIN ER (GLUCOPHAGE XR) 500 mg tablet       multivitamin (ONE A DAY) tablet Take 1 Tablet by mouth daily.  fexofenadine-pseudoephedrine (Allegra-D 24 Hour) 180-240 mg per tablet Take 1 Tab by mouth daily.  cyanocobalamin (VITAMIN B-12) 1,000 mcg tablet Take 1,000 mcg by mouth daily.  cholecalciferol (VITAMIN D3) 1,000 unit cap Take  by mouth daily.  fexofenadine (ALLEGRA) 180 mg tablet Take 180 mg by mouth daily.  albuterol (PROAIR HFA) 90 mcg/actuation inhaler Take 1 Puff by inhalation every four (4) hours as needed for Wheezing.  (Patient taking differently: Take 2 Puffs by inhalation every four (4) hours as needed for Wheezing.) 1 Inhaler 3    methIMAzole (TAPAZOLE) 5 mg tablet GIVE 1/2 TABLET BY MOUTH TWICE DAILY (Patient not taking: Reported on 6/23/2021)      methocarbamoL (ROBAXIN) 500 mg tablet TAKE 2 TABLETS BY MOUTH 4 TIMES A DAY AS NEEDED FOR MUSCLE SPASM (Patient not taking: Reported on 6/23/2021)      Novofine 32 32 gauge x 1/4\" ndle USE AS DIRECTED DAILY BY SUBCUTANEOUS ROUTE (Patient not taking: Reported on 6/23/2021)      liraglutide (VICTOZA) 0.6 mg/0.1 mL (18 mg/3 mL) pnij 0.6 mg by SubCUTAneous route. (Patient not taking: Reported on 6/23/2021)      butalbital-acetaminophen-caff (FIORICET) -40 mg per capsule Take 1-2 Caps by mouth every six (6) hours as needed for Headache. (Patient not taking: Reported on 8/12/2021) 20 Cap 0    topiramate ER (Trokendi XR) 25 mg capsule Take 1 Cap by mouth daily. Indications: migraine prevention 30 Cap 4    LORazepam (ATIVAN) 0.5 mg tablet Take  by mouth. (Patient not taking: Reported on 7/28/2021)      fluticasone (FLONASE) 50 mcg/actuation nasal spray 2 Sprays by Both Nostrils route daily.         Allergies   Allergen Reactions    Amoxicillin Hives and Other (comments)    Shellfish Containing Products Unknown (comments) and Shortness of Breath    Potato Shortness of Breath    Tomato Itching and Shortness of Breath    Grass Pollen-[place], Standard Swelling    Iodine Hives    Malt Extract Unknown (comments)    Oak Unknown (comments)    Pcn [Penicillins] Unknown (comments)    Ragweed Unknown (comments)    Yeast, Dried Unknown (comments)     Past Medical History:   Diagnosis Date    Asthma     Depression     Family history of skin cancer     maternal grandfather - melanoma    Headache(784.0)     IBS (irritable bowel syndrome) 2019    PCOS (polycystic ovarian syndrome)     PTSD (post-traumatic stress disorder)     Sun-damaged skin     Tanning bed exposure       Past Surgical History:   Procedure Laterality Date    COLONOSCOPY N/A 2/4/2019    COLONOSCOPY, [facility]GD performed by Millie Bernard [facility] at [facility] ENDOSCOPY    HX TONSIL AND ADENOIDECTOMY      HX TONSILLECTOMY      HX WISDOM TEETH EXTRACTION        Social History     Socioeconomic History    Marital status: SINGLE     Spouse name: Not on file    Number of children: Not on file    Years of education: Not on file    Highest education level: Not on file   Occupational History    Not on file   Tobacco Use    Smoking status: Former Smoker    Smokeless tobacco: Never Used   Vaping Use    Vaping Use: Never used   Substance and Sexual Activity    Alcohol use: Yes     Alcohol/week: 4.0 standard drinks     Types: 2 Glasses of wine, 2 Cans of beer per week     Comment: socially    Drug use: No    Sexual activity: Yes     Partners: Male   Other Topics Concern    Not on file   Social History Narrative    ** Merged History Encounter **          Social Determinants of Health     Financial Resource Strain:     Difficulty of Paying Living Expenses:    Food Insecurity:     Worried About Running Out of Food in the Last Year:     Ran Out of Food in the Last Year:    Transportation Needs:     Lack of Transportation (Medical):      Lack of Transportation (Non-Medical):    Physical Activity:     Days of Exercise per Week:     Minutes of Exercise per Session:    Stress:     Feeling of Stress :    Social Connections:     Frequency of Communication with Friends and Family:     Frequency of Social Gatherings with Friends and Family:     Attends [employer] Services:     Active Member of Clubs or Organizations:     Attends Club or Organization Meetings:     Marital Status:    Intimate Partner Violence:     Fear of Current or Ex-Partner:     Emotionally Abused:     Physically Abused:     Sexually Abused:       Family History   Problem Relation Age of Onset    Psychiatric Disorder Father     Cancer Maternal Aunt     Heart Disease Maternal Grandfather     Hypertension Maternal Grandfather     Cancer Paternal Grandmother     Diabetes Paternal Grandmother  Hypertension Paternal Grandmother       Visit Vitals  /82 (BP 1 Location: Left upper arm, BP Patient Position: Sitting, BP Cuff Size: Large adult)   Pulse 80   Ht 5' 5.5\" (1.664 m)   Wt 122.5 kg (270 lb)   SpO2 99%   BMI 44.25 kg/m²        Review of Systems:   A comprehensive review of systems was negative except for that written in the HPI. Neuro Exam:     Appearance: The patient is well developed, well nourished, provides a coherent history and is in no acute distress. Mental Status: Oriented to time, place and person. Mood and affect appropriate. Cranial Nerves:   Intact visual fields. Fundi are benign. AARTI, EOM's full, no nystagmus, no ptosis. Facial sensation is normal. Corneal reflexes are intact. Facial movement is symmetric. Hearing is normal bilaterally. Palate is midline with normal sternocleidomastoid and trapezius muscles are normal. Tongue is midline. Motor:  5/5 strength in upper and lower proximal and distal muscles. Normal bulk and tone. No fasciculations. Reflexes:   Deep tendon reflexes 2+/4 and symmetrical.   Sensory:   Normal to touch, pinprick and vibration. Gait:  Normal gait. Tremor:   No tremor noted. Cerebellar:  No cerebellar signs present. Neurovascular:  Normal heart sounds and regular rhythm, peripheral pulses intact, and no carotid bruits. Assessment:   Mixed headache. 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 Hopefully will get back in a groove with her headache control and will renew the Inderal LA 80 mg and just got a refill on her sumatriptan for rescue.   Is on phentermine and thinks that that may be a reason why at night she is less prepared to go to sleep on time so I suggested may be an earlier time of taking the drug and perhaps an amended dose if that does not work.  Get good sleep minimize stress and regular physical activity could help. My condolences on the passing of her aunt. Plan:   Revisit in December.   Signed by :  Dallin Aponte MD

## 2021-08-12 NOTE — LETTER
8/12/2021    Patient: Gamal Vu   YOB: 1985   Date of Visit: 8/12/2021     Dinorah Varela MD  [address] [place] [address]  [place]    Dear Dinorah Varela MD,      Thank you for referring Ms. Cassidy Hardin to [facility] for evaluation. My notes for this consultation are attached. If you have questions, please do not hesitate to call me. I look forward to following your patient along with you.       Sincerely,    Gay Medina MD

## 2021-08-12 NOTE — PATIENT INSTRUCTIONS
Patient history viewed patient examined. Recent tension and stress provoking factors and some loss of compliance with medicine. Hopefully back on track and my condolences on her aunt passing. Suggest a revisit in 4 months and will renew the propranolol as a headache preventative agent. Suggest referencing the phentermine as to when and how much she takes so she gets to bed on time and less rebounding headache due to less than adequate sleep and quality.

## 2021-09-06 ENCOUNTER — PATIENT MESSAGE (OUTPATIENT)
Dept: NEUROLOGY | Age: 36
End: 2021-09-06

## 2021-10-13 ENCOUNTER — VIRTUAL VISIT (OUTPATIENT)
Dept: PRIMARY CARE CLINIC | Age: 36
End: 2021-10-13
Payer: COMMERCIAL

## 2021-10-13 DIAGNOSIS — R50.9 LOW GRADE FEVER: ICD-10-CM

## 2021-10-13 DIAGNOSIS — J32.0 CHRONIC MAXILLARY SINUSITIS: Primary | ICD-10-CM

## 2021-10-13 DIAGNOSIS — F32.9 REACTIVE DEPRESSION: ICD-10-CM

## 2021-10-13 DIAGNOSIS — H92.02 LEFT EAR PAIN: ICD-10-CM

## 2021-10-13 DIAGNOSIS — R61 NIGHT SWEATS: ICD-10-CM

## 2021-10-13 PROCEDURE — 99214 OFFICE O/P EST MOD 30 MIN: CPT | Performed by: INTERNAL MEDICINE

## 2021-10-13 RX ORDER — BUPROPION HYDROCHLORIDE 100 MG/1
100 TABLET, EXTENDED RELEASE ORAL DAILY
Qty: 30 TABLET | Refills: 0 | Status: SHIPPED | OUTPATIENT
Start: 2021-10-13 | End: 2021-11-12

## 2021-10-13 RX ORDER — METHYLPREDNISOLONE 4 MG/1
TABLET ORAL
Qty: 1 DOSE PACK | Refills: 0 | Status: SHIPPED | OUTPATIENT
Start: 2021-10-13 | End: 2021-12-20 | Stop reason: ALTCHOICE

## 2021-10-13 RX ORDER — DOXYCYCLINE 100 MG/1
100 CAPSULE ORAL 2 TIMES DAILY
Qty: 20 CAPSULE | Refills: 0 | Status: SHIPPED | OUTPATIENT
Start: 2021-10-13 | End: 2021-10-23

## 2021-10-13 NOTE — PROGRESS NOTES
Mary Redding (: 1985) is a 28 y.o. female, established patient, here for evaluation of the following chief complaint(s):   Sinus Pain and Fever     Written by Elijah Love, as dictated by Dr. Carter Bullock MD.      ASSESSMENT/PLAN:  Below is the assessment and plan developed based on review of pertinent history, labs, studies, and medications. 1. Chronic maxillary sinusitis  Prescribed doxycycline 100 mg, take 1 tab BID for 10 days as prescribed. Prescribed Medrol Dosepacke 4 mg, take as prescribed. Potential side effects were discussed. -     doxycycline (VIBRAMYCIN) 100 mg capsule; Take 1 Capsule by mouth two (2) times a day for 10 days. , Normal, Disp-20 Capsule, R-0 sent to pharmacy. -     methylPREDNISolone (MEDROL DOSEPACK) 4 mg tablet; As directed, Normal, Disp-1 Dose Pack, R-0 sent to pharmacy. 2. Left ear pain  Prescribed Medrol Dosepacke 4 mg, take as prescribed. Potential side effects were discussed. -     methylPREDNISolone (MEDROL DOSEPACK) 4 mg tablet; As directed, Normal, Disp-1 Dose Pack, R-0 sent to pharmacy. 3. Low grade fever  Secondary to sinus infection. Take OTC Tylenol as directed on the bottle. 4. Night sweats  Secondary to sinus infection. Take OTC Tylenol as directed on the bottle. 5. Reactive depression  Prescribed Wellbutrin 100 mg, take 1 tab daily as prescribed. Potential side effects were discussed. Let me know how the medication is working. Referred to Psychology. Advised her to look at [email or web address] to find a therapist.   -     REFERRAL TO PSYCHOLOGY  -     buPROPion SR (WELLBUTRIN SR) 100 mg SR tablet; Take 1 Tablet by mouth daily for 30 days. , Normal, Disp-30 Tablet, R-0 sent to pharmacy. SUBJECTIVE/OBJECTIVE:  HPI    Patient presents today virtually c/o fevers, night sweats, L ear pain, jaw pain, sinus pressure, sinus pain, and congestion x 1 month. She completed a rapid and PCR COVID-19 test and both were negative.  She was seen by the doctor on campus and was prescribed azithromycin. She finished the antibiotic and notes minimal relief of symptoms. She has been struggling with reactive depression due to her breakup and aunts passing. She d/c Wellbutrin 100 mg. She is interested in talking to a therapist.     She recently restarted phentermine 37.5 mg and notes the medication is working well. She takes propranolol 80 mg for HTN. Her BP was checked when she presented to the doctor on campus and her reading was within normal range. Patient Active Problem List   Diagnosis Code    Anxiety disorder F41.9    Allergic asthma J45.909    Obesity, morbid ([facility] [address] E66.01        Current Outpatient Medications on File Prior to Visit   Medication Sig Dispense Refill    phentermine (ADIPEX-P) 37.5 mg tablet TAKE 1/2 TABLET BY MOUTH TWICE A DAY      SUMAtriptan (IMITREX) 50 mg tablet TAKE 1 TABLET BY MOUTH ONCE AS NEEDED FOR MIGRAINE FOR UP TO 1 DOSE.  propranolol LA (INDERAL LA) 80 mg SR capsule Take 1 Capsule by mouth daily. 90 Capsule 1    metFORMIN ER (GLUCOPHAGE XR) 500 mg tablet       methIMAzole (TAPAZOLE) 5 mg tablet GIVE 1/2 TABLET BY MOUTH TWICE DAILY (Patient not taking: Reported on 6/23/2021)      methocarbamoL (ROBAXIN) 500 mg tablet TAKE 2 TABLETS BY MOUTH 4 TIMES A DAY AS NEEDED FOR MUSCLE SPASM (Patient not taking: Reported on 6/23/2021)      multivitamin (ONE A DAY) tablet Take 1 Tablet by mouth daily.  fexofenadine-pseudoephedrine (Allegra-D 24 Hour) 180-240 mg per tablet Take 1 Tab by mouth daily.  cyanocobalamin (VITAMIN B-12) 1,000 mcg tablet Take 1,000 mcg by mouth daily.  cholecalciferol (VITAMIN D3) 1,000 unit cap Take  by mouth daily.  [DISCONTINUED] LORazepam (ATIVAN) 0.5 mg tablet Take  by mouth. (Patient not taking: Reported on 7/28/2021)      [DISCONTINUED] fexofenadine (ALLEGRA) 180 mg tablet Take 180 mg by mouth daily.       [DISCONTINUED] albuterol (PROAIR HFA) 90 mcg/actuation inhaler Take 1 Puff by inhalation every four (4) hours as needed for Wheezing. (Patient taking differently: Take 2 Puffs by inhalation every four (4) hours as needed for Wheezing.) 1 Inhaler 3     No current facility-administered medications on file prior to visit.        Allergies   Allergen Reactions    Amoxicillin Hives and Other (comments)    Shellfish Containing Products Unknown (comments) and Shortness of Breath    Potato Shortness of Breath    Tomato Itching and Shortness of Breath    Grass Pollen-[place], Standard Swelling    Iodine Hives    Malt Extract Unknown (comments)    Oak Unknown (comments)    Pcn [Penicillins] Unknown (comments)    Ragweed Unknown (comments)    Yeast, Dried Unknown (comments)       Past Medical History:   Diagnosis Date    Asthma     Depression     Family history of skin cancer     maternal grandfather - melanoma    Headache(784.0)     IBS (irritable bowel syndrome) 2019    PCOS (polycystic ovarian syndrome)     PTSD (post-traumatic stress disorder)     Sun-damaged skin     Tanning bed exposure        Past Surgical History:   Procedure Laterality Date    COLONOSCOPY N/A 2/4/2019    COLONOSCOPY, [facility]GD performed by Carlene Morris MD at [facility] ENDOSCOPY    HX TONSIL AND ADENOIDECTOMY      HX TONSILLECTOMY      HX WISDOM TEETH EXTRACTION         Family History   Problem Relation Age of Onset    Psychiatric Disorder Father     Cancer Maternal Aunt     Heart Disease Maternal Grandfather     Hypertension Maternal Grandfather     Cancer Paternal Grandmother     Diabetes Paternal Grandmother     Hypertension Paternal Grandmother        Social History     Socioeconomic History    Marital status: SINGLE     Spouse name: Not on file    Number of children: Not on file    Years of education: Not on file    Highest education level: Not on file   Occupational History    Not on file   Tobacco Use    Smoking status: Former Smoker    Smokeless tobacco: Never Used   Vaping Use    Vaping Use: Never used   Substance and Sexual Activity    Alcohol use: Yes     Alcohol/week: 4.0 standard drinks     Types: 2 Glasses of wine, 2 Cans of beer per week     Comment: socially    Drug use: No    Sexual activity: Yes     Partners: Male   Other Topics Concern    Not on file   Social History Narrative    ** Merged History Encounter **          Social Determinants of Health     Financial Resource Strain:     Difficulty of Paying Living Expenses:    Food Insecurity:     Worried About Running Out of Food in the Last Year:     Ran Out of Food in the Last Year:    Transportation Needs:     Lack of Transportation (Medical):  Lack of Transportation (Non-Medical):    Physical Activity:     Days of Exercise per Week:     Minutes of Exercise per Session:    Stress:     Feeling of Stress :    Social Connections:     Frequency of Communication with Friends and Family:     Frequency of Social Gatherings with Friends and Family:     Attends [employer]ism Services:     Active Member of Clubs or Organizations:     Attends Club or Organization Meetings:     Marital Status:    Intimate Partner Violence:     Fear of Current or Ex-Partner:     Emotionally Abused:     Physically Abused:     Sexually Abused:        No visits with results within 3 Month(s) from this visit. Latest known visit with results is:   Admission on 04/05/2021, Discharged on 04/06/2021   Component Date Value Ref Range Status    SAMPLES BEING HELD 04/05/2021 bl,lav,rd,sst,pst   Final    COMMENT 04/05/2021 Add-on orders for these samples will be processed based on acceptable specimen integrity and analyte stability, which may vary by analyte. Final       Review of Systems   Constitutional: Positive for fatigue and fever. Negative for activity change and unexpected weight change. HENT: Positive for congestion, ear pain, sinus pressure and sinus pain.  Negative for hearing loss, rhinorrhea and sore throat. Eyes: Negative for discharge. Respiratory: Negative for cough, chest tightness and shortness of breath. Cardiovascular: Negative for leg swelling. Gastrointestinal: Negative for abdominal pain, constipation and diarrhea. Genitourinary: Negative for dysuria, flank pain, frequency and urgency. Musculoskeletal: Negative for arthralgias, back pain and myalgias. Skin: Negative for color change and rash. Neurological: Negative for dizziness, light-headedness and headaches. Psychiatric/Behavioral: Positive for dysphoric mood. Negative for sleep disturbance. The patient is not nervous/anxious.            Physical Exam    [INSTRUCTIONS:  \"[x]\" Indicates a positive item  \"[]\" Indicates a negative item  -- DELETE ALL ITEMS NOT EXAMINED]    Constitutional: [x] Appears well-developed and well-nourished [x] No apparent distress      [] Abnormal -     Mental status: [x] Alert and awake  [x] Oriented to person/place/time [x] Able to follow commands    [] Abnormal -     Eyes:   EOM    [x]  Normal    [] Abnormal -   Sclera  [x]  Normal    [] Abnormal -          Discharge [x]  None visible   [] Abnormal -     HENT: [x] Normocephalic, atraumatic  [] Abnormal -   [x] Mouth/Throat: Mucous membranes are moist    External Ears [x] Normal  [] Abnormal -    Neck: [x] No visualized mass [] Abnormal -     Pulmonary/Chest: [x] Respiratory effort normal   [x] No visualized signs of difficulty breathing or respiratory distress        [] Abnormal -      Musculoskeletal:   [x] Normal gait with no signs of ataxia         [x] Normal range of motion of neck        [] Abnormal -     Neurological:        [x] No Facial Asymmetry (Cranial nerve 7 motor function) (limited exam due to video visit)          [x] No gaze palsy        [] Abnormal -          Skin:        [x] No significant exanthematous lesions or discoloration noted on facial skin         [] Abnormal -            Psychiatric:       [x] Normal Affect [] Abnormal - [x] No Hallucinations    Other pertinent observable physical exam findings:-        Yolie Anguiano, was evaluated through a synchronous (real-time) audio-video encounter. The patient (or guardian if applicable) is aware that this is a billable service. Verbal consent to proceed has been obtained within the past 12 months. The visit was conducted pursuant to the emergency declaration under the [address] [place] [address], [address] [place] [address] authority and the [employer] and [employer] Act. Patient identification was verified, and a caregiver was present when appropriate. The patient was located in a state where the provider was credentialed to provide care. An electronic signature was used to authenticate this note.   -- Elaine Bustos

## 2021-12-09 DIAGNOSIS — E04.1 THYROID NODULE: Primary | ICD-10-CM

## 2021-12-18 ENCOUNTER — PATIENT MESSAGE (OUTPATIENT)
Dept: PRIMARY CARE CLINIC | Age: 36
End: 2021-12-18

## 2021-12-18 ENCOUNTER — HOSPITAL ENCOUNTER (OUTPATIENT)
Dept: ULTRASOUND IMAGING | Age: 36
Discharge: HOME | End: 2021-12-18
Attending: INTERNAL MEDICINE
Payer: MEDICAID

## 2021-12-18 DIAGNOSIS — E04.1 THYROID NODULE: ICD-10-CM

## 2021-12-18 PROCEDURE — 76536 US EXAM OF HEAD AND NECK: CPT

## 2021-12-20 ENCOUNTER — OFFICE VISIT (OUTPATIENT)
Dept: NEUROLOGY | Age: 36
End: 2021-12-20
Payer: COMMERCIAL

## 2021-12-20 VITALS
DIASTOLIC BLOOD PRESSURE: 81 MMHG | OXYGEN SATURATION: 99 % | RESPIRATION RATE: 16 BRPM | WEIGHT: 278 LBS | BODY MASS INDEX: 44.68 KG/M2 | HEART RATE: 70 BPM | HEIGHT: 66 IN | SYSTOLIC BLOOD PRESSURE: 117 MMHG | TEMPERATURE: 97.6 F

## 2021-12-20 DIAGNOSIS — G43.009 MIGRAINE WITHOUT AURA AND WITHOUT STATUS MIGRAINOSUS, NOT INTRACTABLE: Primary | ICD-10-CM

## 2021-12-20 PROCEDURE — 99214 OFFICE O/P EST MOD 30 MIN: CPT | Performed by: SPECIALIST

## 2021-12-20 RX ORDER — SUMATRIPTAN 50 MG/1
TABLET, FILM COATED ORAL
Qty: 9 TABLET | Refills: 3 | Status: SHIPPED | OUTPATIENT
Start: 2021-12-20 | End: 2022-04-26 | Stop reason: SDUPTHER

## 2021-12-20 NOTE — TELEPHONE ENCOUNTER
From: Yolie Anguiano  Sent: 12/20/2021 8:51 AM EST  To: [facility]  Subject: Adrenal Check? Chio Paul,    Could we please do that then? The virtual appointment, I mean. I think its worth exploring with her.     Thanks,    Vadim

## 2021-12-20 NOTE — LETTER
12/20/2021    Patient: Nadir Bearden   YOB: 1985   Date of Visit: 12/20/2021     Edward Lim MD  [address] [place] [address]  Via In [facility] [address]    Dear Edward Lim MD,      Thank you for referring Ms. Shirlene Burton to [facility] for evaluation. My notes for this consultation are attached. If you have questions, please do not hesitate to call me. I look forward to following your patient along with you.       Sincerely,    Nalini Yepez MD

## 2021-12-20 NOTE — PROGRESS NOTES
Chief Complaint   Patient presents with    Migraine     reports migraines r/t stress level// range from 1 monthly migraine to daily migraines // Propanolol 80 mg daily and PRN Imitrex

## 2021-12-20 NOTE — PROGRESS NOTES
Neurology Consult      Subjective:      Hal Elaine is a 39 y.o. female who comes in today on a migraine headache follow-up. Is on Inderal LA 80 mg daily and sumatriptan and 100 mg as rescue. She says she feels like she is in consistent headache control except for last month apparently was 1 of stress etc.  Otherwise we talked about getting good sleep minimizing the stress and a regular exercise program such as even walking. Was also reminded as she knew with the sumatriptan and that she could combine it with an over-the-counter remedy for potentially more advantageous headache rescue combining the attributes of the prostaglandin inhibition with serotonin 1B and 1D receptors. Also reference that at times she will have some intermittent visual blurring that I understand is in both eyes. Her last formal eye check was in last March and otherwise okay. She had a previous assessment for me as it went to an LP with normal opening pressure and spinal fluid constituents etc.  Also had a preceding MRI imaging and it was unrevealing. Would suggest an update with her eye doctor and see if there is any new discovery process to the intermittent blurry vision she complains of because I made no critical discoveries today. Current Outpatient Medications   Medication Sig Dispense Refill    SUMAtriptan (IMITREX) 50 mg tablet 9TAKE 1 TABLET BY MOUTH ONCE AS NEEDED FOR MIGRAINE FOR UP TO 1 DOSE. 9 Tablet 3    phentermine (ADIPEX-P) 37.5 mg tablet TAKE 1/2 TABLET BY MOUTH TWICE A DAY      propranolol LA (INDERAL LA) 80 mg SR capsule Take 1 Capsule by mouth daily. 90 Capsule 1    metFORMIN ER (GLUCOPHAGE XR) 500 mg tablet Take 1,000 mg by mouth.  multivitamin (ONE A DAY) tablet Take 1 Tablet by mouth daily.  fexofenadine-pseudoephedrine (Allegra-D 24 Hour) 180-240 mg per tablet Take 1 Tab by mouth daily.       methIMAzole (TAPAZOLE) 5 mg tablet GIVE 1/2 TABLET BY MOUTH TWICE DAILY (Patient not taking: Reported on 6/23/2021)      methocarbamoL (ROBAXIN) 500 mg tablet TAKE 2 TABLETS BY MOUTH 4 TIMES A DAY AS NEEDED FOR MUSCLE SPASM (Patient not taking: Reported on 6/23/2021)      cyanocobalamin (VITAMIN B-12) 1,000 mcg tablet Take 1,000 mcg by mouth daily. (Patient not taking: Reported on 12/20/2021)      cholecalciferol (VITAMIN D3) 1,000 unit cap Take  by mouth daily. (Patient not taking: Reported on 12/20/2021)        Allergies   Allergen Reactions    Amoxicillin Hives and Other (comments)    Shellfish Containing Products Unknown (comments) and Shortness of Breath    Potato Shortness of Breath    Tomato Itching and Shortness of Breath    Grass Pollen-[place], Standard Swelling    Iodine Hives    Malt Extract Unknown (comments)    Oak Unknown (comments)    Pcn [Penicillins] Unknown (comments)    Ragweed Unknown (comments)    Yeast, Dried Unknown (comments)     Past Medical History:   Diagnosis Date    Asthma     Depression     Family history of skin cancer     maternal grandfather - melanoma    Headache(784.0)     IBS (irritable bowel syndrome) 2019    PCOS (polycystic ovarian syndrome)     PTSD (post-traumatic stress disorder)     Sun-damaged skin     Tanning bed exposure       Past Surgical History:   Procedure Laterality Date    COLONOSCOPY N/A 2/4/2019    COLONOS[facility]id [facility] at [facility] ENDOSCOPY    HX TONSIL AND ADENOIDECTOMY      HX TONSILLECTOMY      HX WISDOM TEETH EXTRACTION        Social History     Socioeconomic History    Marital status: SINGLE     Spouse name: Not on file    Number of children: Not on file    Years of education: Not on file    Highest education level: Not on file   Occupational History    Not on file   Tobacco Use    Smoking status: Former Smoker    Smokeless tobacco: Never Used   Vaping Use    Vaping Use: Never used   Substance and Sexual Activity    Alcohol use:  Yes     Alcohol/week: 4.0 standard drinks     Types: 2 Glasses of wine, 2 Cans of beer per week     Comment: socially    Drug use: No    Sexual activity: Yes     Partners: Male   Other Topics Concern    Not on file   Social History Narrative    ** Merged History Encounter **          Social Determinants of Health     Financial Resource Strain:     Difficulty of Paying Living Expenses: Not on file   Food Insecurity:     Worried About Running Out of Food in the Last Year: Not on file    Torrie of Food in the Last Year: Not on file   Transportation Needs:     Lack of Transportation (Medical): Not on file    Lack of Transportation (Non-Medical):  Not on file   Physical Activity:     Days of Exercise per Week: Not on file    Minutes of Exercise per Session: Not on file   Stress:     Feeling of Stress : Not on file   Social Connections:     Frequency of Communication with Friends and Family: Not on file    Frequency of Social Gatherings with Friends and Family: Not on file    Attends Faith Services: Not on file    Active Member of [address] [place] [address] or Organizations: Not on file    Attends Club or Organization Meetings: Not on file    Marital Status: Not on file   Intimate Partner Violence:     Fear of Current or Ex-Partner: Not on file    Emotionally Abused: Not on file    Physically Abused: Not on file    Sexually Abused: Not on file   Housing Stability:     Unable to Pay for Housing in the Last Year: Not on file    Number of [place] in the Last Year: Not on file    Unstable Housing in the Last Year: Not on file      Family History   Problem Relation Age of Onset    Psychiatric Disorder Father     Cancer Maternal Aunt     Heart Disease Maternal Grandfather     Hypertension Maternal Grandfather     Cancer Paternal Grandmother     Diabetes Paternal Grandmother     Hypertension Paternal Grandmother       Visit Vitals  /81 (BP 1 Location: Left upper arm, BP Patient Position: Sitting)   Pulse 70   Temp 97.6 °F (36.4 °C) (Temporal)   Resp 16   Ht 5' 5.5\" (1.664 m)   Wt 126.1 kg (278 lb)   LMP 11/22/2021 (Approximate)   SpO2 99%   BMI 45.56 kg/m²        Review of Systems:   A comprehensive review of systems was negative except for that written in the HPI. Neuro Exam:     Appearance: The patient is well developed, well nourished, provides a coherent history and is in no acute distress. Mental Status: Oriented to time, place and person. Mood and affect appropriate. Cranial Nerves:   Intact visual fields to static hand confrontation. Fundi are benign. AARTI, EOM's full, no nystagmus, no ptosis. Facial sensation is normal. Corneal reflexes are intact. Facial movement is symmetric. Hearing is normal bilaterally. Palate is midline with normal sternocleidomastoid and trapezius muscles are normal. Tongue is midline. Visual acuity near with correction 20/20 OU without hesitancy. APD negative. Motor:  5/5 strength in upper and lower proximal and distal muscles. Normal bulk and tone. No fasciculations. Reflexes:   Deep tendon reflexes 2+/4 and symmetrical.   Sensory:   Normal to touch, pinprick and vibration. Gait:  Normal gait. Tremor:   No tremor noted. Cerebellar:  No cerebellar signs present. Neurovascular:  Normal heart sounds and regular rhythm, peripheral pulses intact, and no carotid bruits. Assessment:   Migraine headaches. Exam looks good and made no new independent discovery of sorts. Would recommend in terms of intermittent visual distortion that she follow-up with her eye doctor. I thought her exam looked great today and reassured her accordingly. Renewed the Imitrex by request and suggest she stay on the Inderal LA 80 mg. Will pend revisit and see what her updated visual assessment amounts to. Plan:   Revisit pended.   Signed by :  Lucien Schwarz MD

## 2021-12-20 NOTE — PATIENT INSTRUCTIONS
Patient history viewed patient examined. Would suggest the patient follow-up with her eye doctor and see if there is any defined change of visual capacity including refraction that needs to be addressed. I find nothing on this particular visit and her previous test results are very reassuring to say the least.  Will renew the Imitrex and suggest that the inderal LA 80 mg be continued as previously prescribed. Have great and safe holidays. Will pend revisit for the results of the updated visual assessment referenced above.

## 2022-01-07 ENCOUNTER — VIRTUAL VISIT (OUTPATIENT)
Dept: PRIMARY CARE CLINIC | Age: 37
End: 2022-01-07
Payer: COMMERCIAL

## 2022-01-07 DIAGNOSIS — R50.9 LOW GRADE FEVER: ICD-10-CM

## 2022-01-07 DIAGNOSIS — J02.9 SORE THROAT: ICD-10-CM

## 2022-01-07 DIAGNOSIS — J45.909 ASTHMA DUE TO ENVIRONMENTAL ALLERGIES: Primary | ICD-10-CM

## 2022-01-07 DIAGNOSIS — E04.1 THYROID NODULE: ICD-10-CM

## 2022-01-07 DIAGNOSIS — R53.83 OTHER FATIGUE: ICD-10-CM

## 2022-01-07 PROCEDURE — 99214 OFFICE O/P EST MOD 30 MIN: CPT | Performed by: INTERNAL MEDICINE

## 2022-01-07 RX ORDER — ALBUTEROL SULFATE 90 UG/1
1 AEROSOL, METERED RESPIRATORY (INHALATION)
Qty: 18 G | Refills: 0 | Status: SHIPPED | OUTPATIENT
Start: 2022-01-07 | End: 2022-01-17

## 2022-01-07 NOTE — PROGRESS NOTES
Grayson Torres (: 1985) is a 39 y.o. female, established patient, here for evaluation of the following chief complaint(s):   Cold Symptoms     Written by Lidya Flood, as dictated by Dr. Dora Cai MD.      ASSESSMENT/PLAN:  Below is the assessment and plan developed based on review of pertinent history, labs, studies, and medications. 1. Asthma due to environmental allergies  She will continue on Allegra-D. Refilled her PRN albuterol inhaler. -     albuterol (PROVENTIL HFA, VENTOLIN HFA, PROAIR HFA) 90 mcg/actuation inhaler; Take 1 Puff by inhalation every six (6) hours as needed for Wheezing for up to 10 days. , Normal, Disp-18 g, R-0 sent to pharmacy. 2. Other fatigue  I referred her to Dr. Liliya Woods (ENT) since recent thyroid/parathyroid US has showed slight increase in nodule size and she has been feeling fatigued. 3. Low grade fever  Recommended staying well hydrated and taking Emergen-C ( vitamin D, C, and Zinc) daily for 5 days. Tylenol/Advil as needed for body aches or fever. If starts experiencing  shortness of breath , chest pain , high grade fever or dizziness go to the ER. 4. Sore throat  Recommended staying well hydrated and taking Emergen-C ( vitamin D, C, and Zinc) daily for 5 days. Tylenol/Advil as needed for body aches or fever. If starts experiencing  shortness of breath , chest pain , high grade fever or dizziness go to the ER. 5. Thyroid nodule  I referred her to Dr. Liliya Woods (ENT) since recent thyroid/parathyroid US has showed slight increase in nodule size and she has been feeling fatigued. -     REFERRAL TO ENT-OTOLARYNGOLOGY      SUBJECTIVE/OBJECTIVE:  HPI   The patient presents virtually today to review results of her thyroid/parathyroid US done on 21. Results show a slight interval increase in size of small thyroid nodules in the left lobe and left isthmus; however, neither nodule meets size/morphology criteria for fine needle aspiration.  She does mention feeling very tired in the past couple months. She is c/o a low grade fever and sore throat. She denies any chest tightness. She has been taking Allegra-D and Excedrin with some improvement in sxs. Her fever has resolved, but she continues to have a sore throat. She has a hx of asthma and allergies and believes her sxs may be due to allergies since she is also having some watery eyes. Her depression and anxiety have been well controlled. She has been talking to a  therapist which has been very helpful. Patient Active Problem List   Diagnosis Code    Anxiety disorder F41.9    Allergic asthma J45.909    Obesity, morbid ([facility] [address] E66.01        Current Outpatient Medications on File Prior to Visit   Medication Sig Dispense Refill    SUMAtriptan (IMITREX) 50 mg tablet 9TAKE 1 TABLET BY MOUTH ONCE AS NEEDED FOR MIGRAINE FOR UP TO 1 DOSE. 9 Tablet 3    phentermine (ADIPEX-P) 37.5 mg tablet TAKE 1/2 TABLET BY MOUTH TWICE A DAY      propranolol LA (INDERAL LA) 80 mg SR capsule Take 1 Capsule by mouth daily. 90 Capsule 1    metFORMIN ER (GLUCOPHAGE XR) 500 mg tablet Take 1,000 mg by mouth.  multivitamin (ONE A DAY) tablet Take 1 Tablet by mouth daily.  fexofenadine-pseudoephedrine (Allegra-D 24 Hour) 180-240 mg per tablet Take 1 Tab by mouth daily. No current facility-administered medications on file prior to visit.        Allergies   Allergen Reactions    Amoxicillin Hives and Other (comments)    Shellfish Containing Products Unknown (comments) and Shortness of Breath    Potato Shortness of Breath    Tomato Itching and Shortness of Breath    Grass Pollen-[place], Standard Swelling    Iodine Hives    Malt Extract Unknown (comments)    Oak Unknown (comments)    Pcn [Penicillins] Unknown (comments)    Ragweed Unknown (comments)    Yeast, Dried Unknown (comments)       Past Medical History:   Diagnosis Date    Asthma     Depression     Family history of skin cancer     maternal grandfather - melanoma    Headache(784.0)     IBS (irritable bowel syndrome) 2019    PCOS (polycystic ovarian syndrome)     PTSD (post-traumatic stress disorder)     Sun-damaged skin     Tanning bed exposure        Past Surgical History:   Procedure Laterality Date    COLONOSCOPY N/A 2/4/2019    COLONOSCOPY, EGD performed by Jeralyn Gilford, MD at [facility] ENDOSCOPY    HX TONSIL AND ADENOIDECTOMY      HX TONSILLECTOMY      HX WISDOM TEETH EXTRACTION         Family History   Problem Relation Age of Onset    Psychiatric Disorder Father     Cancer Maternal Aunt     Heart Disease Maternal Grandfather     Hypertension Maternal Grandfather     Cancer Paternal Grandmother     Diabetes Paternal Grandmother     Hypertension Paternal Grandmother        Social History     Socioeconomic History    Marital status: SINGLE     Spouse name: Not on file    Number of children: Not on file    Years of education: Not on file    Highest education level: Not on file   Occupational History    Not on file   Tobacco Use    Smoking status: Former Smoker    Smokeless tobacco: Never Used   Vaping Use    Vaping Use: Never used   Substance and Sexual Activity    Alcohol use: Yes     Alcohol/week: 4.0 standard drinks     Types: 2 Glasses of wine, 2 Cans of beer per week     Comment: socially    Drug use: No    Sexual activity: Yes     Partners: Male   Other Topics Concern    Not on file   Social History Narrative    ** Merged History Encounter **            No visits with results within 3 Month(s) from this visit. Latest known visit with results is:   Admission on 04/05/2021, Discharged on 04/06/2021   Component Date Value Ref Range Status    SAMPLES BEING HELD 04/05/2021 bl,lav,rd,sst,pst   Final    COMMENT 04/05/2021 Add-on orders for these samples will be processed based on acceptable specimen integrity and analyte stability, which may vary by analyte.     Final      Review of Systems   Constitutional: Positive for fatigue. Negative for activity change and unexpected weight change. HENT: Positive for sore throat. Negative for congestion, hearing loss and rhinorrhea. Eyes: Negative for discharge. Respiratory: Negative for cough, chest tightness and shortness of breath. Cardiovascular: Negative for leg swelling. Gastrointestinal: Negative for abdominal pain, constipation and diarrhea. Genitourinary: Negative for dysuria, flank pain, frequency and urgency. Musculoskeletal: Negative for arthralgias, back pain and myalgias. Skin: Negative for color change and rash. Neurological: Negative for dizziness, light-headedness and headaches. Psychiatric/Behavioral: Negative for dysphoric mood and sleep disturbance. The patient is not nervous/anxious.             Physical Exam    [INSTRUCTIONS:  \"[x]\" Indicates a positive item  \"[]\" Indicates a negative item  -- DELETE ALL ITEMS NOT EXAMINED]    Constitutional: [x] Appears well-developed and well-nourished [x] No apparent distress      [] Abnormal -     Mental status: [x] Alert and awake  [x] Oriented to person/place/time [x] Able to follow commands    [] Abnormal -     Eyes:   EOM    [x]  Normal    [] Abnormal -   Sclera  [x]  Normal    [] Abnormal -          Discharge [x]  None visible   [] Abnormal -     HENT: [x] Normocephalic, atraumatic  [] Abnormal -   [x] Mouth/Throat: Mucous membranes are moist    External Ears [x] Normal  [] Abnormal -    Neck: [x] No visualized mass [] Abnormal -     Pulmonary/Chest: [x] Respiratory effort normal   [x] No visualized signs of difficulty breathing or respiratory distress        [] Abnormal -      Musculoskeletal:   [x] Normal gait with no signs of ataxia         [x] Normal range of motion of neck        [] Abnormal -     Neurological:        [x] No Facial Asymmetry (Cranial nerve 7 motor function) (limited exam due to video visit)          [x] No gaze palsy        [] Abnormal -          Skin:        [x] No significant exanthematous lesions or discoloration noted on facial skin         [] Abnormal -            Psychiatric:       [x] Normal Affect [] Abnormal -        [x] No Hallucinations    Other pertinent observable physical exam findings:-       Lacy Marshall, was evaluated through a synchronous (real-time) audio-video encounter. The patient (or guardian if applicable) is aware that this is a billable service. Verbal consent to proceed has been obtained within the past 12 months. The visit was conducted pursuant to the emergency declaration under the [address] [place] [address] and the [employer] General Act. Patient identification was verified, and a caregiver was present when appropriate. The patient was located in a state where the provider was credentialed to provide care. An electronic signature was used to authenticate this note.   -- Veto Cooper

## 2022-01-11 DIAGNOSIS — H66.90 EAR INFECTION: Primary | ICD-10-CM

## 2022-01-11 DIAGNOSIS — H66.90 EAR INFECTION: ICD-10-CM

## 2022-01-11 RX ORDER — OFLOXACIN 3 MG/ML
5 SOLUTION AURICULAR (OTIC) 2 TIMES DAILY
Qty: 5 ML | Refills: 0 | Status: SHIPPED | OUTPATIENT
Start: 2022-01-11 | End: 2022-01-12 | Stop reason: SDUPTHER

## 2022-01-11 NOTE — TELEPHONE ENCOUNTER
----- Message from Marques Sotelo sent at 1/11/2022  3:10 PM EST -----  Subject: Message to Provider    QUESTIONS  Information for Provider? Patient called in stating she was seen 1/7/21,   she stated she talked to the provider that day about a possible antibiotic   but they decided to hold off, patient is now requesting one due to ear   pain on top of the other symptoms. Patient also stated she has been having   nose bleeds states believes from being so dry. Would like this script   called into [facility]. ([facility]) Kala Charles due to her being in   [place].   ---------------------------------------------------------------------------  --------------  [address]  What is the best way for the office to contact you? OK to leave message on   voicemail  Preferred Call Back Phone Number? [phone]  ---------------------------------------------------------------------------  --------------  SCRIPT ANSWERS  Relationship to Patient?  Self

## 2022-01-12 RX ORDER — OFLOXACIN 3 MG/ML
5 SOLUTION AURICULAR (OTIC) 2 TIMES DAILY
Qty: 5 ML | Refills: 0 | Status: SHIPPED | OUTPATIENT
Start: 2022-01-12 | End: 2022-01-22

## 2022-01-12 NOTE — TELEPHONE ENCOUNTER
Requested Prescriptions     Pending Prescriptions Disp Refills    ofloxacin (FLOXIN) 0.3 % otic solution 5 mL 0     Sig: Administer 5 Drops into each ear two (2) times a day for 10 days.         Last Visit 10/13/21  Last Refill 1/11/22

## 2022-01-26 ENCOUNTER — TRANSCRIBE ORDER (OUTPATIENT)
Dept: SCHEDULING | Age: 37
End: 2022-01-26

## 2022-01-26 DIAGNOSIS — R22.1 NECK MASS: ICD-10-CM

## 2022-01-26 DIAGNOSIS — K21.9 ESOPHAGEAL REFLUX: Primary | ICD-10-CM

## 2022-01-27 ENCOUNTER — TRANSCRIBE ORDER (OUTPATIENT)
Dept: SCHEDULING | Age: 37
End: 2022-01-27

## 2022-01-27 DIAGNOSIS — K21.9 ESOPHAGEAL REFLUX: Primary | ICD-10-CM

## 2022-01-27 DIAGNOSIS — R22.1 NECK MASS: ICD-10-CM

## 2022-02-07 ENCOUNTER — HOSPITAL ENCOUNTER (OUTPATIENT)
Dept: GENERAL RADIOLOGY | Age: 37
Discharge: HOME | End: 2022-02-07
Attending: OTOLARYNGOLOGY
Payer: COMMERCIAL

## 2022-02-07 ENCOUNTER — HOSPITAL ENCOUNTER (OUTPATIENT)
Dept: CT IMAGING | Age: 37
End: 2022-02-07
Attending: OTOLARYNGOLOGY
Payer: COMMERCIAL

## 2022-02-07 DIAGNOSIS — K21.9 ESOPHAGEAL REFLUX: ICD-10-CM

## 2022-02-07 PROCEDURE — 74220 X-RAY XM ESOPHAGUS 1CNTRST: CPT

## 2022-02-11 ENCOUNTER — HOSPITAL ENCOUNTER (OUTPATIENT)
Dept: CT IMAGING | Age: 37
Discharge: HOME | End: 2022-02-11
Attending: OTOLARYNGOLOGY
Payer: COMMERCIAL

## 2022-02-11 DIAGNOSIS — K21.9 ESOPHAGEAL REFLUX: ICD-10-CM

## 2022-02-11 DIAGNOSIS — R22.1 NECK MASS: ICD-10-CM

## 2022-02-11 LAB — CREAT BLD-MCNC: 0.9 MG/DL (ref 0.6–1.3)

## 2022-02-11 PROCEDURE — 74011000636 HC RX REV CODE- 636: Performed by: RADIOLOGY

## 2022-02-11 PROCEDURE — 70491 CT SOFT TISSUE NECK W/DYE: CPT

## 2022-02-11 PROCEDURE — 82565 ASSAY OF CREATININE: CPT

## 2022-02-11 RX ADMIN — IOPAMIDOL 100 ML: 612 INJECTION, SOLUTION INTRAVENOUS at 16:18

## 2022-03-20 PROBLEM — E66.01 OBESITY, MORBID (HCC): Status: ACTIVE | Noted: 2018-09-10

## 2022-04-05 RX ORDER — ALBUTEROL SULFATE 90 UG/1
AEROSOL, METERED RESPIRATORY (INHALATION)
Qty: 18 EACH | Refills: 1 | Status: SHIPPED | OUTPATIENT
Start: 2022-04-05

## 2022-04-26 RX ORDER — SUMATRIPTAN 50 MG/1
TABLET, FILM COATED ORAL
Qty: 9 TABLET | Refills: 1 | Status: SHIPPED | OUTPATIENT
Start: 2022-04-26

## 2022-06-02 ENCOUNTER — TELEPHONE (OUTPATIENT)
Dept: PRIMARY CARE CLINIC | Age: 37
End: 2022-06-02

## 2022-06-02 ENCOUNTER — NURSE TRIAGE (OUTPATIENT)
Dept: OTHER | Facility: CLINIC | Age: 37
End: 2022-06-02

## 2022-06-02 NOTE — TELEPHONE ENCOUNTER
Spoke with pt and told her that a VV wouldn't be the best idea since she cant move and is in a lot of pain and that she should go to the ED and she said she's not going to the ED right now, she will lay out for a few days and if needed she will go later.

## 2022-06-02 NOTE — TELEPHONE ENCOUNTER
Received call from Tanya at [facility] with Red Flag Complaint. Subjective: Caller states \" I was lifting boxes yesterday, some pretty heavy ones. This morning I was trying to get up and my back spasmed and my legs gave out. It was like everything locked up. I couldn't move. I couldn't get into an upright position. I iced it, put heat on it and sat for about an hour and half. I had to crawl to the bathroom and then made it to my bed. Now I cannot even get out of my bed to go to the bathroom. Urine looked fine so I don't think it is kidney related. I will get sweaty from the pain when I stand but if I am laying I am fine. I feel like my legs can bare weight but my hips cant. \"      Current Symptoms: lower back pain- worse on L side      Onset: this morning    unchanged    Associated Symptoms: reduced activity    Pain Severity: 10/10 with moving sharp pain with spasms or severe citlali hoarse or knife feeling , 6/10 with laying flat dull ache    Temperature: denies     What has been tried: ibuprofen       Recommended disposition: Go to ED/UCC Now (Or to Office with PCP Approval)- Pt states that if she had to go anywhere it would have to be by ambulance. Pt is requesting a VV if possible. Care advice provided, patient verbalizes understanding; denies any other questions or concerns; instructed to call back for any new or worsening symptoms. Writer provided warm transfer to [facility]  at [employer]  for further recommendation     Attention Provider: Thank you for allowing me to participate in the care of your patient. The patient was connected to triage in response to information provided to the [facility]. Please do not respond through this encounter as the response is not directed to a shared pool.       Reason for Disposition   Back pain from overuse (work, exercise, gardening) OR from twisting, lifting, or bending injury   Patient sounds very sick or weak to the triager    Protocols used: BACK INJURY-ADULT-[place], BACK PAIN-ADULT-[place]

## 2022-06-02 NOTE — TELEPHONE ENCOUNTER
Spoke with pt and she said she was lifting heavy boxes yesterday and today when he got up from the couch her hip gave out, like a muscle spasm. Pain in both hips, more on left side. Pain is 8-9 when she moves. She is staying still not moving right now. She had been putting ice and she stated \"lot and lots of pain killers\", I asked what the pain killers were and she said she found an old bottle of robaxin and has been talking that and tylenol. She stated she cant get up and is not calling an ambulance. I told her I would send a message back to Dr. Veronica Ha and told her Veronica Ha is out of the office today and I would also send a message to [facility] [place], pt stated ok.

## 2022-06-02 NOTE — TELEPHONE ENCOUNTER
Patient was transferred by Nurse triage from [facility] ([facility]) saying she needs an appointment immediately. The patient fell and hurt her back and cannot get out of the bed because she is in so much pain. I advised the nurse and the patient that she needs to call an ambulance or someone to take her to urgent care or the ER if she's in so much pain. I told her I would send a message to Dr Elena Landa and her nurse to see if we can see her tomorrow.   She said she would find someone to take her to [facility].

## 2022-08-10 ENCOUNTER — TRANSCRIBE ORDER (OUTPATIENT)
Dept: SCHEDULING | Age: 37
End: 2022-08-10

## 2022-08-10 DIAGNOSIS — D34 THYROID ADENOMA: Primary | ICD-10-CM

## 2022-08-15 ENCOUNTER — HOSPITAL ENCOUNTER (OUTPATIENT)
Dept: ULTRASOUND IMAGING | Age: 37
Discharge: HOME | End: 2022-08-15
Attending: OTOLARYNGOLOGY
Payer: COMMERCIAL

## 2022-08-15 DIAGNOSIS — D34 THYROID ADENOMA: ICD-10-CM

## 2022-08-15 PROCEDURE — 76536 US EXAM OF HEAD AND NECK: CPT

## 2022-09-09 ENCOUNTER — VIRTUAL VISIT (OUTPATIENT)
Dept: PRIMARY CARE CLINIC | Age: 37
End: 2022-09-09
Payer: COMMERCIAL

## 2022-09-09 DIAGNOSIS — J32.0 SINUSITIS, MAXILLARY, CHRONIC: Primary | ICD-10-CM

## 2022-09-09 DIAGNOSIS — J45.909 ASTHMA DUE TO ENVIRONMENTAL ALLERGIES: ICD-10-CM

## 2022-09-09 DIAGNOSIS — G43.009 MIGRAINE WITHOUT AURA AND WITHOUT STATUS MIGRAINOSUS, NOT INTRACTABLE: ICD-10-CM

## 2022-09-09 DIAGNOSIS — E04.1 THYROID NODULE: ICD-10-CM

## 2022-09-09 PROCEDURE — 99214 OFFICE O/P EST MOD 30 MIN: CPT | Performed by: INTERNAL MEDICINE

## 2022-09-09 RX ORDER — DOXYCYCLINE 100 MG/1
100 CAPSULE ORAL 2 TIMES DAILY
Qty: 20 CAPSULE | Refills: 0 | Status: SHIPPED | OUTPATIENT
Start: 2022-09-09 | End: 2022-09-19

## 2022-09-09 RX ORDER — METHYLPREDNISOLONE 4 MG/1
4 TABLET ORAL
Qty: 1 DOSE PACK | Refills: 0 | Status: SHIPPED | OUTPATIENT
Start: 2022-09-09 | End: 2022-09-15

## 2022-09-09 NOTE — PROGRESS NOTES
Mary Redding (: 1985) is a 39 y.o. female, established patient, here for evaluation of the following chief complaint(s):   Sinus Infection     I, Carter Bullock MD, personally performed the services described in this documentation as scribed by Lorena Sethi in my presence, and it is both accurate and complete. ASSESSMENT/PLAN:  Below is the assessment and plan developed based on review of pertinent history, labs, studies, and medications. 1. Sinusitis, maxillary, chronic  I rx'd medrol dosepack 4 mg as directed. Potential side effects were discussed. I rx'd doxycycline 100 mg BID. Potential side effects were discussed. -     methylPREDNISolone (MEDROL DOSEPACK) 4 mg tablet; Take 1 Tablet by mouth Specific Days and Specific Times for 6 days. , Normal, Disp-1 Dose Pack, R-0 sent to pharmacy. -     doxycycline (VIBRAMYCIN) 100 mg capsule; Take 1 Capsule by mouth two (2) times a day for 10 days. , Normal, Disp-20 Capsule, R-0 sent to pharmacy. 2. Asthma due to environmental allergies  I rx'd medrol dosepack 4 mg as directed. Potential side effects were discussed. -     methylPREDNISolone (MEDROL DOSEPACK) 4 mg tablet; Take 1 Tablet by mouth Specific Days and Specific Times for 6 days. , Normal, Disp-1 Dose Pack, R-0 sent to pharmacy. 3. Migraine without aura and without status migrainosus, not intractable  Followed with Dr. Luci Alcantara (Neurology). Continue on Imitrex 50 mg prn and propranolol LA 80 mg SR daily. 4. Thyroid nodule  Followed by Dr. Arelis Villanueva (Otolaryngology). SUBJECTIVE/OBJECTIVE:      The pt presents today virtually c/o hives, sinus infection, sinus bleeding when blowing nose. She notes that she is allergic to dogs and pet-sat for 2 weeks, 1 month ago. She is using Allegra-D and Benadryl for her allergies. She was wheezing at the time of dog-sitting and was using an albuterol inhaler. She is not wheezing anymore, but she has sinus bleeding and is using a neti pot. She has not followed with Dr. Madeleine Davies (Neurology) lately. She is on Imitrex 50 mg prn and propranolol LA 80 mg SR daily for migraines. Followed by Dr. Gideon Zhu (Otolaryngology) for Thyroid adenoma. She has a US Thyroid done on 08/15/22. Patient Active Problem List   Diagnosis Code    Anxiety disorder F41.9    Allergic asthma J45.909    Obesity, morbid (HCC) E66.01    Migraine without aura and without status migrainosus, not intractable G43.009    Thyroid nodule E04.1        Current Outpatient Medications on File Prior to Visit   Medication Sig Dispense Refill    SUMAtriptan (IMITREX) 50 mg tablet 9TAKE 1 TABLET BY MOUTH ONCE AS NEEDED FOR MIGRAINE FOR UP TO 1 DOSE. 9 Tablet 1    albuterol (PROVENTIL HFA, VENTOLIN HFA, PROAIR HFA) 90 mcg/actuation inhaler INHALE 1 PUFF EVERY 6 HOURS AS NEEDED FOR WHEEZING FOR UP TO 10 DAYS 18 Each 1    phentermine (ADIPEX-P) 37.5 mg tablet TAKE 1/2 TABLET BY MOUTH TWICE A DAY      propranolol LA (INDERAL LA) 80 mg SR capsule Take 1 Capsule by mouth daily. 90 Capsule 1    metFORMIN ER (GLUCOPHAGE XR) 500 mg tablet Take 1,000 mg by mouth.      multivitamin (ONE A DAY) tablet Take 1 Tablet by mouth daily. fexofenadine-pseudoephedrine (Allegra-D 24 Hour) 180-240 mg per tablet Take 1 Tab by mouth daily. No current facility-administered medications on file prior to visit.        Allergies   Allergen Reactions    Amoxicillin Hives and Other (comments)    Shellfish Containing Products Unknown (comments) and Shortness of Breath    Potato Shortness of Breath    Tomato Itching and Shortness of Breath    Grass Pollen-[place], Standard Swelling    Malt Extract Unknown (comments)    Oak Unknown (comments)    Pcn [Penicillins] Unknown (comments)    Ragweed Unknown (comments)    Yeast, Dried Unknown (comments)       Past Medical History:   Diagnosis Date    Asthma     Depression     Family history of skin cancer     maternal grandfather - melanoma    Headache(784.0)     IBS (irritable bowel syndrome) 2019    PCOS (polycystic ovarian syndrome)     PTSD (post-traumatic stress disorder)     Sun-damaged skin     Tanning bed exposure        Past Surgical History:   Procedure Laterality Date    COLONOSCOPY N/A 2/4/2019    COLONOSCOPY, EGD performed by Kristopher Armendariz MD at [facility] ENDOSCOPY    HX TONSIL AND ADENOIDECTOMY      HX TONSILLECTOMY      HX WISDOM TEETH EXTRACTION         Family History   Problem Relation Age of Onset    Psychiatric Disorder Father     Cancer Maternal Aunt     Heart Disease Maternal Grandfather     Hypertension Maternal Grandfather     Cancer Paternal Grandmother     Diabetes Paternal Grandmother     Hypertension Paternal Grandmother        Social History     Socioeconomic History    Marital status: SINGLE     Spouse name: Not on file    Number of children: Not on file    Years of education: Not on file    Highest education level: Not on file   Occupational History    Not on file   Tobacco Use    Smoking status: Former    Smokeless tobacco: Never   Vaping Use    Vaping Use: Never used   Substance and Sexual Activity    Alcohol use: Yes     Alcohol/week: 4.0 standard drinks     Types: 2 Glasses of wine, 2 Cans of beer per week     Comment: socially    Drug use: No    Sexual activity: Yes     Partners: Male   Other Topics Concern    Not on file   Social History Narrative    ** Merged History Encounter **            No visits with results within 3 Month(s) from this visit.    Latest known visit with results is:   Hospital Outpatient Visit on 02/11/2022   Component Date Value Ref Range Status    Creatinine (POC) 02/11/2022 0.90  0.6 - 1.3 mg/dL Final    GFRAA, POC 02/11/2022 >60  >60 ml/min/1.73m2 Final    GFRNA, POC 02/11/2022 >60  >60 ml/min/1.73m2 Final    Estimated GFR is calculated using the IDMS-traceable Modification of Diet in Renal Disease (MDRD) Study equation, reported for both  Americans (GFRAA) and non- Americans (GFRNA), and normalized to 1.73m2 body surface area. The physician must decide which value applies to the patient. Review of Systems   Constitutional:  Negative for activity change, fatigue and unexpected weight change. HENT:  Positive for congestion and nosebleeds. Negative for hearing loss, rhinorrhea and sore throat. Eyes:  Negative for discharge. Respiratory:  Negative for cough, chest tightness and shortness of breath. Cardiovascular:  Negative for leg swelling. Gastrointestinal:  Negative for abdominal pain, constipation and diarrhea. Genitourinary:  Negative for dysuria, flank pain, frequency and urgency. Musculoskeletal:  Negative for arthralgias, back pain and myalgias. Skin:  Positive for rash. Negative for color change. Neurological:  Negative for dizziness, light-headedness and headaches. Psychiatric/Behavioral:  Negative for dysphoric mood and sleep disturbance. The patient is not nervous/anxious.            Physical Exam    [INSTRUCTIONS:  \"[x]\" Indicates a positive item  \"[]\" Indicates a negative item  -- DELETE ALL ITEMS NOT EXAMINED]    Constitutional: [x] Appears well-developed and well-nourished [x] No apparent distress      [] Abnormal -     Mental status: [x] Alert and awake  [x] Oriented to person/place/time [x] Able to follow commands    [] Abnormal -     Eyes:   EOM    [x]  Normal    [] Abnormal -   Sclera  [x]  Normal    [] Abnormal -          Discharge [x]  None visible   [] Abnormal -     HENT: [x] Normocephalic, atraumatic  [] Abnormal -   [x] Mouth/Throat: Mucous membranes are moist    External Ears [x] Normal  [] Abnormal -    Neck: [x] No visualized mass [] Abnormal -     Pulmonary/Chest: [x] Respiratory effort normal   [x] No visualized signs of difficulty breathing or respiratory distress        [] Abnormal -      Musculoskeletal:   [x] Normal gait with no signs of ataxia         [x] Normal range of motion of neck        [] Abnormal -     Neurological:        [x] No Facial Asymmetry (Cranial nerve 7 motor function) (limited exam due to video visit)          [x] No gaze palsy        [] Abnormal -          Skin:        [x] No significant exanthematous lesions or discoloration noted on facial skin         [] Abnormal -            Psychiatric:       [x] Normal Affect [] Abnormal -        [x] No Hallucinations    Other pertinent observable physical exam findings:-    Lili Jdud, was evaluated through a synchronous (real-time) audio-video encounter. The patient (or guardian if applicable) is aware that this is a billable service, which includes applicable co-pays. This Virtual Visit was conducted with patient's (and/or legal guardian's) consent. The visit was conducted pursuant to the emergency declaration under the [address] [place] [address] authority and the [employer] and [employer] Act. Patient identification was verified, and a caregiver was present when appropriate. The patient was located at: Home: [address]  The provider was located at: [facility] [address] [place] [address]       An electronic signature was used to authenticate this note.   -- Moe Morillo

## 2022-12-12 RX ORDER — PROPRANOLOL HYDROCHLORIDE 80 MG/1
80 CAPSULE, EXTENDED RELEASE ORAL DAILY
Qty: 30 CAPSULE | Refills: 1 | Status: SHIPPED | OUTPATIENT
Start: 2022-12-12

## 2023-06-21 ENCOUNTER — NURSE TRIAGE (OUTPATIENT)
Dept: OTHER | Facility: CLINIC | Age: 38
End: 2023-06-21

## 2023-06-23 ENCOUNTER — OFFICE VISIT (OUTPATIENT)
Dept: PRIMARY CARE CLINIC | Facility: CLINIC | Age: 38
End: 2023-06-23
Payer: COMMERCIAL

## 2023-06-23 VITALS
WEIGHT: 247 LBS | SYSTOLIC BLOOD PRESSURE: 109 MMHG | HEART RATE: 96 BPM | BODY MASS INDEX: 39.7 KG/M2 | DIASTOLIC BLOOD PRESSURE: 79 MMHG | RESPIRATION RATE: 17 BRPM | HEIGHT: 66 IN | OXYGEN SATURATION: 99 % | TEMPERATURE: 97.8 F

## 2023-06-23 DIAGNOSIS — R09.81 SINUS CONGESTION: ICD-10-CM

## 2023-06-23 DIAGNOSIS — H92.03 EAR PAIN, BILATERAL: ICD-10-CM

## 2023-06-23 DIAGNOSIS — G43.109 OCULAR MIGRAINE: Primary | ICD-10-CM

## 2023-06-23 DIAGNOSIS — R42 DIZZINESS: ICD-10-CM

## 2023-06-23 PROBLEM — G43.009 MIGRAINE WITHOUT AURA AND WITHOUT STATUS MIGRAINOSUS, NOT INTRACTABLE: Status: RESOLVED | Noted: 2022-09-09 | Resolved: 2023-06-23

## 2023-06-23 PROCEDURE — 99214 OFFICE O/P EST MOD 30 MIN: CPT | Performed by: INTERNAL MEDICINE

## 2023-06-23 RX ORDER — AZELASTINE 1 MG/ML
1 SPRAY, METERED NASAL 2 TIMES DAILY
Qty: 60 ML | Refills: 1 | Status: SHIPPED | OUTPATIENT
Start: 2023-06-23

## 2023-06-23 RX ORDER — BIOTIN 10 MG
10 TABLET ORAL DAILY
COMMUNITY

## 2023-06-23 RX ORDER — HYDROCORTISONE AND ACETIC ACID 20.75; 10.375 MG/ML; MG/ML
3 SOLUTION AURICULAR (OTIC) 3 TIMES DAILY
Qty: 10 ML | Refills: 0 | Status: SHIPPED | OUTPATIENT
Start: 2023-06-23 | End: 2023-07-03

## 2023-06-23 RX ORDER — SUMATRIPTAN 50 MG/1
50 TABLET, FILM COATED ORAL AS NEEDED
Qty: 9 TABLET | Refills: 1 | Status: SHIPPED | OUTPATIENT
Start: 2023-06-23

## 2023-06-23 SDOH — ECONOMIC STABILITY: TRANSPORTATION INSECURITY
IN THE PAST 12 MONTHS, HAS LACK OF TRANSPORTATION KEPT YOU FROM MEETINGS, WORK, OR FROM GETTING THINGS NEEDED FOR DAILY LIVING?: NO

## 2023-06-23 SDOH — ECONOMIC STABILITY: FOOD INSECURITY: WITHIN THE PAST 12 MONTHS, THE FOOD YOU BOUGHT JUST DIDN'T LAST AND YOU DIDN'T HAVE MONEY TO GET MORE.: NEVER TRUE

## 2023-06-23 SDOH — ECONOMIC STABILITY: FOOD INSECURITY: WITHIN THE PAST 12 MONTHS, YOU WORRIED THAT YOUR FOOD WOULD RUN OUT BEFORE YOU GOT MONEY TO BUY MORE.: NEVER TRUE

## 2023-06-23 SDOH — ECONOMIC STABILITY: HOUSING INSECURITY
IN THE LAST 12 MONTHS, WAS THERE A TIME WHEN YOU DID NOT HAVE A STEADY PLACE TO SLEEP OR SLEPT IN A SHELTER (INCLUDING NOW)?: NO

## 2023-06-23 SDOH — ECONOMIC STABILITY: INCOME INSECURITY: HOW HARD IS IT FOR YOU TO PAY FOR THE VERY BASICS LIKE FOOD, HOUSING, MEDICAL CARE, AND HEATING?: PATIENT DECLINED

## 2023-06-23 ASSESSMENT — PATIENT HEALTH QUESTIONNAIRE - PHQ9
SUM OF ALL RESPONSES TO PHQ QUESTIONS 1-9: 0
SUM OF ALL RESPONSES TO PHQ QUESTIONS 1-9: 0
2. FEELING DOWN, DEPRESSED OR HOPELESS: 0
SUM OF ALL RESPONSES TO PHQ QUESTIONS 1-9: 0
1. LITTLE INTEREST OR PLEASURE IN DOING THINGS: 0
SUM OF ALL RESPONSES TO PHQ QUESTIONS 1-9: 0
SUM OF ALL RESPONSES TO PHQ9 QUESTIONS 1 & 2: 0

## 2023-06-23 ASSESSMENT — ENCOUNTER SYMPTOMS
EYE DISCHARGE: 0
CONSTIPATION: 0
DIARRHEA: 0
SORE THROAT: 0
SHORTNESS OF BREATH: 0
COLOR CHANGE: 0
BACK PAIN: 0
ABDOMINAL PAIN: 0
CHEST TIGHTNESS: 0
COUGH: 0
RHINORRHEA: 0

## 2023-06-23 NOTE — PROGRESS NOTES
Health Decision Maker has been checked with the patient      Patient has stated that the scribe can come in room    Chief Complaint   Patient presents with    Otalgia     Since Friday last week    Headache     Depression: Not at risk    PHQ-2 Score: 0      /79 (Site: Left Upper Arm)   Pulse 96   Temp 97.8 °F (36.6 °C)   Resp 17   Ht 5' 5.5\" (1.664 m)   Wt 247 lb (112 kg)   LMP 06/08/2023 (Exact Date)   SpO2 99%   BMI 40.48 kg/m²     1. \"Have you been to the ER, urgent care clinic since your last visit? Hospitalized since your last visit? \" No    2. \"Have you seen or consulted any other health care providers outside of the [address] [place] [address] since your last visit? \" Yes [facility]       3. For patients aged 39-70: Has the patient had a colonoscopy / FIT/ Cologuard? NA - based on age      If the patient is female:    4. For patients aged 41-77: Has the patient had a mammogram within the past 2 years? NA - based on age or sex      11. For patients aged 21-65: Has the patient had a pap smear? Yes - Care Gap present.  Most recent result on file
Lymphocytes % 12/01/2020 34  12 - 49 % Final    Monocytes % 12/01/2020 7  5 - 13 % Final    Eosinophils % 12/01/2020 2  0 - 7 % Final    Basophils % 12/01/2020 1  0 - 1 % Final    Immature Granulocytes 12/01/2020 0  0.0 - 0.5 % Final    Neutrophils Absolute 12/01/2020 5.0  1.8 - 8.0 K/UL Final    Lymphocytes Absolute 12/01/2020 3.0  0.8 - 3.5 K/UL Final    Monocytes Absolute 12/01/2020 0.6  0.0 - 1.0 K/UL Final    Eosinophils Absolute 12/01/2020 0.2  0.0 - 0.4 K/UL Final    Basophils Absolute 12/01/2020 0.1  0.0 - 0.1 K/UL Final    Granulocyte Absolute Count 12/01/2020 0.0  0.00 - 0.04 K/UL Final    Differential Type 12/01/2020 AUTOMATED    Final    Sodium 12/01/2020 140  136 - 145 mmol/L Final    Potassium 12/01/2020 4.2  3.5 - 5.1 mmol/L Final    Chloride 12/01/2020 104  97 - 108 mmol/L Final    CO2 12/01/2020 31  21 - 32 mmol/L Final    Anion Gap 12/01/2020 5  5 - 15 mmol/L Final    Glucose 12/01/2020 92  65 - 100 mg/dL Final    BUN 12/01/2020 16  6 - 20 MG/DL Final    Creatinine 12/01/2020 0.88  0.55 - 1.02 MG/DL Final    Bun/Cre Ratio 12/01/2020 18  12 - 20   Final    GFR  12/01/2020 >60  >60 ml/min/1.73m2 Final    EGFR IF NonAfrican American 12/01/2020 >60  >60 ml/min/1.73m2 Final    Comment: Estimated GFR is calculated using the IDMS-traceable Modification of Diet in  Renal Disease (MDRD) Study equation, reported for both  Americans  (GFRAA) and non- Americans (GFRNA), and normalized to 1.73m2 body  surface area. The physician must decide which value applies to the patient.       Calcium 12/01/2020 9.4  8.5 - 10.1 MG/DL Final    Total Bilirubin 12/01/2020 0.2  0.2 - 1.0 MG/DL Final    ALT 12/01/2020 23  12 - 78 U/L Final    AST 12/01/2020 13 (L)  15 - 37 U/L Final    Alkaline Phosphatase 12/01/2020 69  45 - 117 U/L Final    Total Protein 12/01/2020 7.0  6.4 - 8.2 g/dL Final    Albumin 12/01/2020 3.6  3.5 - 5.0 g/dL Final    Globulin 12/01/2020 3.4  2.0 - 4.0 g/dL Final

## 2023-11-14 ENCOUNTER — TELEMEDICINE (OUTPATIENT)
Dept: PRIMARY CARE CLINIC | Facility: CLINIC | Age: 38
End: 2023-11-14
Payer: COMMERCIAL

## 2023-11-14 DIAGNOSIS — J01.00 ACUTE NON-RECURRENT MAXILLARY SINUSITIS: Primary | ICD-10-CM

## 2023-11-14 DIAGNOSIS — R09.81 NASAL CONGESTION: ICD-10-CM

## 2023-11-14 DIAGNOSIS — J34.89 SINUS PRESSURE: ICD-10-CM

## 2023-11-14 PROCEDURE — 99213 OFFICE O/P EST LOW 20 MIN: CPT | Performed by: INTERNAL MEDICINE

## 2023-11-14 RX ORDER — DOXYCYCLINE HYCLATE 100 MG
100 TABLET ORAL 2 TIMES DAILY
Qty: 14 TABLET | Refills: 0 | Status: SHIPPED | OUTPATIENT
Start: 2023-11-14 | End: 2023-11-21

## 2023-11-14 RX ORDER — AZELASTINE 1 MG/ML
1 SPRAY, METERED NASAL 2 TIMES DAILY
Qty: 60 ML | Refills: 1 | Status: SHIPPED | OUTPATIENT
Start: 2023-11-14

## 2023-11-14 ASSESSMENT — ENCOUNTER SYMPTOMS
BACK PAIN: 0
DIARRHEA: 0
SORE THROAT: 0
COLOR CHANGE: 0
EYE DISCHARGE: 0
SHORTNESS OF BREATH: 0
ABDOMINAL PAIN: 0
CHEST TIGHTNESS: 0
COUGH: 0
CONSTIPATION: 0
SINUS PRESSURE: 1
RHINORRHEA: 1

## 2023-11-14 NOTE — PROGRESS NOTES
[address] [place] [address] (:  1985) is a Established patient, here for evaluation of the following:  Nasal Congestion      Assessment & Plan   Below is the assessment and plan developed based on review of pertinent history, physical exam, labs, studies, and medications. 1. Acute non-recurrent maxillary sinusitis  -     doxycycline hyclate (VIBRA-TABS) 100 MG tablet; Take 1 tablet by mouth 2 times daily for 7 days, Disp-14 tablet, R-0Normal sent to pharmacy. I prescribed doxycycline. Potential side effects were discussed. She should also take a probiotic. 2. Nasal congestion  -     azelastine (ASTELIN) 0.1 % nasal spray; 1 spray by Nasal route 2 times daily Use in each nostril as directed, Disp-60 mL, R-1Normal sent to pharmacy. I prescribed Astelin. Potential side effects were discussed. She should use this with Flonase. I recommend that she use nasal saline if she has dryness. 3. Sinus pressure  -     azelastine (ASTELIN) 0.1 % nasal spray; 1 spray by Nasal route 2 times daily Use in each nostril as directed, Disp-60 mL, R-1Normal sent to pharmacy. I prescribed Astelin. Potential side effects were discussed. She should use this with Flonase. Subjective   HPI    Patient presents today for nasal congestion. She reports that she has a fever of 99.3 degrees. She has brown phlegm and mucus and she feels tension in her face. She notes that her sinuses are tender to palpation and she has sinus pressure. She has taken Flonase, pseudoephedrine, and Allegra.         Patient Active Problem List   Diagnosis    Allergic asthma    Anxiety disorder    Obesity, morbid ([address])    Thyroid nodule    Ocular migraine        Current Outpatient Medications on File Prior to Visit   Medication Sig Dispense Refill    Multiple Vitamins-Minerals (MULTIVITAL PO) Take by mouth      Biotin 10 MG tablet Take 1 tablet by mouth daily      SUMAtriptan (IMITREX) 50 MG tablet Take 1 tablet by mouth as needed for

## 2024-03-26 ENCOUNTER — OFFICE VISIT (OUTPATIENT)
Dept: PRIMARY CARE CLINIC | Facility: CLINIC | Age: 39
End: 2024-03-26
Payer: COMMERCIAL

## 2024-03-26 VITALS
TEMPERATURE: 97.1 F | DIASTOLIC BLOOD PRESSURE: 73 MMHG | RESPIRATION RATE: 18 BRPM | WEIGHT: 253 LBS | BODY MASS INDEX: 40.66 KG/M2 | OXYGEN SATURATION: 98 % | SYSTOLIC BLOOD PRESSURE: 104 MMHG | HEIGHT: 66 IN | HEART RATE: 95 BPM

## 2024-03-26 DIAGNOSIS — Z00.00 PHYSICAL EXAM: ICD-10-CM

## 2024-03-26 DIAGNOSIS — E66.01 MORBIDLY OBESE (HCC): ICD-10-CM

## 2024-03-26 DIAGNOSIS — F32.A ANXIETY AND DEPRESSION: ICD-10-CM

## 2024-03-26 DIAGNOSIS — G43.019 INTRACTABLE MIGRAINE WITHOUT AURA AND WITHOUT STATUS MIGRAINOSUS: Primary | ICD-10-CM

## 2024-03-26 DIAGNOSIS — F41.9 ANXIETY AND DEPRESSION: ICD-10-CM

## 2024-03-26 DIAGNOSIS — R73.02 IGT (IMPAIRED GLUCOSE TOLERANCE): ICD-10-CM

## 2024-03-26 PROCEDURE — 99214 OFFICE O/P EST MOD 30 MIN: CPT | Performed by: INTERNAL MEDICINE

## 2024-03-26 RX ORDER — BUPROPION HYDROCHLORIDE 100 MG/1
100 TABLET, EXTENDED RELEASE ORAL 2 TIMES DAILY
Qty: 60 TABLET | Refills: 3 | Status: SHIPPED | OUTPATIENT
Start: 2024-03-26

## 2024-03-26 ASSESSMENT — ENCOUNTER SYMPTOMS
RHINORRHEA: 0
BACK PAIN: 0
CHEST TIGHTNESS: 0
SORE THROAT: 0
SHORTNESS OF BREATH: 0
DIARRHEA: 0
COLOR CHANGE: 0
EYE DISCHARGE: 0
ABDOMINAL PAIN: 0
COUGH: 0
CONSTIPATION: 0

## 2024-03-26 ASSESSMENT — PATIENT HEALTH QUESTIONNAIRE - PHQ9
SUM OF ALL RESPONSES TO PHQ QUESTIONS 1-9: 10
4. FEELING TIRED OR HAVING LITTLE ENERGY: NEARLY EVERY DAY
2. FEELING DOWN, DEPRESSED OR HOPELESS: NEARLY EVERY DAY
3. TROUBLE FALLING OR STAYING ASLEEP: NOT AT ALL
SUM OF ALL RESPONSES TO PHQ QUESTIONS 1-9: 10
SUM OF ALL RESPONSES TO PHQ9 QUESTIONS 1 & 2: 3
6. FEELING BAD ABOUT YOURSELF - OR THAT YOU ARE A FAILURE OR HAVE LET YOURSELF OR YOUR FAMILY DOWN: NOT AT ALL
8. MOVING OR SPEAKING SO SLOWLY THAT OTHER PEOPLE COULD HAVE NOTICED. OR THE OPPOSITE, BEING SO FIGETY OR RESTLESS THAT YOU HAVE BEEN MOVING AROUND A LOT MORE THAN USUAL: SEVERAL DAYS
7. TROUBLE CONCENTRATING ON THINGS, SUCH AS READING THE NEWSPAPER OR WATCHING TELEVISION: NOT AT ALL
1. LITTLE INTEREST OR PLEASURE IN DOING THINGS: NOT AT ALL
5. POOR APPETITE OR OVEREATING: NEARLY EVERY DAY
SUM OF ALL RESPONSES TO PHQ QUESTIONS 1-9: 10
9. THOUGHTS THAT YOU WOULD BE BETTER OFF DEAD, OR OF HURTING YOURSELF: NOT AT ALL
SUM OF ALL RESPONSES TO PHQ QUESTIONS 1-9: 10
10. IF YOU CHECKED OFF ANY PROBLEMS, HOW DIFFICULT HAVE THESE PROBLEMS MADE IT FOR YOU TO DO YOUR WORK, TAKE CARE OF THINGS AT HOME, OR GET ALONG WITH OTHER PEOPLE: EXTREMELY DIFFICULT

## 2024-03-26 NOTE — PROGRESS NOTES
\"Have you been to the ER, urgent care clinic since your last visit?  Hospitalized since your last visit?\"    NO    “Have you seen or consulted any other health care providers outside of [facility] since your last visit?”    NO         Chief Complaint   Patient presents with    Annual Exam    Depression       Pt is ok with scribe.     Depression: At risk (3/26/2024)    PHQ-2     PHQ-2 Score: 10      
American 12/01/2020 >60  >60 ml/min/1.73m2 Final    Comment: Estimated GFR is calculated using the IDMS-traceable Modification of Diet in  Renal Disease (MDRD) Study equation, reported for both  Americans  (GFRAA) and non- Americans (GFRNA), and normalized to 1.73m2 body  surface area. The physician must decide which value applies to the patient.      Calcium 12/01/2020 9.4  8.5 - 10.1 MG/DL Final    Total Bilirubin 12/01/2020 0.2  0.2 - 1.0 MG/DL Final    ALT 12/01/2020 23  12 - 78 U/L Final    AST 12/01/2020 13 (L)  15 - 37 U/L Final    Alkaline Phosphatase 12/01/2020 69  45 - 117 U/L Final    Total Protein 12/01/2020 7.0  6.4 - 8.2 g/dL Final    Albumin 12/01/2020 3.6  3.5 - 5.0 g/dL Final    Globulin 12/01/2020 3.4  2.0 - 4.0 g/dL Final    Albumin/Globulin Ratio 12/01/2020 1.1  1.1 - 2.2   Final         Review of Systems   Constitutional:  Negative for activity change, fatigue and unexpected weight change.   HENT:  Negative for congestion, hearing loss, rhinorrhea and sore throat.    Eyes:  Negative for discharge.   Respiratory:  Negative for cough, chest tightness and shortness of breath.    Gastrointestinal:  Negative for abdominal pain, constipation and diarrhea.   Genitourinary:  Negative for dysuria, flank pain, frequency and urgency.   Musculoskeletal:  Negative for arthralgias, back pain and myalgias.   Skin:  Negative for color change and rash.   Neurological:  Positive for headaches. Negative for dizziness and light-headedness.   Psychiatric/Behavioral:  Positive for dysphoric mood. Negative for sleep disturbance. The patient is nervous/anxious.           /73 (Site: Left Upper Arm, Position: Sitting)   Pulse 95   Temp 97.1 °F (36.2 °C) (Temporal)   Resp 18   Ht 1.664 m (5' 5.5\")   Wt 114.8 kg (253 lb)   LMP 03/09/2024   SpO2 98%   BMI 41.46 kg/m²     Physical Exam  Vitals and nursing note reviewed.   Constitutional:       General: She is not in acute distress.     Appearance:

## 2024-03-28 ENCOUNTER — HOSPITAL ENCOUNTER (OUTPATIENT)
Facility: HOSPITAL | Age: 39
Discharge: HOME | End: 2024-03-31

## 2024-03-28 DIAGNOSIS — Z00.00 PHYSICAL EXAM: ICD-10-CM

## 2024-03-28 DIAGNOSIS — R73.02 IGT (IMPAIRED GLUCOSE TOLERANCE): ICD-10-CM

## 2024-03-29 LAB
ALBUMIN SERPL-MCNC: 3.6 G/DL (ref 3.5–5)
ALBUMIN/GLOB SERPL: 1 (ref 1.1–2.2)
ALP SERPL-CCNC: 70 U/L (ref 45–117)
ALT SERPL-CCNC: 24 U/L (ref 12–78)
ANION GAP SERPL CALC-SCNC: 5 MMOL/L (ref 5–15)
AST SERPL-CCNC: 18 U/L (ref 15–37)
BILIRUB SERPL-MCNC: 0.3 MG/DL (ref 0.2–1)
BUN SERPL-MCNC: 10 MG/DL (ref 6–20)
BUN/CREAT SERPL: 12 (ref 12–20)
CALCIUM SERPL-MCNC: 9.5 MG/DL (ref 8.5–10.1)
CHLORIDE SERPL-SCNC: 106 MMOL/L (ref 97–108)
CHOLEST SERPL-MCNC: 220 MG/DL
CO2 SERPL-SCNC: 29 MMOL/L (ref 21–32)
CREAT SERPL-MCNC: 0.81 MG/DL (ref 0.55–1.02)
ERYTHROCYTE [DISTWIDTH] IN BLOOD BY AUTOMATED COUNT: 12.7 % (ref 11.5–14.5)
EST. AVERAGE GLUCOSE BLD GHB EST-MCNC: 108 MG/DL
GLOBULIN SER CALC-MCNC: 3.5 G/DL (ref 2–4)
GLUCOSE SERPL-MCNC: 100 MG/DL (ref 65–100)
HBA1C MFR BLD: 5.4 % (ref 4–5.6)
HCT VFR BLD AUTO: 46.4 % (ref 35–47)
HDLC SERPL-MCNC: 54 MG/DL
HDLC SERPL: 4.1 (ref 0–5)
HGB BLD-MCNC: 14.6 G/DL (ref 11.5–16)
LDLC SERPL CALC-MCNC: 138.4 MG/DL (ref 0–100)
MCH RBC QN AUTO: 28.9 PG (ref 26–34)
MCHC RBC AUTO-ENTMCNC: 31.5 G/DL (ref 30–36.5)
MCV RBC AUTO: 91.9 FL (ref 80–99)
NRBC # BLD: 0 K/UL (ref 0–0.01)
NRBC BLD-RTO: 0 PER 100 WBC
PLATELET # BLD AUTO: 361 K/UL (ref 150–400)
PMV BLD AUTO: 11.2 FL (ref 8.9–12.9)
POTASSIUM SERPL-SCNC: 4.2 MMOL/L (ref 3.5–5.1)
PROT SERPL-MCNC: 7.1 G/DL (ref 6.4–8.2)
RBC # BLD AUTO: 5.05 M/UL (ref 3.8–5.2)
SODIUM SERPL-SCNC: 140 MMOL/L (ref 136–145)
TRIGL SERPL-MCNC: 138 MG/DL
TSH SERPL DL<=0.05 MIU/L-ACNC: 1.61 UIU/ML (ref 0.36–3.74)
VLDLC SERPL CALC-MCNC: 27.6 MG/DL
WBC # BLD AUTO: 10.6 K/UL (ref 3.6–11)

## 2024-04-15 DIAGNOSIS — G43.019 INTRACTABLE MIGRAINE WITHOUT AURA AND WITHOUT STATUS MIGRAINOSUS: ICD-10-CM

## 2024-04-15 RX ORDER — RIMEGEPANT SULFATE 75 MG/75MG
TABLET, ORALLY DISINTEGRATING ORAL
Qty: 16 TABLET | Refills: 1 | Status: SHIPPED | OUTPATIENT
Start: 2024-04-15

## 2024-04-15 NOTE — TELEPHONE ENCOUNTER
Requested Prescriptions     Pending Prescriptions Disp Refills    rimegepant sulfate (NURTEC) 75 MG TBDP [Pharmacy Med Name: NURTEC ODT 75 MG TABLET] 16 tablet 0     Sig: TAKE 1 TABLET BY MOUTH EVERY OTHER DAY        Last Visit 3/26/24  Last Refill 3/26/24

## 2024-04-16 DIAGNOSIS — G43.019 INTRACTABLE MIGRAINE WITHOUT AURA AND WITHOUT STATUS MIGRAINOSUS: ICD-10-CM

## 2024-04-16 RX ORDER — RIMEGEPANT SULFATE 75 MG/75MG
TABLET, ORALLY DISINTEGRATING ORAL
Qty: 16 TABLET | Refills: 1 | OUTPATIENT
Start: 2024-04-16

## 2024-07-11 DIAGNOSIS — G43.019 INTRACTABLE MIGRAINE WITHOUT AURA AND WITHOUT STATUS MIGRAINOSUS: ICD-10-CM

## 2024-07-11 RX ORDER — RIMEGEPANT SULFATE 75 MG/75MG
TABLET, ORALLY DISINTEGRATING ORAL
Qty: 16 TABLET | Refills: 1 | Status: SHIPPED | OUTPATIENT
Start: 2024-07-11

## 2024-08-09 ENCOUNTER — TELEPHONE (OUTPATIENT)
Dept: PRIMARY CARE CLINIC | Facility: CLINIC | Age: 39
End: 2024-08-09

## 2024-08-09 ENCOUNTER — OFFICE VISIT (OUTPATIENT)
Dept: PRIMARY CARE CLINIC | Facility: CLINIC | Age: 39
End: 2024-08-09
Payer: COMMERCIAL

## 2024-08-09 VITALS
SYSTOLIC BLOOD PRESSURE: 105 MMHG | TEMPERATURE: 97 F | BODY MASS INDEX: 41.14 KG/M2 | HEART RATE: 86 BPM | DIASTOLIC BLOOD PRESSURE: 73 MMHG | HEIGHT: 66 IN | OXYGEN SATURATION: 99 % | WEIGHT: 256 LBS | RESPIRATION RATE: 18 BRPM

## 2024-08-09 DIAGNOSIS — G43.109 OCULAR MIGRAINE: ICD-10-CM

## 2024-08-09 DIAGNOSIS — F41.9 ANXIETY: ICD-10-CM

## 2024-08-09 DIAGNOSIS — H93.8X1 EAR FULLNESS, RIGHT: ICD-10-CM

## 2024-08-09 DIAGNOSIS — H66.91 ACUTE INFECTION OF RIGHT EAR: Primary | ICD-10-CM

## 2024-08-09 PROCEDURE — 99214 OFFICE O/P EST MOD 30 MIN: CPT | Performed by: INTERNAL MEDICINE

## 2024-08-09 RX ORDER — CEFDINIR 300 MG/1
300 CAPSULE ORAL 2 TIMES DAILY
Qty: 14 CAPSULE | Refills: 0 | Status: SHIPPED | OUTPATIENT
Start: 2024-08-09 | End: 2024-08-16

## 2024-08-09 RX ORDER — HYDROCORTISONE AND ACETIC ACID 20.75; 10.375 MG/ML; MG/ML
3 SOLUTION AURICULAR (OTIC) 2 TIMES DAILY
Qty: 10 ML | Refills: 0 | Status: SHIPPED | OUTPATIENT
Start: 2024-08-09 | End: 2024-08-14

## 2024-08-09 SDOH — ECONOMIC STABILITY: FOOD INSECURITY: WITHIN THE PAST 12 MONTHS, THE FOOD YOU BOUGHT JUST DIDN'T LAST AND YOU DIDN'T HAVE MONEY TO GET MORE.: NEVER TRUE

## 2024-08-09 SDOH — ECONOMIC STABILITY: INCOME INSECURITY: HOW HARD IS IT FOR YOU TO PAY FOR THE VERY BASICS LIKE FOOD, HOUSING, MEDICAL CARE, AND HEATING?: NOT HARD AT ALL

## 2024-08-09 SDOH — ECONOMIC STABILITY: FOOD INSECURITY: WITHIN THE PAST 12 MONTHS, YOU WORRIED THAT YOUR FOOD WOULD RUN OUT BEFORE YOU GOT MONEY TO BUY MORE.: NEVER TRUE

## 2024-08-09 ASSESSMENT — ENCOUNTER SYMPTOMS
COUGH: 0
ABDOMINAL PAIN: 0
RHINORRHEA: 0
BACK PAIN: 0
CHEST TIGHTNESS: 0
SORE THROAT: 0
DIARRHEA: 0
CONSTIPATION: 0
EYE DISCHARGE: 0
COLOR CHANGE: 0
SHORTNESS OF BREATH: 0

## 2024-08-09 ASSESSMENT — PATIENT HEALTH QUESTIONNAIRE - PHQ9
2. FEELING DOWN, DEPRESSED OR HOPELESS: SEVERAL DAYS
SUM OF ALL RESPONSES TO PHQ QUESTIONS 1-9: 1
1. LITTLE INTEREST OR PLEASURE IN DOING THINGS: NOT AT ALL
SUM OF ALL RESPONSES TO PHQ QUESTIONS 1-9: 1
SUM OF ALL RESPONSES TO PHQ QUESTIONS 1-9: 1
SUM OF ALL RESPONSES TO PHQ9 QUESTIONS 1 & 2: 1
SUM OF ALL RESPONSES TO PHQ QUESTIONS 1-9: 1

## 2024-08-09 NOTE — PROGRESS NOTES
\"Have you been to the ER, urgent care clinic since your last visit?  Hospitalized since your last visit?\"    NO      “Have you seen or consulted any other health care providers outside of [facility] since your last visit?”    NO      Chief Complaint   Patient presents with    Otalgia     Right ear. Pain on and off for a month. Fluid is dripping out, stated pt. Can hear a \"whooshing\" sound.        Pt is ok with scribe.

## 2024-08-09 NOTE — PROGRESS NOTES
Adenike Rios (:  1985) is a 38 y.o. female, Established patient, here for evaluation of the following chief complaint(s):  Otalgia (Right ear. Pain on and off for a month. Fluid is dripping out, stated pt. Can hear a \"whooshing\" sound. )        Assessment & Plan   ASSESSMENT/PLAN:  1. Acute infection of right ear  -     cefdinir (OMNICEF) 300 MG capsule; Take 1 capsule by mouth 2 times daily for 7 days, Disp-14 capsule, R-0Normal sent to pharmacy.  I prescribed the pt Omincef 300 mg Potential side effects were discussed.    2. Anxiety  Well controlled on current dose of Wellbutrin.    3. Ocular migraine  Takes imitrex as needed for breakthrough Migraine attacks.    4. Ear fullness, right  -     acetic acid-hydrocortisone (VOSOL-HC) 1-2 % otic solution; Place 3 drops into both ears 2 times daily for 5 days, Disp-10 mL, R-0Normal sent to pharmacy.  I also prescribed the pt VOSOL-Hc Potential side effects were discussed.             Subjective   SUBJECTIVE/OBJECTIVE:  HPI    Pt presents today with R ear pain.    Pt explains that her R. Ear has been giving her pain for the last month on and off. She noticed pressure  from her ear and can hear a whooshing sound. She thinks it may be an ear infection she states its swollen,  pressure and pulsing sensation.    Pt is compliant in taking Wellbutrin she wants to stay at th sachin dosage.  Pt is still compliant in taking Nurtec every other day, she takes Imitrex prn.    Patient Active Problem List   Diagnosis    Allergic asthma    Anxiety disorder    Obesity, morbid (HCC)    Thyroid nodule    Ocular migraine        Current Outpatient Medications on File Prior to Visit   Medication Sig Dispense Refill    rimegepant sulfate (NURTEC) 75 MG TBDP TAKE 1 TABLET BY MOUTH EVERY OTHER DAY 16 tablet 1    buPROPion (WELLBUTRIN SR) 100 MG extended release tablet Take 1 tablet by mouth 2 times daily 60 tablet 3    Multiple Vitamins-Minerals (MULTIVITAL PO) Take by mouth

## 2024-08-09 NOTE — TELEPHONE ENCOUNTER
Appointment Request From: Adenike Rios     With Provider: Vidya Samano MD [[facility]]     Preferred Date Range: 8/9/2024 - 8/12/2024     Preferred Times: Any Time     Reason for visit: Request an Appointment     Comments:  I believe I have an ear infection.

## 2024-09-02 DIAGNOSIS — H92.09 CHRONIC EAR PAIN, UNSPECIFIED LATERALITY: Primary | ICD-10-CM

## 2024-09-02 DIAGNOSIS — G89.29 CHRONIC EAR PAIN, UNSPECIFIED LATERALITY: Primary | ICD-10-CM

## 2024-09-25 DIAGNOSIS — G43.019 INTRACTABLE MIGRAINE WITHOUT AURA AND WITHOUT STATUS MIGRAINOSUS: ICD-10-CM

## 2024-09-25 DIAGNOSIS — F41.9 ANXIETY AND DEPRESSION: ICD-10-CM

## 2024-09-25 DIAGNOSIS — F32.A ANXIETY AND DEPRESSION: ICD-10-CM

## 2024-09-25 RX ORDER — RIMEGEPANT SULFATE 75 MG/75MG
TABLET, ORALLY DISINTEGRATING ORAL
Qty: 16 TABLET | Refills: 1 | Status: SHIPPED | OUTPATIENT
Start: 2024-09-25

## 2024-09-25 RX ORDER — BUPROPION HYDROCHLORIDE 100 MG/1
100 TABLET, EXTENDED RELEASE ORAL 2 TIMES DAILY
Qty: 180 TABLET | Refills: 0 | Status: SHIPPED | OUTPATIENT
Start: 2024-09-25

## 2024-09-25 RX ORDER — RIMEGEPANT SULFATE 75 MG/75MG
TABLET, ORALLY DISINTEGRATING ORAL
Qty: 16 TABLET | Refills: 1 | OUTPATIENT
Start: 2024-09-25

## 2024-09-25 RX ORDER — BUPROPION HYDROCHLORIDE 100 MG/1
100 TABLET, EXTENDED RELEASE ORAL 2 TIMES DAILY
Qty: 60 TABLET | Refills: 3 | OUTPATIENT
Start: 2024-09-25

## 2024-11-13 DIAGNOSIS — G43.019 INTRACTABLE MIGRAINE WITHOUT AURA AND WITHOUT STATUS MIGRAINOSUS: ICD-10-CM

## 2024-11-13 DIAGNOSIS — F32.A ANXIETY AND DEPRESSION: ICD-10-CM

## 2024-11-13 DIAGNOSIS — F41.9 ANXIETY AND DEPRESSION: ICD-10-CM

## 2024-11-13 RX ORDER — BUPROPION HYDROCHLORIDE 100 MG/1
100 TABLET, EXTENDED RELEASE ORAL 2 TIMES DAILY
Qty: 180 TABLET | Refills: 0 | Status: SHIPPED | OUTPATIENT
Start: 2024-11-13

## 2024-11-13 RX ORDER — RIMEGEPANT SULFATE 75 MG/75MG
TABLET, ORALLY DISINTEGRATING ORAL
Qty: 16 TABLET | Refills: 1 | Status: SHIPPED | OUTPATIENT
Start: 2024-11-13

## 2024-12-12 DIAGNOSIS — G43.019 INTRACTABLE MIGRAINE WITHOUT AURA AND WITHOUT STATUS MIGRAINOSUS: ICD-10-CM

## 2024-12-16 RX ORDER — RIMEGEPANT SULFATE 75 MG/75MG
TABLET, ORALLY DISINTEGRATING ORAL
Qty: 16 TABLET | Refills: 1 | Status: SHIPPED | OUTPATIENT
Start: 2024-12-16

## 2025-01-10 DIAGNOSIS — G43.019 INTRACTABLE MIGRAINE WITHOUT AURA AND WITHOUT STATUS MIGRAINOSUS: ICD-10-CM

## 2025-01-10 RX ORDER — RIMEGEPANT SULFATE 75 MG/75MG
TABLET, ORALLY DISINTEGRATING ORAL
Qty: 16 TABLET | Refills: 1 | OUTPATIENT
Start: 2025-01-10

## 2025-02-20 DIAGNOSIS — J34.89 SINUS PRESSURE: ICD-10-CM

## 2025-02-20 DIAGNOSIS — R09.81 NASAL CONGESTION: ICD-10-CM

## 2025-02-20 DIAGNOSIS — F32.A ANXIETY AND DEPRESSION: ICD-10-CM

## 2025-02-20 DIAGNOSIS — F41.9 ANXIETY AND DEPRESSION: ICD-10-CM

## 2025-02-20 DIAGNOSIS — G43.019 INTRACTABLE MIGRAINE WITHOUT AURA AND WITHOUT STATUS MIGRAINOSUS: ICD-10-CM

## 2025-02-21 ENCOUNTER — PATIENT MESSAGE (OUTPATIENT)
Dept: PRIMARY CARE CLINIC | Facility: CLINIC | Age: 40
End: 2025-02-21

## 2025-02-21 DIAGNOSIS — G43.109 OCULAR MIGRAINE: ICD-10-CM

## 2025-02-21 RX ORDER — SUMATRIPTAN 50 MG/1
50 TABLET, FILM COATED ORAL AS NEEDED
Qty: 9 TABLET | Refills: 1 | Status: SHIPPED | OUTPATIENT
Start: 2025-02-21

## 2025-02-21 RX ORDER — AZELASTINE HYDROCHLORIDE 137 UG/1
1 SPRAY, METERED NASAL 2 TIMES DAILY
Qty: 1 EACH | Refills: 1 | Status: SHIPPED | OUTPATIENT
Start: 2025-02-21

## 2025-02-21 RX ORDER — BUPROPION HYDROCHLORIDE 100 MG/1
100 TABLET, EXTENDED RELEASE ORAL 2 TIMES DAILY
Qty: 180 TABLET | Refills: 0 | Status: SHIPPED | OUTPATIENT
Start: 2025-02-21

## 2025-02-21 RX ORDER — RIMEGEPANT SULFATE 75 MG/75MG
TABLET, ORALLY DISINTEGRATING ORAL
Qty: 16 TABLET | Refills: 1 | Status: SHIPPED | OUTPATIENT
Start: 2025-02-21

## 2025-02-26 SDOH — ECONOMIC STABILITY: TRANSPORTATION INSECURITY
IN THE PAST 12 MONTHS, HAS THE LACK OF TRANSPORTATION KEPT YOU FROM MEDICAL APPOINTMENTS OR FROM GETTING MEDICATIONS?: NO

## 2025-02-26 SDOH — ECONOMIC STABILITY: INCOME INSECURITY: IN THE LAST 12 MONTHS, WAS THERE A TIME WHEN YOU WERE NOT ABLE TO PAY THE MORTGAGE OR RENT ON TIME?: NO

## 2025-02-26 SDOH — ECONOMIC STABILITY: FOOD INSECURITY: WITHIN THE PAST 12 MONTHS, YOU WORRIED THAT YOUR FOOD WOULD RUN OUT BEFORE YOU GOT MONEY TO BUY MORE.: NEVER TRUE

## 2025-02-26 SDOH — ECONOMIC STABILITY: FOOD INSECURITY: WITHIN THE PAST 12 MONTHS, THE FOOD YOU BOUGHT JUST DIDN'T LAST AND YOU DIDN'T HAVE MONEY TO GET MORE.: NEVER TRUE

## 2025-02-28 ENCOUNTER — TELEMEDICINE (OUTPATIENT)
Dept: PRIMARY CARE CLINIC | Facility: CLINIC | Age: 40
End: 2025-02-28
Payer: COMMERCIAL

## 2025-02-28 DIAGNOSIS — H54.7 VISION PROBLEM: ICD-10-CM

## 2025-02-28 DIAGNOSIS — G43.019 INTRACTABLE MIGRAINE WITHOUT AURA AND WITHOUT STATUS MIGRAINOSUS: Primary | ICD-10-CM

## 2025-02-28 PROCEDURE — 99214 OFFICE O/P EST MOD 30 MIN: CPT | Performed by: INTERNAL MEDICINE

## 2025-02-28 RX ORDER — SUMATRIPTAN 50 MG/1
50 TABLET, FILM COATED ORAL
Qty: 9 TABLET | Refills: 1 | Status: SHIPPED | OUTPATIENT
Start: 2025-02-28 | End: 2025-02-28

## 2025-02-28 RX ORDER — AMITRIPTYLINE HYDROCHLORIDE 10 MG/1
10 TABLET ORAL NIGHTLY
Qty: 90 TABLET | Refills: 0 | Status: SHIPPED | OUTPATIENT
Start: 2025-02-28

## 2025-02-28 ASSESSMENT — ENCOUNTER SYMPTOMS
SHORTNESS OF BREATH: 0
RHINORRHEA: 0
CHEST TIGHTNESS: 0
COLOR CHANGE: 0
CONSTIPATION: 0
BACK PAIN: 0
SORE THROAT: 0
DIARRHEA: 0
COUGH: 0
EYE DISCHARGE: 0
ABDOMINAL PAIN: 0

## 2025-02-28 NOTE — PROGRESS NOTES
Indicates a negative item  -- DELETE ALL ITEMS NOT EXAMINED]    Constitutional: [x] Appears well-developed and well-nourished [x] No apparent distress      [] Abnormal -     Mental status: [x] Alert and awake  [x] Oriented to person/place/time [x] Able to follow commands    [] Abnormal -     Eyes:   EOM    [x]  Normal    [] Abnormal -   Sclera  [x]  Normal    [] Abnormal -          Discharge [x]  None visible   [] Abnormal -     HENT: [x] Normocephalic, atraumatic  [] Abnormal -   [x] Mouth/Throat: Mucous membranes are moist    External Ears [x] Normal  [] Abnormal -    Neck: [x] No visualized mass [] Abnormal -     Pulmonary/Chest: [x] Respiratory effort normal   [x] No visualized signs of difficulty breathing or respiratory distress        [] Abnormal -      Musculoskeletal:   [x] Normal gait with no signs of ataxia         [x] Normal range of motion of neck        [] Abnormal -     Neurological:        [x] No Facial Asymmetry (Cranial nerve 7 motor function) (limited exam due to video visit)          [x] No gaze palsy        [] Abnormal -          Skin:        [x] No significant exanthematous lesions or discoloration noted on facial skin         [] Abnormal -            Psychiatric:       [x] Normal Affect [] Abnormal -        [x] No Hallucinations    Other pertinent observable physical exam findings:-         Adenike Rios, was evaluated through a synchronous (real-time) audio-video encounter. The patient (or guardian if applicable) is aware that this is a billable service, which includes applicable co-pays. This Virtual Visit was conducted with patient's (and/or legal guardian's) consent. Patient identification was verified, and a caregiver was present when appropriate.   The patient was located at Home: [address] [place] [address] [address]  [facility] [id number]  Provider was located at Facility (Appt Dept): [id number] [address] [address]  Confirm you are appropriately licensed, registered, or

## (undated) DEVICE — CONTAINER SPEC 20 ML LID NEUT BUFF FORMALIN 10 % POLYPR STS

## (undated) DEVICE — Z DISCONTINUED NO SUB IDED SET EXTN W/ 4 W STPCOCK M SPIN LOK 36IN

## (undated) DEVICE — AIRLIFE™ U/CONNECT-IT OXYGEN TUBING 7 FEET (2.1 M) CRUSH-RESISTANT OXYGEN TUBING, VINYL TIPPED: Brand: AIRLIFE™

## (undated) DEVICE — SYRINGE MED 20ML STD CLR PLAS LUERLOCK TIP N CTRL DISP

## (undated) DEVICE — KENDALL RADIOLUCENT FOAM MONITORING ELECTRODE -RECTANGULAR SHAPE: Brand: KENDALL

## (undated) DEVICE — CANN NASAL O2 CAPNOGRAPHY AD -- FILTERLINE

## (undated) DEVICE — CATH IV AUTOGRD BC BLU 22GA 25 -- INSYTE

## (undated) DEVICE — Device: Brand: MEDICAL ACTION INDUSTRIES

## (undated) DEVICE — BW-412T DISP COMBO CLEANING BRUSH: Brand: SINGLE USE COMBINATION CLEANING BRUSH

## (undated) DEVICE — SOLIDIFIER FLUID 3000 CC ABSORB

## (undated) DEVICE — FORCEPS BX L240CM JAW DIA2.8MM L CAP W/ NDL MIC MESH TOOTH

## (undated) DEVICE — 1200 GUARD II KIT W/5MM TUBE W/O VAC TUBE: Brand: GUARDIAN

## (undated) DEVICE — CONNECTOR TBNG AUX H2O JET DISP FOR OLY 160/180 SER

## (undated) DEVICE — BAG BELONG PT PERS CLEAR HANDL

## (undated) DEVICE — BAG SPEC BIOHZD LF 2MIL 6X10IN -- CONVERT TO ITEM 357326

## (undated) DEVICE — Z DISCONTINUED USE 2751540 TUBING IRRIG L10IN DISP PMP ENDOGATOR

## (undated) DEVICE — ENDO CARRY-ON PROCEDURE KIT INCLUDES ENZYMATIC SPONGE, GAUZE, BIOHAZARD LABEL, TRAY, LUBRICANT, DIRTY SCOPE LABEL, WATER LABEL, TRAY, DRAWSTRING PAD, AND DEFENDO 4-PIECE KIT.: Brand: ENDO CARRY-ON PROCEDURE KIT

## (undated) DEVICE — QUILTED PREMIUM COMFORT UNDERPAD,EXTRA HEAVY: Brand: WINGS

## (undated) DEVICE — NEEDLE HYPO 18GA L1.5IN PNK S STL HUB POLYPR SHLD REG BVL

## (undated) DEVICE — SET ADMIN 16ML TBNG L100IN 2 Y INJ SITE IV PIGGY BK DISP